# Patient Record
Sex: FEMALE | Race: WHITE | NOT HISPANIC OR LATINO | Employment: OTHER | URBAN - METROPOLITAN AREA
[De-identification: names, ages, dates, MRNs, and addresses within clinical notes are randomized per-mention and may not be internally consistent; named-entity substitution may affect disease eponyms.]

---

## 2017-01-18 ENCOUNTER — OFFICE VISIT (OUTPATIENT)
Dept: FAMILY MEDICINE | Facility: CLINIC | Age: 57
End: 2017-01-18
Payer: COMMERCIAL

## 2017-01-18 VITALS
TEMPERATURE: 97.8 F | SYSTOLIC BLOOD PRESSURE: 136 MMHG | WEIGHT: 188 LBS | HEIGHT: 66 IN | BODY MASS INDEX: 30.22 KG/M2 | DIASTOLIC BLOOD PRESSURE: 80 MMHG | HEART RATE: 62 BPM

## 2017-01-18 DIAGNOSIS — Z23 NEED FOR PROPHYLACTIC VACCINATION AND INOCULATION AGAINST INFLUENZA: ICD-10-CM

## 2017-01-18 DIAGNOSIS — E03.9 ACQUIRED HYPOTHYROIDISM: ICD-10-CM

## 2017-01-18 DIAGNOSIS — Z00.00 ENCOUNTER FOR ROUTINE ADULT HEALTH EXAMINATION WITHOUT ABNORMAL FINDINGS: Primary | ICD-10-CM

## 2017-01-18 DIAGNOSIS — E66.9 NON MORBID OBESITY, UNSPECIFIED OBESITY TYPE: ICD-10-CM

## 2017-01-18 DIAGNOSIS — D36.9 TUBULAR ADENOMA: ICD-10-CM

## 2017-01-18 LAB
ALBUMIN SERPL-MCNC: 4.1 G/DL (ref 3.4–5)
ALP SERPL-CCNC: 108 U/L (ref 40–150)
ALT SERPL W P-5'-P-CCNC: 50 U/L (ref 0–50)
ANION GAP SERPL CALCULATED.3IONS-SCNC: 6 MMOL/L (ref 3–14)
AST SERPL W P-5'-P-CCNC: 25 U/L (ref 0–45)
BILIRUB SERPL-MCNC: 0.6 MG/DL (ref 0.2–1.3)
BUN SERPL-MCNC: 17 MG/DL (ref 7–30)
CALCIUM SERPL-MCNC: 9.6 MG/DL (ref 8.5–10.1)
CHLORIDE SERPL-SCNC: 104 MMOL/L (ref 94–109)
CHOLEST SERPL-MCNC: 216 MG/DL
CO2 SERPL-SCNC: 30 MMOL/L (ref 20–32)
CREAT SERPL-MCNC: 0.97 MG/DL (ref 0.52–1.04)
GFR SERPL CREATININE-BSD FRML MDRD: 59 ML/MIN/1.7M2
GLUCOSE SERPL-MCNC: 93 MG/DL (ref 70–99)
HDLC SERPL-MCNC: 55 MG/DL
LDLC SERPL CALC-MCNC: 145 MG/DL
NONHDLC SERPL-MCNC: 161 MG/DL
POTASSIUM SERPL-SCNC: 4.6 MMOL/L (ref 3.4–5.3)
PROT SERPL-MCNC: 8.1 G/DL (ref 6.8–8.8)
SODIUM SERPL-SCNC: 140 MMOL/L (ref 133–144)
TRIGL SERPL-MCNC: 80 MG/DL
TSH SERPL DL<=0.005 MIU/L-ACNC: 1.67 MU/L (ref 0.4–4)

## 2017-01-18 PROCEDURE — 84443 ASSAY THYROID STIM HORMONE: CPT | Performed by: FAMILY MEDICINE

## 2017-01-18 PROCEDURE — 99396 PREV VISIT EST AGE 40-64: CPT | Performed by: FAMILY MEDICINE

## 2017-01-18 PROCEDURE — 80061 LIPID PANEL: CPT | Performed by: FAMILY MEDICINE

## 2017-01-18 PROCEDURE — 36415 COLL VENOUS BLD VENIPUNCTURE: CPT | Performed by: FAMILY MEDICINE

## 2017-01-18 PROCEDURE — 80053 COMPREHEN METABOLIC PANEL: CPT | Performed by: FAMILY MEDICINE

## 2017-01-18 RX ORDER — LEVOTHYROXINE SODIUM 75 UG/1
75 CAPSULE ORAL DAILY
Qty: 90 CAPSULE | Refills: 3 | Status: CANCELLED | OUTPATIENT
Start: 2017-01-18

## 2017-01-18 NOTE — NURSING NOTE
"Chief Complaint   Patient presents with     Physical     work insurance form       Initial /80 mmHg  Pulse 62  Temp(Src) 97.8  F (36.6  C) (Oral)  Ht 5' 6\" (1.676 m)  Wt 188 lb (85.276 kg)  BMI 30.36 kg/m2  LMP 09/14/2013  Breastfeeding? No Estimated body mass index is 30.36 kg/(m^2) as calculated from the following:    Height as of this encounter: 5' 6\" (1.676 m).    Weight as of this encounter: 188 lb (85.276 kg).  BP completed using cuff size: large    Health maintenance- tsh, souleymane Barnett CMA            "

## 2017-01-18 NOTE — MR AVS SNAPSHOT
After Visit Summary   1/18/2017    Selena Saenz    MRN: [id number]           Patient Information     Date Of Birth          1960        Visit Information        Provider Department      1/18/2017 7:30 AM Rachel Díaz MD [facility]        Today's Diagnoses     Encounter for routine adult health examination without abnormal findings    -  1     Need for prophylactic vaccination and inoculation against influenza         Acquired hypothyroidism         Tubular adenoma         Non morbid obesity, unspecified obesity type            Follow-ups after your visit        Future tests that were ordered for you today     Open Future Orders        Priority Expected Expires Ordered    *MA Screening Digital Bilateral Routine  1/18/2018 1/18/2017            Who to contact     If you have questions or need follow up information about today's clinic visit or your schedule please contact [facility] directly at [phone].  Normal or non-critical lab and imaging results will be communicated to you by MyChart, letter or phone within 4 business days after the clinic has received the results. If you do not hear from us within 7 days, please contact the clinic through [email or web address] or phone. If you have a critical or abnormal lab result, we will notify you by phone as soon as possible.  Submit refill requests through [employer] or call your pharmacy and they will forward the refill request to us. Please allow 3 business days for your refill to be completed.          Additional Information About Your Visit        [email or web address] Information     [employer] gives you secure access to your electronic health record. If you see a primary care provider, you can also send messages to your care team and make appointments. If you have questions, please call your primary care clinic.  If you do not have a primary care provider, please call [phone] and they will assist you.        Care EveryWhere ID   "   This is your Care EveryWhere ID. This could be used by other organizations to access your [facility] medical records  [id number]        Your Vitals Were     Pulse Temperature Height BMI (Body Mass Index) Last Period Breastfeeding?    62 97.8  F (36.6  C) (Oral) 5' 6\" (1.676 m) 30.36 kg/m2 09/14/2013 No       Blood Pressure from Last 3 Encounters:   01/18/17 136/80   02/08/16 118/78   03/27/15 122/70    Weight from Last 3 Encounters:   01/18/17 188 lb (85.276 kg)   02/08/16 178 lb 9.6 oz (81.012 kg)   03/27/15 164 lb 1.6 oz (74.435 kg)              We Performed the Following     COMPREHENSIVE METABOLIC PANEL     Lipid Profile with reflex to direct LDL     TSH with free T4 reflex        Primary Care Provider Office Phone # Fax #    Ramona Ann Aaseby-Aguilera, PA-C [phone]       [facility] [address] [address] [facility] [address]        Thank you!     Thank you for choosing [facility]  for your care. Our goal is always to provide you with excellent care. Hearing back from our patients is one way we can continue to improve our services. Please take a few minutes to complete the written survey that you may receive in the mail after your visit with us. Thank you!             Your Updated Medication List - Protect others around you: Learn how to safely use, store and throw away your medicines at www.disposemymeds.org.          This list is accurate as of: 1/18/17  8:04 AM.  Always use your most recent med list.                   Brand Name Dispense Instructions for use    calcium 500 + D 500-400 MG-UNIT Tabs   Generic drug:  Calcium Carb-Cholecalciferol      Take  by mouth daily.       DAILY MULTIVITAMIN PO      Take  by mouth daily.       Levothyroxine Sodium 75 MCG Caps     90 capsule    Take 75 mcg by mouth daily         "

## 2017-01-19 ENCOUNTER — TELEPHONE (OUTPATIENT)
Dept: FAMILY MEDICINE | Facility: CLINIC | Age: 57
End: 2017-01-19

## 2017-01-19 NOTE — TELEPHONE ENCOUNTER
Form.... [facility] physician screening  faxed, copied and sent original to patient.  Viky Johnson CMA

## 2017-01-20 PROBLEM — N18.30 CKD (CHRONIC KIDNEY DISEASE) STAGE 3, GFR 30-59 ML/MIN (H): Status: ACTIVE | Noted: 2017-01-20

## 2017-04-03 DIAGNOSIS — E03.9 HYPOTHYROIDISM: ICD-10-CM

## 2017-04-03 RX ORDER — LEVOTHYROXINE SODIUM 75 UG/1
TABLET ORAL
Qty: 90 TABLET | Refills: 2 | Status: SHIPPED | OUTPATIENT
Start: 2017-04-03 | End: 2017-12-13

## 2017-04-03 NOTE — TELEPHONE ENCOUNTER
Pending Prescriptions:                       Disp   Refills    levothyroxine (SYNTHROID/LEVOTHROID) 75 M*90 tab*3            Sig: TAKE 75 MCG BY MOUTH DAILY         Last Written Prescription Date: 04/05/2016  Last Quantity: 90, # refills: 3  Last Office Visit with [facility], [facility] or [facility] prescribing provider: 01/18/2017        TSH   Date Value Ref Range Status   01/18/2017 1.67 0.40 - 4.00 mU/L Final     Jefferson BOYERT

## 2017-04-03 NOTE — TELEPHONE ENCOUNTER
Prescription approved per [facility] [place] [place] Refill Protocol.  Sixto Shields RN, BSN

## 2017-07-22 ENCOUNTER — HEALTH MAINTENANCE LETTER (OUTPATIENT)
Age: 57
End: 2017-07-22

## 2017-12-13 DIAGNOSIS — E03.9 HYPOTHYROIDISM: ICD-10-CM

## 2017-12-13 RX ORDER — LEVOTHYROXINE SODIUM 75 UG/1
TABLET ORAL
Qty: 90 TABLET | Refills: 0 | Status: SHIPPED | OUTPATIENT
Start: 2017-12-13 | End: 2018-03-20

## 2017-12-13 NOTE — TELEPHONE ENCOUNTER
Prescription approved per [facility] Refill Protocol.  LM pt to schedule yearly for Eddie Levy RN

## 2018-02-07 ENCOUNTER — TRANSFERRED RECORDS (OUTPATIENT)
Dept: HEALTH INFORMATION MANAGEMENT | Facility: CLINIC | Age: 58
End: 2018-02-07

## 2018-03-20 ENCOUNTER — OFFICE VISIT (OUTPATIENT)
Dept: FAMILY MEDICINE | Facility: CLINIC | Age: 58
End: 2018-03-20
Payer: COMMERCIAL

## 2018-03-20 VITALS
BODY MASS INDEX: 29.94 KG/M2 | WEIGHT: 186.3 LBS | HEIGHT: 66 IN | SYSTOLIC BLOOD PRESSURE: 138 MMHG | TEMPERATURE: 97.9 F | OXYGEN SATURATION: 97 % | DIASTOLIC BLOOD PRESSURE: 83 MMHG | HEART RATE: 68 BPM

## 2018-03-20 DIAGNOSIS — E03.9 ACQUIRED HYPOTHYROIDISM: ICD-10-CM

## 2018-03-20 DIAGNOSIS — N18.30 CKD (CHRONIC KIDNEY DISEASE) STAGE 3, GFR 30-59 ML/MIN (H): ICD-10-CM

## 2018-03-20 DIAGNOSIS — E03.9 HYPOTHYROIDISM, UNSPECIFIED TYPE: ICD-10-CM

## 2018-03-20 DIAGNOSIS — Z00.00 ROUTINE GENERAL MEDICAL EXAMINATION AT A HEALTH CARE FACILITY: Primary | ICD-10-CM

## 2018-03-20 DIAGNOSIS — E78.5 HYPERLIPIDEMIA LDL GOAL <130: ICD-10-CM

## 2018-03-20 DIAGNOSIS — N95.1 SYMPTOMATIC MENOPAUSAL OR FEMALE CLIMACTERIC STATES: ICD-10-CM

## 2018-03-20 LAB
ALBUMIN SERPL-MCNC: 3.7 G/DL (ref 3.4–5)
ALP SERPL-CCNC: 90 U/L (ref 40–150)
ALT SERPL W P-5'-P-CCNC: 26 U/L (ref 0–50)
ANION GAP SERPL CALCULATED.3IONS-SCNC: 6 MMOL/L (ref 3–14)
AST SERPL W P-5'-P-CCNC: 21 U/L (ref 0–45)
BILIRUB SERPL-MCNC: 0.5 MG/DL (ref 0.2–1.3)
BUN SERPL-MCNC: 14 MG/DL (ref 7–30)
CALCIUM SERPL-MCNC: 8.7 MG/DL (ref 8.5–10.1)
CHLORIDE SERPL-SCNC: 108 MMOL/L (ref 94–109)
CHOLEST SERPL-MCNC: 230 MG/DL
CO2 SERPL-SCNC: 26 MMOL/L (ref 20–32)
CREAT SERPL-MCNC: 0.86 MG/DL (ref 0.52–1.04)
ERYTHROCYTE [DISTWIDTH] IN BLOOD BY AUTOMATED COUNT: 13.7 % (ref 10–15)
GFR SERPL CREATININE-BSD FRML MDRD: 67 ML/MIN/1.7M2
GLUCOSE SERPL-MCNC: 92 MG/DL (ref 70–99)
HCT VFR BLD AUTO: 41.5 % (ref 35–47)
HDLC SERPL-MCNC: 53 MG/DL
HGB BLD-MCNC: 13.5 G/DL (ref 11.7–15.7)
LDLC SERPL CALC-MCNC: 157 MG/DL
MCH RBC QN AUTO: 28 PG (ref 26.5–33)
MCHC RBC AUTO-ENTMCNC: 32.5 G/DL (ref 31.5–36.5)
MCV RBC AUTO: 86 FL (ref 78–100)
NONHDLC SERPL-MCNC: 177 MG/DL
PLATELET # BLD AUTO: 247 10E9/L (ref 150–450)
POTASSIUM SERPL-SCNC: 4.1 MMOL/L (ref 3.4–5.3)
PROT SERPL-MCNC: 7.5 G/DL (ref 6.8–8.8)
RBC # BLD AUTO: 4.82 10E12/L (ref 3.8–5.2)
SODIUM SERPL-SCNC: 140 MMOL/L (ref 133–144)
TRIGL SERPL-MCNC: 99 MG/DL
TSH SERPL DL<=0.005 MIU/L-ACNC: 1.65 MU/L (ref 0.4–4)
WBC # BLD AUTO: 6 10E9/L (ref 4–11)

## 2018-03-20 PROCEDURE — 80061 LIPID PANEL: CPT | Performed by: PHYSICIAN ASSISTANT

## 2018-03-20 PROCEDURE — 84443 ASSAY THYROID STIM HORMONE: CPT | Performed by: PHYSICIAN ASSISTANT

## 2018-03-20 PROCEDURE — 36415 COLL VENOUS BLD VENIPUNCTURE: CPT | Performed by: PHYSICIAN ASSISTANT

## 2018-03-20 PROCEDURE — 99396 PREV VISIT EST AGE 40-64: CPT | Performed by: PHYSICIAN ASSISTANT

## 2018-03-20 PROCEDURE — 80053 COMPREHEN METABOLIC PANEL: CPT | Performed by: PHYSICIAN ASSISTANT

## 2018-03-20 PROCEDURE — 85027 COMPLETE CBC AUTOMATED: CPT | Performed by: PHYSICIAN ASSISTANT

## 2018-03-20 RX ORDER — ATORVASTATIN CALCIUM 10 MG/1
5 TABLET, FILM COATED ORAL DAILY
Qty: 45 TABLET | Refills: 1 | Status: SHIPPED | OUTPATIENT
Start: 2018-03-20 | End: 2018-09-12

## 2018-03-20 RX ORDER — LEVOTHYROXINE SODIUM 75 UG/1
TABLET ORAL
Qty: 90 TABLET | Refills: 3 | Status: SHIPPED | OUTPATIENT
Start: 2018-03-20 | End: 2019-03-14

## 2018-03-20 NOTE — NURSING NOTE
"Chief Complaint   Patient presents with     Physical     fasting       Initial /83 (BP Location: Right arm, Patient Position: Chair, Cuff Size: Adult Large)  Pulse 68  Temp 97.9  F (36.6  C) (Oral)  Ht 5' 6\" (1.676 m)  Wt 186 lb 4.8 oz (84.5 kg)  LMP 09/14/2013  SpO2 97%  BMI 30.07 kg/m2 Estimated body mass index is 30.07 kg/(m^2) as calculated from the following:    Height as of this encounter: 5' 6\" (1.676 m).    Weight as of this encounter: 186 lb 4.8 oz (84.5 kg).  Medication Reconciliation: complete      Health Maintenance addressed:  Mammogram    n/a      "

## 2018-03-20 NOTE — PATIENT INSTRUCTIONS
(Z00.00) Routine general medical examination at a health care facility  (primary encounter diagnosis)  Comment:   Plan:     (E03.9) Acquired hypothyroidism  Comment:   Plan: TSH with free T4 reflex            (N18.3) CKD (chronic kidney disease) stage 3, GFR 30-59 ml/min  Comment: has only had 1 lab come back <60.  If this comes back indicating CKD#3 well recommend adding lisinopril   Plan: CBC with platelets, TSH with free T4 reflex,         Comprehensive metabolic panel, Lipid panel         reflex to direct LDL Fasting            (E78.5) Hyperlipidemia LDL goal <130  Comment: well start on lipitor 5 mg.  If CKD#3 and LDL higher than 100, well increase lipitor to 10 mg.    Plan: Comprehensive metabolic panel, Lipid panel         reflex to direct LDL Fasting, atorvastatin         (LIPITOR) 10 MG tablet            (E03.9) Hypothyroidism, unspecified type  Comment:   Plan: levothyroxine (SYNTHROID/LEVOTHROID) 75 MCG         tablet                      Preventive Health Recommendations  Female Ages 50 - 64    Yearly exam: See your health care provider every year in order to  o Review health changes.   o Discuss preventive care.    o Review your medicines if your doctor has prescribed any.      Get a Pap test every three years (unless you have an abnormal result and your provider advises testing more often).    If you get Pap tests with HPV test, you only need to test every 5 years, unless you have an abnormal result.     You do not need a Pap test if your uterus was removed (hysterectomy) and you have not had cancer.    You should be tested each year for STDs (sexually transmitted diseases) if you're at risk.     Have a mammogram every 1 to 2 years.    Have a colonoscopy at age 50, or have a yearly FIT test (stool test). These exams screen for colon cancer.      Have a cholesterol test every 5 years, or more often if advised.    Have a diabetes test (fasting glucose) every three years. If you are at risk for  diabetes, you should have this test more often.     If you are at risk for osteoporosis (brittle bone disease), think about having a bone density scan (DEXA).    Shots: Get a flu shot each year. Get a tetanus shot every 10 years.    Nutrition:     Eat at least 5 servings of fruits and vegetables each day.    Eat whole-grain bread, whole-wheat pasta and brown rice instead of white grains and rice.    Talk to your provider about Calcium and Vitamin D.     Lifestyle    Exercise at least 150 minutes a week (30 minutes a day, 5 days a week). This will help you control your weight and prevent disease.    Limit alcohol to one drink per day.    No smoking.     Wear sunscreen to prevent skin cancer.     See your dentist every six months for an exam and cleaning.    See your eye doctor every 1 to 2 years.    Chronic Kidney Disease (CKD)     The role of the kidneys is to remove waste products and extra water from the blood.  When the kidneys do not work as they should, waste products begin to build up in the blood. This is called chronic kidney disease (CKD). CKD means that you have kidney damage or a decrease in kidney function lasting at least 3 months. CKD allows extra water, waste, and toxins to build up in the body. This can eventually become life-threatening. You might need dialysis or a kidney transplant to stay alive. This most severe form is called end stage renal disease.  Diabetes is the leading causes of chronic renal failure. Other causes include high blood pressure, hardening of the arteries (atherosclerosis), lupus, inflammation of the blood vessels (vasculitis), and past viral or bacterial infections. Certain over-the-counter pain medicines can cause renal failure when taken often over a long period of time. These include aspirin, ibuprofen, and related anti-inflammatory medicines called NSAIDs (nonsteroidal anti-inflammatory drugs).  Home care  The following guidelines will help you care for yourself at  home:    If you have diabetes, talk with your healthcare provider about keeping your blood sugar under control. Ask if you need to make and changes to your diet, lifestyle, or medicines.    If you have high blood pressure:    Take prescribed medicine to lower your blood pressure to the recommended goal of less than 130/80.    Start a regular exercise program that you enjoy. Check with your healthcare provider to be sure your planned exercise program is right for you.    Eat less salt (sodium). Your healthcare provider can tell you how much salt per day is safe for you.    If you are overweight, talk with your healthcare provider about a weight loss plan.    If you smoke, you must quit. Smoking makes kidney disease worse. Talk with your healthcare provider about ways to help you quit.  For more information, visit the following links:    www.smokefree.gov/sites/default/files/pdf/clearing-the-air-accessible.pdf    www.smokefree.gov    www.cancer.org/healthy/stayawayfromtobacco/guidetoquittingsmoking/    Most people with CKD need to follow a special diet.  Be sure you understand yours. In general, you will need to limit protein, salt, potassium, and phosphorus. You also need to limit how much fluid you drink.     CKD is a risk factor for heart disease. Talk with your healthcare provider about any other risk factors you might have and what you can do to lessen them.    Talk with your healthcare provider about any medicines you are taking to find out if they need to be reduced or stopped.    Don't use the following over-the-counter medicines, or consult your healthcare provider before using:    Aspirin and NSAIDs such as ibuprofen or naproxen. Using acetaminophen for fever or pain is OK.    Laxatives and antacids containing magnesium or aluminum    Fleet or phospho soda enemas containing phosphorus    Certain stomach acid-blocking medicine such as cimetidine or ranitidine     Decongestants containing  pseudoephedrine     Herbal supplements  Follow-up care  Follow up with your healthcare provider, or as advised. Contact one of the following for more information:    American Association of Kidney Patients 949-534-4505 www.aakp.org    National Kidney Foundation 471-995-1934 www.kidney.org    American Kidney Fund 168-106-4304 www.kidneyfund.org    National Kidney Disease Education Program 866-4KIDNEY www.nkdep.nih.gov  If an X-ray, ECG (cardiogram), or other diagnostic test was taken, you will be told of any new findings that may affect your care.  Call 911  Call 911 if you have any of the following:    Severe weakness, dizziness, fainting, drowsiness, or confusion    Chest pain or shortness of breath    Heart beating fast, slow, or irregularly  When to seek medical advice  Call your healthcare provider right away if any of these occur:    Nausea or vomiting    Fever of 100.4 F (38 C) or higher, or as directed by your healthcare provider    Unexpected weight gain or swelling in the legs, ankles, or around the eyes    Decrease or absent urine output  Date Last Reviewed: 9/1/2016 2000-2017 [employer]. [address] [place] [address]. All rights reserved. This information is not intended as a substitute for professional medical care. Always follow your healthcare professional's instructions.        Medicine for Cholesterol Control  Cholesterol is a waxy substance in your bloodstream. If there is too much of it in your blood, it can build up in the walls of your arteries. Over time, this buildup can lead to coronary disease. Coronary disease can put you at risk for a heart attack or stroke. It can also put you at risk for disease of the arteries in your legs and other places in your body. Medicine can give you the extra help you need to control your cholesterol.    How medicine helps  Different kinds of medicines help with cholesterol levels. Some help lower your LDL (bad cholesterol). Some  help raise your HDL (good cholesterol). Other medicines lower your triglyceride levels. And some do all three. It may take some time to find the right medicine for you. Taking medicine will be only one part of your cholesterol control plan. You will still need to eat right and get regular exercise.  Talk with your healthcare provider to find out your risks for having a heart attack. Your provider can tell you what goals to use to see if your treatment is working. These goals may vary based on your health issues or family history. Also ask your provider how often your cholesterol should be checked as part of your treatment plan. You may need to fast before getting your cholesterol checked.  Taking your medicine  It is important to:    Tell your healthcare provider about any other medicines you take. This includes over-the-counter medicines. It also includes vitamins and herbs.    Take your medicine exactly as directed. This helps make sure that it works as it should.    Don't skip a dose.    Don't stop taking it if you feel better.    Don't stop taking it when your cholesterol numbers improve.    Order your refill before your medicine runs out.  Side effects  Medicines can cause side effects. These often occur at the start of taking a new medicine. Side effects can include headache and upset stomach. Rarely you can have muscle aches. Tell your healthcare provider about any side effects you have.  When to call your healthcare provider  When taking your medicine, let your healthcare provider know if you have:    Yellowing of the whites of eyes    Blurred vision    Muscle aches    Trouble breathing   High-risk groups  Some people may need to take medicines called statins to control their cholesterol. This is in addition to eating a healthy diet and getting regular exercise.  Statins can help you stay healthy. They can also help prevent a heart attack or stroke. You may need to take a statin if you are in one of these  groups:    Adults who have had a heart attack or stroke. Or adults who have had peripheral vascular disease, a ministroke (transient ischemic attack), or stable or unstable angina. This group also includes people who have had a procedure to restore blood flow through a blocked artery. These procedures include percutaneous coronary intervention, angioplasty, stent, and open-heart bypass surgery.    Adults who have diabetes. Or adults who are at higher risk of having a heart attack or stroke and have an LDL cholesterol level of 70 to 189 mg/dL    Adults who are 21 years old or older and have an LDL cholesterol level of 190 mg/dL or higher.  If you are in a high-risk group, talk with your healthcare provider about your treatment goals. Make sure you understand why these goals are important, based on your own health history and your family history of heart disease or high cholesterol.  Make a plan to have regular cholesterol checks. You may need to fast before getting this test. Also ask your provider about any side effects your medicines may cause. Let your provider know about any side effects you have. You may need to take more than one medicine to reach the cholesterol goals that you and your provider decide on.  Date Last Reviewed: 10/1/2016    8166-9789 [employer]. [address] [place] [address], [place] [address]. All rights reserved. This information is not intended as a substitute for professional medical care. Always follow your healthcare professional's instructions.

## 2018-03-20 NOTE — MR AVS SNAPSHOT
After Visit Summary   3/20/2018    Selena Saenz    MRN: [id number]           Patient Information     Date Of Birth          1960        Visit Information        Provider Department      3/20/2018 7:00 AM Aaseby-Aguilera, Ramona Ann, PA-C [facility]        Today's Diagnoses     Routine general medical examination at a health care facility    -  1    Acquired hypothyroidism        CKD (chronic kidney disease) stage 3, GFR 30-59 ml/min        Hyperlipidemia LDL goal <130        Hypothyroidism, unspecified type        Symptomatic menopausal or female climacteric states          Care Instructions      (Z00.00) Routine general medical examination at a health care facility  (primary encounter diagnosis)  Comment:   Plan:     (E03.9) Acquired hypothyroidism  Comment:   Plan: TSH with free T4 reflex            (N18.3) CKD (chronic kidney disease) stage 3, GFR 30-59 ml/min  Comment: has only had 1 lab come back <60.  If this comes back indicating CKD#3 well recommend adding lisinopril   Plan: CBC with platelets, TSH with free T4 reflex,         Comprehensive metabolic panel, Lipid panel         reflex to direct LDL Fasting            (E78.5) Hyperlipidemia LDL goal <130  Comment: well start on lipitor 5 mg.  If CKD#3 and LDL higher than 100, well increase lipitor to 10 mg.    Plan: Comprehensive metabolic panel, Lipid panel         reflex to direct LDL Fasting, atorvastatin         (LIPITOR) 10 MG tablet            (E03.9) Hypothyroidism, unspecified type  Comment:   Plan: levothyroxine (SYNTHROID/LEVOTHROID) 75 MCG         tablet                      Preventive Health Recommendations  Female Ages 50 - 64    Yearly exam: See your health care provider every year in order to  o Review health changes.   o Discuss preventive care.    o Review your medicines if your doctor has prescribed any.      Get a Pap test every three years (unless you have an abnormal result and your provider advises  testing more often).    If you get Pap tests with HPV test, you only need to test every 5 years, unless you have an abnormal result.     You do not need a Pap test if your uterus was removed (hysterectomy) and you have not had cancer.    You should be tested each year for STDs (sexually transmitted diseases) if you're at risk.     Have a mammogram every 1 to 2 years.    Have a colonoscopy at age 50, or have a yearly FIT test (stool test). These exams screen for colon cancer.      Have a cholesterol test every 5 years, or more often if advised.    Have a diabetes test (fasting glucose) every three years. If you are at risk for diabetes, you should have this test more often.     If you are at risk for osteoporosis (brittle bone disease), think about having a bone density scan (DEXA).    Shots: Get a flu shot each year. Get a tetanus shot every 10 years.    Nutrition:     Eat at least 5 servings of fruits and vegetables each day.    Eat whole-grain bread, whole-wheat pasta and brown rice instead of white grains and rice.    Talk to your provider about Calcium and Vitamin D.     Lifestyle    Exercise at least 150 minutes a week (30 minutes a day, 5 days a week). This will help you control your weight and prevent disease.    Limit alcohol to one drink per day.    No smoking.     Wear sunscreen to prevent skin cancer.     See your dentist every six months for an exam and cleaning.    See your eye doctor every 1 to 2 years.    Chronic Kidney Disease (CKD)     The role of the kidneys is to remove waste products and extra water from the blood.  When the kidneys do not work as they should, waste products begin to build up in the blood. This is called chronic kidney disease (CKD). CKD means that you have kidney damage or a decrease in kidney function lasting at least 3 months. CKD allows extra water, waste, and toxins to build up in the body. This can eventually become life-threatening. You might need dialysis or a kidney  transplant to stay alive. This most severe form is called end stage renal disease.  Diabetes is the leading causes of chronic renal failure. Other causes include high blood pressure, hardening of the arteries (atherosclerosis), lupus, inflammation of the blood vessels (vasculitis), and past viral or bacterial infections. Certain over-the-counter pain medicines can cause renal failure when taken often over a long period of time. These include aspirin, ibuprofen, and related anti-inflammatory medicines called NSAIDs (nonsteroidal anti-inflammatory drugs).  Home care  The following guidelines will help you care for yourself at home:    If you have diabetes, talk with your healthcare provider about keeping your blood sugar under control. Ask if you need to make and changes to your diet, lifestyle, or medicines.    If you have high blood pressure:    Take prescribed medicine to lower your blood pressure to the recommended goal of less than 130/80.    Start a regular exercise program that you enjoy. Check with your healthcare provider to be sure your planned exercise program is right for you.    Eat less salt (sodium). Your healthcare provider can tell you how much salt per day is safe for you.    If you are overweight, talk with your healthcare provider about a weight loss plan.    If you smoke, you must quit. Smoking makes kidney disease worse. Talk with your healthcare provider about ways to help you quit.  For more information, visit the following links:    www.smokefree.gov/sites/default/files/pdf/clearing-the-air-accessible.pdf    www.smokefree.gov    www.cancer.org/healthy/stayawayfromtobacco/guidetoquittingsmoking/    Most people with CKD need to follow a special diet.  Be sure you understand yours. In general, you will need to limit protein, salt, potassium, and phosphorus. You also need to limit how much fluid you drink.     CKD is a risk factor for heart disease. Talk with your healthcare provider about any  other risk factors you might have and what you can do to lessen them.    Talk with your healthcare provider about any medicines you are taking to find out if they need to be reduced or stopped.    Don't use the following over-the-counter medicines, or consult your healthcare provider before using:    Aspirin and NSAIDs such as ibuprofen or naproxen. Using acetaminophen for fever or pain is OK.    Laxatives and antacids containing magnesium or aluminum    Fleet or phospho soda enemas containing phosphorus    Certain stomach acid-blocking medicine such as cimetidine or ranitidine     Decongestants containing pseudoephedrine     Herbal supplements  Follow-up care  Follow up with your healthcare provider, or as advised. Contact one of the following for more information:    American Association of Kidney Patients 584-465-0709 www.aakp.org    National Kidney Foundation 397-033-6142 www.kidney.org    American Kidney Fund 660-506-5403 www.kidneyfund.org    National Kidney Disease Education Program 866-4KIDNEY www.nkdep.nih.gov  If an X-ray, ECG (cardiogram), or other diagnostic test was taken, you will be told of any new findings that may affect your care.  Call 911  Call 911 if you have any of the following:    Severe weakness, dizziness, fainting, drowsiness, or confusion    Chest pain or shortness of breath    Heart beating fast, slow, or irregularly  When to seek medical advice  Call your healthcare provider right away if any of these occur:    Nausea or vomiting    Fever of 100.4 F (38 C) or higher, or as directed by your healthcare provider    Unexpected weight gain or swelling in the legs, ankles, or around the eyes    Decrease or absent urine output  Date Last Reviewed: 9/1/2016 2000-2017 [employer]. [address] [place] [address], [place] [address]. All rights reserved. This information is not intended as a substitute for professional medical care. Always follow your healthcare professional's  instructions.        Medicine for Cholesterol Control  Cholesterol is a waxy substance in your bloodstream. If there is too much of it in your blood, it can build up in the walls of your arteries. Over time, this buildup can lead to coronary disease. Coronary disease can put you at risk for a heart attack or stroke. It can also put you at risk for disease of the arteries in your legs and other places in your body. Medicine can give you the extra help you need to control your cholesterol.    How medicine helps  Different kinds of medicines help with cholesterol levels. Some help lower your LDL (bad cholesterol). Some help raise your HDL (good cholesterol). Other medicines lower your triglyceride levels. And some do all three. It may take some time to find the right medicine for you. Taking medicine will be only one part of your cholesterol control plan. You will still need to eat right and get regular exercise.  Talk with your healthcare provider to find out your risks for having a heart attack. Your provider can tell you what goals to use to see if your treatment is working. These goals may vary based on your health issues or family history. Also ask your provider how often your cholesterol should be checked as part of your treatment plan. You may need to fast before getting your cholesterol checked.  Taking your medicine  It is important to:    Tell your healthcare provider about any other medicines you take. This includes over-the-counter medicines. It also includes vitamins and herbs.    Take your medicine exactly as directed. This helps make sure that it works as it should.    Don't skip a dose.    Don't stop taking it if you feel better.    Don't stop taking it when your cholesterol numbers improve.    Order your refill before your medicine runs out.  Side effects  Medicines can cause side effects. These often occur at the start of taking a new medicine. Side effects can include headache and upset stomach.  Rarely you can have muscle aches. Tell your healthcare provider about any side effects you have.  When to call your healthcare provider  When taking your medicine, let your healthcare provider know if you have:    Yellowing of the whites of eyes    Blurred vision    Muscle aches    Trouble breathing   High-risk groups  Some people may need to take medicines called statins to control their cholesterol. This is in addition to eating a healthy diet and getting regular exercise.  Statins can help you stay healthy. They can also help prevent a heart attack or stroke. You may need to take a statin if you are in one of these groups:    Adults who have had a heart attack or stroke. Or adults who have had peripheral vascular disease, a ministroke (transient ischemic attack), or stable or unstable angina. This group also includes people who have had a procedure to restore blood flow through a blocked artery. These procedures include percutaneous coronary intervention, angioplasty, stent, and open-heart bypass surgery.    Adults who have diabetes. Or adults who are at higher risk of having a heart attack or stroke and have an LDL cholesterol level of 70 to 189 mg/dL    Adults who are 21 years old or older and have an LDL cholesterol level of 190 mg/dL or higher.  If you are in a high-risk group, talk with your healthcare provider about your treatment goals. Make sure you understand why these goals are important, based on your own health history and your family history of heart disease or high cholesterol.  Make a plan to have regular cholesterol checks. You may need to fast before getting this test. Also ask your provider about any side effects your medicines may cause. Let your provider know about any side effects you have. You may need to take more than one medicine to reach the cholesterol goals that you and your provider decide on.  Date Last Reviewed: 10/1/2016    [id number] [employer]. [address] [place] [address]  "Road, [place] [address]. All rights reserved. This information is not intended as a substitute for professional medical care. Always follow your healthcare professional's instructions.                Follow-ups after your visit        Your next 10 appointments already scheduled     Apr 09, 2018  7:30 AM CDT   (Arrive by 7:15 AM)   MA SCREENING DIGITAL BILATERAL with LVMA1   [facility] ([facility])    [address] [facility] [address] [phone]           Do not use any powder, lotion or deodorant under your arms or on your breast. If you do, we will ask you to remove it before your exam.  Wear comfortable, two-piece clothing.  If you have any allergies, tell your care team.  Bring any previous mammograms from other facilities or have them mailed to the breast center. Three-dimensional (3D) mammograms are available at [place] locations in [facility], [facility][1], [facility], and [place]. [facility] locations include [place] and [facility] in [place]. Benefits of 3D mammograms include: - Improved rate of cancer detection - Decreases your chance of having to go back for more tests, which means fewer: - \"False-positive\" results (This means that there is an abnormal area but it isn't cancer.) - Invasive testing procedures, such as a biopsy or surgery - Can provide clearer images of the breast if you have dense breast tissue. 3D mammography is an optional exam that anyone can have with a 2D mammogram. It doesn't replace or take the place of a 2D mammogram. 2D mammograms remain an effective screening test for all women.  Not all insurance companies cover the cost of a 3D mammogram. Check with your insurance.              Future tests that were ordered for you today     Open Future Orders        Priority Expected Expires Ordered    DX Hip/Pelvis/Spine Routine  3/20/2019 3/20/2018            Who to contact     If you have " "questions or need follow up information about today's clinic visit or your schedule please contact [facility] directly at [phone].  Normal or non-critical lab and imaging results will be communicated to you by MyChart, letter or phone within 4 business days after the clinic has received the results. If you do not hear from us within 7 days, please contact the clinic through [facility] or phone. If you have a critical or abnormal lab result, we will notify you by phone as soon as possible.  Submit refill requests through [facility] or call your pharmacy and they will forward the refill request to us. Please allow 3 business days for your refill to be completed.          Additional Information About Your Visit        [facility] Information     [facility] gives you secure access to your electronic health record. If you see a primary care provider, you can also send messages to your care team and make appointments. If you have questions, please call your primary care clinic.  If you do not have a primary care provider, please call [phone] and they will assist you.        Care EveryWhere ID     This is your Care EveryWhere ID. This could be used by other organizations to access your [place] medical records  [id number]        Your Vitals Were     Pulse Temperature Height Last Period Pulse Oximetry BMI (Body Mass Index)    68 97.9  F (36.6  C) (Oral) 5' 6\" (1.676 m) 09/14/2013 97% 30.07 kg/m2       Blood Pressure from Last 3 Encounters:   03/20/18 138/83   01/18/17 136/80   02/08/16 118/78    Weight from Last 3 Encounters:   03/20/18 186 lb 4.8 oz (84.5 kg)   01/18/17 188 lb (85.3 kg)   02/08/16 178 lb 9.6 oz (81 kg)              We Performed the Following     CBC with platelets     Comprehensive metabolic panel     Lipid panel reflex to direct LDL Fasting     TSH with free T4 reflex          Today's Medication Changes          These changes are accurate as of 3/20/18  7:39 AM.  If you have any questions, " ask your nurse or doctor.               Start taking these medicines.        Dose/Directions    atorvastatin 10 MG tablet   Commonly known as:  LIPITOR   Used for:  Hyperlipidemia LDL goal <130   Started by:  Aaseby-Aguilera, Ramona Ann, PA-C        Dose:  5 mg   Take 0.5 tablets (5 mg) by mouth daily   Quantity:  45 tablet   Refills:  1         These medicines have changed or have updated prescriptions.        Dose/Directions    levothyroxine 75 MCG tablet   Commonly known as:  SYNTHROID/LEVOTHROID   This may have changed:  See the new instructions.   Used for:  Hypothyroidism, unspecified type   Changed by:  Aaseby-Aguilera, Ramona Ann, PA-C        TAKE 75 MCG BY MOUTH DAILY   Quantity:  90 tablet   Refills:  3            Where to get your medicines      These medications were sent to [facility]/pharmacy #[id number] - [place], [place] - [address] [facility].  [address] [facility] [facility] [address].[facility] [address]     Phone:  [phone]     atorvastatin 10 MG tablet    levothyroxine 75 MCG tablet                Primary Care Provider Office Phone # Fax #    Ramona Ann Aaseby-Aguilera, PA-C [phone] [id number]  [facility] [address]        Equal Access to Services     SEAN FARRIS : Hadii aad ku hadasho Soomaali, waaxda luqadaha, qaybta kaalmada adeegyada, waxay idiin hayamadon adekiki kharajarrell smith. So [facility] [phone].    ATENCIÓN: Si habla español, tiene a rojo disposición servicios gratuitos de asistencia lingüística. [place] al [phone].    We comply with applicable federal civil rights laws and [place] laws. We do not discriminate on the basis of race, color, national origin, age, disability, sex, sexual orientation, or gender identity.            Thank you!     Thank you for choosing [facility]  for your care. Our goal is always to provide you with excellent care. Hearing back from our patients is one way we can continue to improve our services. Please take a few minutes to complete the written survey  that you may receive in the mail after your visit with us. Thank you!             Your Updated Medication List - Protect others around you: Learn how to safely use, store and throw away your medicines at www.disposemymeds.org.          This list is accurate as of 3/20/18  7:39 AM.  Always use your most recent med list.                   Brand Name Dispense Instructions for use Diagnosis    atorvastatin 10 MG tablet    LIPITOR    45 tablet    Take 0.5 tablets (5 mg) by mouth daily    Hyperlipidemia LDL goal <130       calcium 500 + D 500-400 MG-UNIT Tabs   Generic drug:  Calcium Carb-Cholecalciferol      Take  by mouth daily.        DAILY MULTIVITAMIN PO      Take  by mouth daily.        levothyroxine 75 MCG tablet    SYNTHROID/LEVOTHROID    90 tablet    TAKE 75 MCG BY MOUTH DAILY    Hypothyroidism, unspecified type

## 2018-03-20 NOTE — PROGRESS NOTES
SUBJECTIVE:   CC: Selena Saenz is an 57 year old woman who presents for preventive health visit.     Physical   Annual:     Getting at least 3 servings of Calcium per day::  Yes    Bi-annual eye exam::  Yes    Dental care twice a year::  NO    Sleep apnea or symptoms of sleep apnea::  None    Diet::  Regular (no restrictions)    Frequency of exercise::  1 day/week    Duration of exercise::  Less than 15 minutes    Taking medications regularly::  Yes    Medication side effects::  None    Additional concerns today::  No                    Today's PHQ-2 Score:   PHQ-2 ( 1999 Pfizer) 3/19/2018   Q1: Little interest or pleasure in doing things 0   Q2: Feeling down, depressed or hopeless 0   PHQ-2 Score 0   Q1: Little interest or pleasure in doing things Not at all   Q2: Feeling down, depressed or hopeless Not at all   PHQ-2 Score 0       Abuse: Current or Past(Physical, Sexual or Emotional)- No  Do you feel safe in your environment - Yes    Social History   Substance Use Topics     Smoking status: Never Smoker     Smokeless tobacco: Never Used     Alcohol use Yes      Comment: 2-3 glasses of wine weekly     Alcohol Use 3/19/2018   If you drink alcohol do you typically have greater than 3 drinks per day OR greater than 7 drinks per week? No       Reviewed orders with patient.  Reviewed health maintenance and updated orders accordingly - Yes  BP Readings from Last 3 Encounters:   03/20/18 138/83   01/18/17 136/80   02/08/16 118/78    Wt Readings from Last 3 Encounters:   03/20/18 186 lb 4.8 oz (84.5 kg)   01/18/17 188 lb (85.3 kg)   02/08/16 178 lb 9.6 oz (81 kg)                    Patient over age 50, mutual decision to screen reflected in health maintenance.    Pertinent mammograms are reviewed under the imaging tab.  History of abnormal Pap smear: NO - age 30-65 PAP every 5 years with negative HPV co-testing recommended    Reviewed and updated as needed this visit by clinical staff         Reviewed and updated as  needed this visit by Provider            Review of Systems  C: NEGATIVE for fever, chills, change in weight  I: NEGATIVE for worrisome rashes, moles or lesions  E: NEGATIVE for vision changes or irritation  ENT: NEGATIVE for ear, mouth and throat problems  R: NEGATIVE for significant cough or SOB  B: NEGATIVE for masses, tenderness or discharge  CV: NEGATIVE for chest pain, palpitations or peripheral edema  GI: NEGATIVE for nausea, abdominal pain, heartburn, or change in bowel habits  : NEGATIVE for unusual urinary or vaginal symptoms. No vaginal bleeding.  M: NEGATIVE for significant arthralgias or myalgia  N: NEGATIVE for weakness, dizziness or paresthesias  P: NEGATIVE for changes in mood or affect      OBJECTIVE:   LMP 09/14/2013  Physical Exam  GENERAL APPEARANCE: healthy, alert and no distress  EYES: Eyes grossly normal to inspection, PERRL and conjunctivae and sclerae normal  HENT: ear canals and TM's normal, nose and mouth without ulcers or lesions, oropharynx clear and oral mucous membranes moist  NECK: no adenopathy, no asymmetry, masses, or scars and thyroid normal to palpation  RESP: lungs clear to auscultation - no rales, rhonchi or wheezes  BREAST: normal without masses, tenderness or nipple discharge and no palpable axillary masses or adenopathy  CV: regular rate and rhythm, normal S1 S2, no S3 or S4, no murmur, click or rub, no peripheral edema and peripheral pulses strong  ABDOMEN: soft, nontender, no hepatosplenomegaly, no masses and bowel sounds normal  MS: no musculoskeletal defects are noted and gait is age appropriate without ataxia  SKIN: no suspicious lesions or rashes  NEURO: Normal strength and tone, sensory exam grossly normal, mentation intact and speech normal  PSYCH: mentation appears normal and affect normal/bright    ASSESSMENT/PLAN:   1. Routine general medical examination at a health care facility      2. Acquired hypothyroidism    - TSH with free T4 reflex    3. CKD (chronic  "kidney disease) stage 3, GFR 30-59 ml/min    - CBC with platelets  - TSH with free T4 reflex  - Comprehensive metabolic panel  - Lipid panel reflex to direct LDL Fasting    4. Hyperlipidemia LDL goal <130    - Comprehensive metabolic panel  - Lipid panel reflex to direct LDL Fasting  - atorvastatin (LIPITOR) 10 MG tablet; Take 0.5 tablets (5 mg) by mouth daily  Dispense: 45 tablet; Refill: 1    5. Hypothyroidism, unspecified type    - levothyroxine (SYNTHROID/LEVOTHROID) 75 MCG tablet; TAKE 75 MCG BY MOUTH DAILY  Dispense: 90 tablet; Refill: 3    6. Symptomatic menopausal or female climacteric states    - DX Hip/Pelvis/Spine; Future    COUNSELING:  Reviewed preventive health counseling, as reflected in patient instructions       Regular exercise       Healthy diet/nutrition       Vision screening       Hearing screening       Aspirin Prophylaxsis         reports that she has never smoked. She has never used smokeless tobacco.    Estimated body mass index is 30.34 kg/(m^2) as calculated from the following:    Height as of 1/18/17: 5' 6\" (1.676 m).    Weight as of 1/18/17: 188 lb (85.3 kg).       Counseling Resources:  ATP IV Guidelines  Pooled Cohorts Equation Calculator  Breast Cancer Risk Calculator  FRAX Risk Assessment  ICSI Preventive Guidelines  Dietary Guidelines for Americans, 2010  USDA's MyPlate  ASA Prophylaxis  Lung CA Screening    Ramona Ann Aaseby-Aguilera, PA-C  [facility]  Answers for HPI/ROS submitted by the patient on 3/19/2018   PHQ-2 Score: 0    "

## 2018-04-09 ENCOUNTER — RADIANT APPOINTMENT (OUTPATIENT)
Dept: MAMMOGRAPHY | Facility: CLINIC | Age: 58
End: 2018-04-09
Payer: COMMERCIAL

## 2018-04-09 DIAGNOSIS — Z12.31 VISIT FOR SCREENING MAMMOGRAM: ICD-10-CM

## 2018-04-09 PROCEDURE — 77067 SCR MAMMO BI INCL CAD: CPT | Mod: TC

## 2018-04-11 DIAGNOSIS — R92.8 ABNORMAL MAMMOGRAM: Primary | ICD-10-CM

## 2018-04-26 ENCOUNTER — HOSPITAL ENCOUNTER (OUTPATIENT)
Dept: MAMMOGRAPHY | Facility: CLINIC | Age: 58
Discharge: HOME | End: 2018-04-26
Attending: FAMILY MEDICINE | Admitting: FAMILY MEDICINE
Payer: COMMERCIAL

## 2018-04-26 ENCOUNTER — HOSPITAL ENCOUNTER (OUTPATIENT)
Dept: ULTRASOUND IMAGING | Facility: CLINIC | Age: 58
End: 2018-04-26
Attending: FAMILY MEDICINE
Payer: COMMERCIAL

## 2018-04-26 DIAGNOSIS — R92.8 ABNORMAL MAMMOGRAM: ICD-10-CM

## 2018-04-26 PROCEDURE — 77065 DX MAMMO INCL CAD UNI: CPT

## 2018-04-26 PROCEDURE — 76642 ULTRASOUND BREAST LIMITED: CPT | Mod: RT

## 2018-08-08 ENCOUNTER — TRANSFERRED RECORDS (OUTPATIENT)
Dept: HEALTH INFORMATION MANAGEMENT | Facility: CLINIC | Age: 58
End: 2018-08-08

## 2018-09-12 DIAGNOSIS — E78.5 HYPERLIPIDEMIA LDL GOAL <130: ICD-10-CM

## 2018-09-13 RX ORDER — ATORVASTATIN CALCIUM 10 MG/1
TABLET, FILM COATED ORAL
Qty: 45 TABLET | Refills: 1 | Status: SHIPPED | OUTPATIENT
Start: 2018-09-13 | End: 2019-03-14

## 2018-09-13 NOTE — TELEPHONE ENCOUNTER
"Requested Prescriptions   Pending Prescriptions Disp Refills     atorvastatin (LIPITOR) 10 MG tablet [Pharmacy Med Name: ATORVASTATIN 10 MG TABLET]  Last Written Prescription Date:  03/20/2018  Last Fill Quantity: 45,  # refills: 1   Last office visit: 3/20/2018 with prescribing provider:  03/20/2018   Future Office Visit:     45 tablet 1     Sig: TAKE 0.5 TABLETS (5 MG) BY MOUTH DAILY    Statins Protocol Passed    9/12/2018  6:20 PM       Passed - LDL on file in past 12 months    Recent Labs   Lab Test  03/20/18   0744   LDL  157*            Passed - No abnormal creatine kinase in past 12 months    No lab results found.            Passed - Recent (12 mo) or future (30 days) visit within the authorizing provider's specialty    Patient had office visit in the last 12 months or has a visit in the next 30 days with authorizing provider or within the authorizing provider's specialty.  See \"Patient Info\" tab in inbasket, or \"Choose Columns\" in Meds & Orders section of the refill encounter.           Passed - Patient is age 18 or older       Passed - No active pregnancy on record       Passed - No positive pregnancy test in past 12 months          "

## 2018-09-13 NOTE — TELEPHONE ENCOUNTER
Prescription approved per [facility] Refill Protocol.  Sixto Shields RN, BSN

## 2019-03-12 ENCOUNTER — MYC MEDICAL ADVICE (OUTPATIENT)
Dept: NURSING | Facility: CLINIC | Age: 59
End: 2019-03-12

## 2019-03-12 DIAGNOSIS — E03.9 HYPOTHYROIDISM, UNSPECIFIED TYPE: ICD-10-CM

## 2019-03-12 NOTE — TELEPHONE ENCOUNTER
"Requested Prescriptions   Pending Prescriptions Disp Refills     levothyroxine (SYNTHROID/LEVOTHROID) 75 MCG tablet [Pharmacy Med Name: LEVOTHYROXINE 75 MCG TABLET] 90 tablet 3    Last Written Prescription Date:  03/20/2018  Last Fill Quantity: 90 tablet,  # refills: 3   Last office visit: 3/20/2018 with prescribing provider:  03/20/2018   Future Office Visit:   Next 5 appointments (look out 90 days)    Mar 14, 2019  8:15 AM CDT  PHYSICAL with Ramona Ann Aaseby-Aguilera, PA-C  [facility] [address] [address] [address] [phone]          Sig: TAKE 75 MCG BY MOUTH DAILY    Thyroid Protocol Passed - 3/12/2019  1:49 AM       Passed - Patient is 12 years or older       Passed - Recent (12 mo) or future (30 days) visit within the authorizing provider's specialty    Patient had office visit in the last 12 months or has a visit in the next 30 days with authorizing provider or within the authorizing provider's specialty.  See \"Patient Info\" tab in inbasket, or \"Choose Columns\" in Meds & Orders section of the refill encounter.             Passed - Medication is active on med list       Passed - Normal TSH on file in past 12 months    Recent Labs   Lab Test 03/20/18  0744   TSH 1.65             Passed - No active pregnancy on record    If patient is pregnant or has had a positive pregnancy test, please check TSH.         Passed - No positive pregnancy test in past 12 months    If patient is pregnant or has had a positive pregnancy test, please check TSH.          Jefferson Mata XRT  "

## 2019-03-13 DIAGNOSIS — E78.5 HYPERLIPIDEMIA LDL GOAL <130: ICD-10-CM

## 2019-03-13 RX ORDER — LEVOTHYROXINE SODIUM 75 UG/1
TABLET ORAL
Qty: 90 TABLET | Refills: 3 | OUTPATIENT
Start: 2019-03-13

## 2019-03-13 RX ORDER — ATORVASTATIN CALCIUM 10 MG/1
TABLET, FILM COATED ORAL
Qty: 45 TABLET | Refills: 1 | OUTPATIENT
Start: 2019-03-13

## 2019-03-13 ASSESSMENT — ENCOUNTER SYMPTOMS
EYE PAIN: 0
WEAKNESS: 0
NERVOUS/ANXIOUS: 0
ABDOMINAL PAIN: 0
JOINT SWELLING: 0
SORE THROAT: 0
DYSURIA: 0
DIARRHEA: 0
CONSTIPATION: 0
SHORTNESS OF BREATH: 0
BREAST MASS: 0
CHILLS: 0
NAUSEA: 0
HEADACHES: 0
HEARTBURN: 0
ARTHRALGIAS: 0
PARESTHESIAS: 0
DIZZINESS: 0
COUGH: 0
HEMATOCHEZIA: 0
FEVER: 0
HEMATURIA: 0
MYALGIAS: 0
PALPITATIONS: 0
FREQUENCY: 0

## 2019-03-13 NOTE — TELEPHONE ENCOUNTER
"Requested Prescriptions   Pending Prescriptions Disp Refills     atorvastatin (LIPITOR) 10 MG tablet [Pharmacy Med Name: ATORVASTATIN 10 MG TABLET] 45 tablet 1    Last Written Prescription Date:  09/13/2018  Last Fill Quantity: 45 TABLET,  # refills: 1   Last office visit: 3/20/2018 with prescribing provider:  03/20/2018   Future Office Visit:   Next 5 appointments (look out 90 days)    Mar 14, 2019  8:15 AM CDT  PHYSICAL with Ramona Ann Aaseby-Aguilera, PA-C  [facility] [address] [address] [address] [phone]          Sig: TAKE 0.5 TABLETS (5 MG) BY MOUTH DAILY    Statins Protocol Passed - 3/13/2019  1:43 AM       Passed - LDL on file in past 12 months    Recent Labs   Lab Test 03/20/18  0744   *            Passed - No abnormal creatine kinase in past 12 months    No lab results found.            Passed - Recent (12 mo) or future (30 days) visit within the authorizing provider's specialty    Patient had office visit in the last 12 months or has a visit in the next 30 days with authorizing provider or within the authorizing provider's specialty.  See \"Patient Info\" tab in inbasket, or \"Choose Columns\" in Meds & Orders section of the refill encounter.             Passed - Medication is active on med list       Passed - Patient is age 18 or older       Passed - No active pregnancy on record       Passed - No positive pregnancy test in past 12 months        Jefferson ANDERSON  "

## 2019-03-14 ENCOUNTER — OFFICE VISIT (OUTPATIENT)
Dept: FAMILY MEDICINE | Facility: CLINIC | Age: 59
End: 2019-03-14
Payer: COMMERCIAL

## 2019-03-14 VITALS
TEMPERATURE: 98.8 F | HEIGHT: 66 IN | DIASTOLIC BLOOD PRESSURE: 78 MMHG | OXYGEN SATURATION: 95 % | HEART RATE: 62 BPM | SYSTOLIC BLOOD PRESSURE: 139 MMHG | RESPIRATION RATE: 18 BRPM | BODY MASS INDEX: 30.74 KG/M2 | WEIGHT: 191.3 LBS

## 2019-03-14 DIAGNOSIS — Z78.0 ASYMPTOMATIC POSTMENOPAUSAL STATUS: ICD-10-CM

## 2019-03-14 DIAGNOSIS — E03.9 ACQUIRED HYPOTHYROIDISM: Primary | ICD-10-CM

## 2019-03-14 DIAGNOSIS — Z00.00 ROUTINE GENERAL MEDICAL EXAMINATION AT A HEALTH CARE FACILITY: ICD-10-CM

## 2019-03-14 DIAGNOSIS — N18.30 CKD (CHRONIC KIDNEY DISEASE) STAGE 3, GFR 30-59 ML/MIN (H): ICD-10-CM

## 2019-03-14 DIAGNOSIS — E03.9 HYPOTHYROIDISM, UNSPECIFIED TYPE: ICD-10-CM

## 2019-03-14 DIAGNOSIS — E78.5 HYPERLIPIDEMIA LDL GOAL <130: ICD-10-CM

## 2019-03-14 DIAGNOSIS — Z12.11 SCREEN FOR COLON CANCER: ICD-10-CM

## 2019-03-14 LAB
ALBUMIN SERPL-MCNC: 3.8 G/DL (ref 3.4–5)
ALP SERPL-CCNC: 86 U/L (ref 40–150)
ALT SERPL W P-5'-P-CCNC: 42 U/L (ref 0–50)
ANION GAP SERPL CALCULATED.3IONS-SCNC: 6 MMOL/L (ref 3–14)
AST SERPL W P-5'-P-CCNC: 27 U/L (ref 0–45)
BILIRUB SERPL-MCNC: 0.8 MG/DL (ref 0.2–1.3)
BUN SERPL-MCNC: 13 MG/DL (ref 7–30)
CALCIUM SERPL-MCNC: 9.3 MG/DL (ref 8.5–10.1)
CHLORIDE SERPL-SCNC: 106 MMOL/L (ref 94–109)
CHOLEST SERPL-MCNC: 182 MG/DL
CO2 SERPL-SCNC: 27 MMOL/L (ref 20–32)
CREAT SERPL-MCNC: 0.88 MG/DL (ref 0.52–1.04)
ERYTHROCYTE [DISTWIDTH] IN BLOOD BY AUTOMATED COUNT: 13.3 % (ref 10–15)
GFR SERPL CREATININE-BSD FRML MDRD: 71 ML/MIN/{1.73_M2}
GLUCOSE SERPL-MCNC: 98 MG/DL (ref 70–99)
HCT VFR BLD AUTO: 41 % (ref 35–47)
HDLC SERPL-MCNC: 53 MG/DL
HGB BLD-MCNC: 13.5 G/DL (ref 11.7–15.7)
LDLC SERPL CALC-MCNC: 110 MG/DL
MCH RBC QN AUTO: 28.2 PG (ref 26.5–33)
MCHC RBC AUTO-ENTMCNC: 32.9 G/DL (ref 31.5–36.5)
MCV RBC AUTO: 86 FL (ref 78–100)
NONHDLC SERPL-MCNC: 129 MG/DL
PLATELET # BLD AUTO: 262 10E9/L (ref 150–450)
POTASSIUM SERPL-SCNC: 3.9 MMOL/L (ref 3.4–5.3)
PROT SERPL-MCNC: 7.8 G/DL (ref 6.8–8.8)
RBC # BLD AUTO: 4.78 10E12/L (ref 3.8–5.2)
SODIUM SERPL-SCNC: 139 MMOL/L (ref 133–144)
TRIGL SERPL-MCNC: 93 MG/DL
TSH SERPL DL<=0.005 MIU/L-ACNC: 2.08 MU/L (ref 0.4–4)
WBC # BLD AUTO: 6.7 10E9/L (ref 4–11)

## 2019-03-14 PROCEDURE — 99396 PREV VISIT EST AGE 40-64: CPT | Performed by: PHYSICIAN ASSISTANT

## 2019-03-14 PROCEDURE — 80053 COMPREHEN METABOLIC PANEL: CPT | Performed by: PHYSICIAN ASSISTANT

## 2019-03-14 PROCEDURE — 36415 COLL VENOUS BLD VENIPUNCTURE: CPT | Performed by: PHYSICIAN ASSISTANT

## 2019-03-14 PROCEDURE — 84443 ASSAY THYROID STIM HORMONE: CPT | Performed by: PHYSICIAN ASSISTANT

## 2019-03-14 PROCEDURE — 85027 COMPLETE CBC AUTOMATED: CPT | Performed by: PHYSICIAN ASSISTANT

## 2019-03-14 PROCEDURE — 80061 LIPID PANEL: CPT | Performed by: PHYSICIAN ASSISTANT

## 2019-03-14 RX ORDER — ASPIRIN 81 MG/1
81 TABLET ORAL DAILY
COMMUNITY

## 2019-03-14 RX ORDER — ATORVASTATIN CALCIUM 10 MG/1
TABLET, FILM COATED ORAL
Qty: 45 TABLET | Refills: 3 | Status: SHIPPED | OUTPATIENT
Start: 2019-03-14 | End: 2020-02-27

## 2019-03-14 RX ORDER — LEVOTHYROXINE SODIUM 75 UG/1
TABLET ORAL
Qty: 90 TABLET | Refills: 3 | Status: SHIPPED | OUTPATIENT
Start: 2019-03-14 | End: 2020-03-09

## 2019-03-14 ASSESSMENT — MIFFLIN-ST. JEOR: SCORE: 1454.48

## 2019-03-14 NOTE — PROGRESS NOTES
SUBJECTIVE:   CC: Selena Saenz is an 58 year old woman who presents for preventive health visit.     Physical   Annual:     Getting at least 3 servings of Calcium per day:  NO    Bi-annual eye exam:  Yes    Dental care twice a year:  NO    Sleep apnea or symptoms of sleep apnea:  None    Diet:  Regular (no restrictions)    Frequency of exercise:  1 day/week    Duration of exercise:  Less than 15 minutes    Taking medications regularly:  Yes    Medication side effects:  None    Additional concerns today:  No    PHQ-2 Total Score: 0        Today's PHQ-2 Score:   PHQ-2 ( 1999 Pfizer) 3/13/2019   Q1: Little interest or pleasure in doing things 0   Q2: Feeling down, depressed or hopeless 0   PHQ-2 Score 0   Q1: Little interest or pleasure in doing things Not at all   Q2: Feeling down, depressed or hopeless Not at all   PHQ-2 Score 0       Abuse: Current or Past(Physical, Sexual or Emotional)- No  Do you feel safe in your environment? Yes    Social History     Tobacco Use     Smoking status: Never Smoker     Smokeless tobacco: Never Used   Substance Use Topics     Alcohol use: Yes     Comment: 2-3 glasses of wine weekly     Alcohol Use 3/13/2019   If you drink alcohol do you typically have greater than 3 drinks per day OR greater than 7 drinks per week? No   No flowsheet data found.    Reviewed orders with patient.  Reviewed health maintenance and updated orders accordingly - Yes  BP Readings from Last 3 Encounters:   03/14/19 139/78   03/20/18 138/83   01/18/17 136/80    Wt Readings from Last 3 Encounters:   03/14/19 86.8 kg (191 lb 4.8 oz)   03/20/18 84.5 kg (186 lb 4.8 oz)   01/18/17 85.3 kg (188 lb)                  Allergies   Allergen Reactions     Cats      Pollen Extract/Tree Extract      Recent Labs   Lab Test 03/20/18  0744 01/18/17  0806 02/08/16  0838   * 145* 142*   HDL 53 55 53   TRIG 99 80 101   ALT 26 50 22   CR 0.86 0.97 0.83   GFRESTIMATED 67 59* 71   GFRESTBLACK 82 72 86   POTASSIUM 4.1  4.6 3.8   TSH 1.65 1.67 2.26        Mammo discussed, not appropriate for or declined by this patient.    Pertinent mammograms are reviewed under the imaging tab.  History of abnormal Pap smear: NO - age 30- 65 PAP every 3 years recommended  PAP / HPV Latest Ref Rng & Units 2/8/2016 2/13/2013   PAP - NIL NIL   HPV 16 DNA NEG Negative -   HPV 18 DNA NEG Negative -   OTHER HR HPV NEG Negative -     Reviewed and updated as needed this visit by clinical staff         Reviewed and updated as needed this visit by Provider            Review of Systems  CONSTITUTIONAL: NEGATIVE for fever, chills, change in weight  INTEGUMENTARY/SKIN: NEGATIVE for worrisome rashes, moles or lesions  EYES: NEGATIVE for vision changes or irritation  ENT: NEGATIVE for ear, mouth and throat problems  RESP: NEGATIVE for significant cough or SOB  BREAST: NEGATIVE for masses, tenderness or discharge  CV: NEGATIVE for chest pain, palpitations or peripheral edema  GI: NEGATIVE for nausea, abdominal pain, heartburn, or change in bowel habits  : NEGATIVE for unusual urinary or vaginal symptoms. No vaginal bleeding.  MUSCULOSKELETAL: NEGATIVE for significant arthralgias or myalgia  NEURO: NEGATIVE for weakness, dizziness or paresthesias  PSYCHIATRIC: NEGATIVE for changes in mood or affect      OBJECTIVE:   LMP 09/14/2013   Physical Exam  GENERAL APPEARANCE: healthy, alert and no distress  EYES: Eyes grossly normal to inspection, PERRL and conjunctivae and sclerae normal  HENT: ear canals and TM's normal, nose and mouth without ulcers or lesions, oropharynx clear and oral mucous membranes moist  NECK: no adenopathy, no asymmetry, masses, or scars and thyroid normal to palpation  RESP: lungs clear to auscultation - no rales, rhonchi or wheezes  BREAST: normal without masses, tenderness or nipple discharge and no palpable axillary masses or adenopathy  CV: regular rate and rhythm, normal S1 S2, no S3 or S4, no murmur, click or rub, no peripheral edema and  "peripheral pulses strong  ABDOMEN: soft, nontender, no hepatosplenomegaly, no masses and bowel sounds normal  MS: no musculoskeletal defects are noted and gait is age appropriate without ataxia  SKIN: no suspicious lesions or rashes  NEURO: Normal strength and tone, sensory exam grossly normal, mentation intact and speech normal  PSYCH: mentation appears normal and affect normal/bright    Diagnostic Test Results:  none     ASSESSMENT/PLAN:   1. Routine general medical examination at a health care facility      2. Screen for colon cancer    - GASTROENTEROLOGY ADULT REF PROCEDURE ONLY Claudia Gaytan [phone]; No Provider Preference    3. Asymptomatic postmenopausal status    - DEXA HIP/PELVIS/SPINE - Future; Future    4. Acquired hypothyroidism    - TSH WITH FREE T4 REFLEX    5. CKD (chronic kidney disease) stage 3, GFR 30-59 ml/min (H)    - COMPREHENSIVE METABOLIC PANEL  - CBC with platelets  - Lipid panel reflex to direct LDL Fasting    6. Hyperlipidemia LDL goal <130    - atorvastatin (LIPITOR) 10 MG tablet; TAKE 0.5 TABLETS (5 MG) BY MOUTH DAILY  Dispense: 45 tablet; Refill: 3    7. Hypothyroidism, unspecified type    - levothyroxine (SYNTHROID/LEVOTHROID) 75 MCG tablet; TAKE 75 MCG BY MOUTH DAILY  Dispense: 90 tablet; Refill: 3    COUNSELING:  Reviewed preventive health counseling, as reflected in patient instructions       Regular exercise       Healthy diet/nutrition       Vision screening       Hearing screening       Aspirin Prophylaxsis    BP Readings from Last 1 Encounters:   03/20/18 138/83     Estimated body mass index is 30.07 kg/m  as calculated from the following:    Height as of 3/20/18: 1.676 m (5' 6\").    Weight as of 3/20/18: 84.5 kg (186 lb 4.8 oz).      Weight management plan: Discussed healthy diet and exercise guidelines     reports that  has never smoked. she has never used smokeless tobacco.      Counseling Resources:  ATP IV Guidelines  Pooled Cohorts Equation Calculator  Breast " Cancer Risk Calculator  FRAX Risk Assessment  ICSI Preventive Guidelines  Dietary Guidelines for Americans, 2010  USDA's MyPlate  ASA Prophylaxis  Lung CA Screening    Ramona Ann Aaseby-Aguilera, PA-C  [facility]

## 2019-11-04 ENCOUNTER — HEALTH MAINTENANCE LETTER (OUTPATIENT)
Age: 59
End: 2019-11-04

## 2020-02-26 DIAGNOSIS — E78.5 HYPERLIPIDEMIA LDL GOAL <130: ICD-10-CM

## 2020-02-27 RX ORDER — ATORVASTATIN CALCIUM 10 MG/1
TABLET, FILM COATED ORAL
Qty: 45 TABLET | Refills: 0 | Status: SHIPPED | OUTPATIENT
Start: 2020-02-27 | End: 2020-05-26

## 2020-03-07 DIAGNOSIS — E03.9 HYPOTHYROIDISM, UNSPECIFIED TYPE: ICD-10-CM

## 2020-03-09 RX ORDER — LEVOTHYROXINE SODIUM 75 UG/1
TABLET ORAL
Qty: 90 TABLET | Refills: 0 | Status: SHIPPED | OUTPATIENT
Start: 2020-03-09 | End: 2020-06-08

## 2020-03-09 NOTE — TELEPHONE ENCOUNTER
Medication is being filled for 1 time refill only due to:  Patient needs to be seen because it has been more than one year since last visit.  Sixto Shields RN, BSN

## 2020-04-21 ENCOUNTER — TELEPHONE (OUTPATIENT)
Dept: FAMILY MEDICINE | Facility: CLINIC | Age: 60
End: 2020-04-21

## 2020-04-21 NOTE — TELEPHONE ENCOUNTER
Rose Marie do you want visit before July or send RF to cover until PX     She has not been seen in clinic [facility] 3/14/19    Radha Levy RN

## 2020-04-21 NOTE — TELEPHONE ENCOUNTER
Patient called today.    Patient has an appointment for a physical on July 7.    Patient will be out of all medications on file prescribed by Ramona Aaseby Agulera PA at  Clinic by [facility].    Patient would like a refill on all medications before this appointment.    Patient would like to pickup at [facility] Livan Casiano and Pierce Barth.    Please contact patient.    Thank you.    Central Scheduling  Chey KRUGER

## 2020-05-24 DIAGNOSIS — E78.5 HYPERLIPIDEMIA LDL GOAL <130: ICD-10-CM

## 2020-05-26 RX ORDER — ATORVASTATIN CALCIUM 10 MG/1
TABLET, FILM COATED ORAL
Qty: 45 TABLET | Refills: 0 | Status: SHIPPED | OUTPATIENT
Start: 2020-05-26 | End: 2020-07-08

## 2020-05-26 NOTE — TELEPHONE ENCOUNTER
Routing refill request to provider for review/approval because:  Labs not current:  LDL  Patient needs to be seen because it has been more than 1 year since last office visit.    Care team please assist patient in scheduling appt.  PCP please advise on refill.    Yrn KERN RN, BSN

## 2020-06-07 DIAGNOSIS — E03.9 HYPOTHYROIDISM, UNSPECIFIED TYPE: ICD-10-CM

## 2020-06-08 RX ORDER — LEVOTHYROXINE SODIUM 75 UG/1
TABLET ORAL
Qty: 30 TABLET | Refills: 0 | Status: SHIPPED | OUTPATIENT
Start: 2020-06-08 | End: 2020-07-08

## 2020-06-08 NOTE — TELEPHONE ENCOUNTER
Routing refill request to provider for review/approval because:  Concha given x1 and patient did not follow up, please advise  Patient needs to be seen because it has been more than 1 year since last office visit.    Sixto Shields RN, BSN

## 2020-06-13 ENCOUNTER — OFFICE VISIT (OUTPATIENT)
Dept: URGENT CARE | Facility: URGENT CARE | Age: 60
End: 2020-06-13
Payer: COMMERCIAL

## 2020-06-13 VITALS
HEART RATE: 73 BPM | BODY MASS INDEX: 31.25 KG/M2 | TEMPERATURE: 97.6 F | DIASTOLIC BLOOD PRESSURE: 58 MMHG | WEIGHT: 193.6 LBS | OXYGEN SATURATION: 99 % | SYSTOLIC BLOOD PRESSURE: 150 MMHG

## 2020-06-13 DIAGNOSIS — S61.011A LACERATION OF RIGHT THUMB WITHOUT FOREIGN BODY WITHOUT DAMAGE TO NAIL, INITIAL ENCOUNTER: Primary | ICD-10-CM

## 2020-06-13 PROCEDURE — 90471 IMMUNIZATION ADMIN: CPT | Performed by: PHYSICIAN ASSISTANT

## 2020-06-13 PROCEDURE — 90715 TDAP VACCINE 7 YRS/> IM: CPT | Performed by: PHYSICIAN ASSISTANT

## 2020-06-13 PROCEDURE — 12001 RPR S/N/AX/GEN/TRNK 2.5CM/<: CPT | Performed by: PHYSICIAN ASSISTANT

## 2020-06-13 NOTE — PROGRESS NOTES
SUBJECTIVE:     Chief Complaint   Patient presents with     CUT THUMB     TODAY WHILE COOKING     Selena Saenz is a 59 year old female who presents to the clinic with a laceration on the right thumb sustained 2 hour(s) ago.  This is a non-work related, self-inflicted and accidental injury.    Mechanism of injury: cutting radishes with a kitchen tool of the [employer] and was not using the guard.  Cut the tip of her finger off and is not able to get to stop bleeding .    Associated symptoms: Denies numbness, weakness, or loss of function  Last tetanus booster within 10 years: no 2001 and will update today     Past Medical History:   Diagnosis Date     Hyperlipidaemia      Hypothyroidism      Current Outpatient Medications   Medication     atorvastatin (LIPITOR) 10 MG tablet     Calcium Carbonate-Vitamin D (CALCIUM 500 + D) 500-400 MG-UNIT TABS     Coenzyme Q10 (COQ-10) 100 MG CAPS     levothyroxine (SYNTHROID/LEVOTHROID) 75 MCG tablet     Multiple Vitamin (DAILY MULTIVITAMIN PO)     aspirin 81 MG EC tablet     No current facility-administered medications for this visit.      Social History     Socioeconomic History     Marital status:      Spouse name: Not on file     Number of children: Not on file     Years of education: Not on file     Highest education level: Not on file   Occupational History     Not on file   Social Needs     Financial resource strain: Not on file     Food insecurity     Worry: Not on file     Inability: Not on file     Transportation needs     Medical: Not on file     Non-medical: Not on file   Tobacco Use     Smoking status: Never Smoker     Smokeless tobacco: Never Used   Substance and Sexual Activity     Alcohol use: Yes     Comment: 2-3 glasses of wine weekly     Drug use: No     Sexual activity: Yes     Partners: Male     Birth control/protection: Post-menopausal     Comment: hsband had vasectomy   Lifestyle     Physical activity     Days per week: Not on file     Minutes per  session: Not on file     Stress: Not on file   Relationships     Social connections     Talks on phone: Not on file     Gets together: Not on file     Attends Shinto service: Not on file     Active member of club or organization: Not on file     Attends meetings of clubs or organizations: Not on file     Relationship status: Not on file     Intimate partner violence     Fear of current or ex partner: Not on file     Emotionally abused: Not on file     Physically abused: Not on file     Forced sexual activity: Not on file   Other Topics Concern     Parent/sibling w/ CABG, MI or angioplasty before 65F 55M? Yes   Social History Narrative     since 1982 , going well, working FT-in [employer],2 kids-31 yo and 28 yo olds, no grandkids, no pets         EXAM:   The patient appears today in alert,no apparent distress distress  VITALS: BP (!) 150/58   Pulse 73   Temp 97.6  F (36.4  C)   Wt 87.8 kg (193 lb 9.6 oz)   LMP 09/14/2013   SpO2 99%   BMI 31.25 kg/m      Size of laceration: 0.5 centimeters  Characteristics of the laceration: active bleeding  Avulsion of the tip of right thumb.   No skin flap present   Tendon function intact: yes  Sensation to light touch intact: yes  Pulses intact: yes  Picture included in patient's chart: no    Assessment:  Laceration of right thumb without foreign body without damage to nail, initial encounter    PLAN:  PROCEDURE NOTE::  Wound cleaned with HIBICLENS  Wound soaked  Gel foam placed over thumb and  Hemostasis obtained.  Pressure bandage applied and Adacel updated.    After care instructions:  Keep wound clean and dry for the next 24-48 hours  Signs of infection discussed today  May return to work as long as wound is kept clean and dry

## 2020-07-05 ASSESSMENT — ENCOUNTER SYMPTOMS
NAUSEA: 0
COUGH: 0
SORE THROAT: 0
DYSURIA: 0
WEAKNESS: 0
HEMATOCHEZIA: 0
HEARTBURN: 0
EYE PAIN: 0
DIZZINESS: 0
DIARRHEA: 0
CONSTIPATION: 0
MYALGIAS: 1
SHORTNESS OF BREATH: 0
ABDOMINAL PAIN: 0
PALPITATIONS: 0
BREAST MASS: 0
FEVER: 0
PARESTHESIAS: 0
HEADACHES: 0
FREQUENCY: 0
ARTHRALGIAS: 0
NERVOUS/ANXIOUS: 0
HEMATURIA: 0
CHILLS: 0
JOINT SWELLING: 0

## 2020-07-08 ENCOUNTER — OFFICE VISIT (OUTPATIENT)
Dept: FAMILY MEDICINE | Facility: CLINIC | Age: 60
End: 2020-07-08
Payer: COMMERCIAL

## 2020-07-08 VITALS
HEART RATE: 72 BPM | BODY MASS INDEX: 32.65 KG/M2 | TEMPERATURE: 98.6 F | RESPIRATION RATE: 16 BRPM | HEIGHT: 65 IN | OXYGEN SATURATION: 98 % | DIASTOLIC BLOOD PRESSURE: 82 MMHG | SYSTOLIC BLOOD PRESSURE: 126 MMHG | WEIGHT: 196 LBS

## 2020-07-08 DIAGNOSIS — E78.5 HYPERLIPIDEMIA LDL GOAL <130: ICD-10-CM

## 2020-07-08 DIAGNOSIS — Z12.11 SCREEN FOR COLON CANCER: ICD-10-CM

## 2020-07-08 DIAGNOSIS — Z11.51 SCREENING FOR HUMAN PAPILLOMAVIRUS: ICD-10-CM

## 2020-07-08 DIAGNOSIS — Z12.31 VISIT FOR SCREENING MAMMOGRAM: ICD-10-CM

## 2020-07-08 DIAGNOSIS — Z12.11 SPECIAL SCREENING FOR MALIGNANT NEOPLASMS, COLON: ICD-10-CM

## 2020-07-08 DIAGNOSIS — E03.9 HYPOTHYROIDISM, UNSPECIFIED TYPE: ICD-10-CM

## 2020-07-08 DIAGNOSIS — Z13.0 SCREENING FOR BLOOD DISEASE: ICD-10-CM

## 2020-07-08 DIAGNOSIS — Z00.00 ROUTINE GENERAL MEDICAL EXAMINATION AT A HEALTH CARE FACILITY: Primary | ICD-10-CM

## 2020-07-08 DIAGNOSIS — N95.1 POST MENOPAUSAL SYNDROME: ICD-10-CM

## 2020-07-08 DIAGNOSIS — N18.30 CKD (CHRONIC KIDNEY DISEASE) STAGE 3, GFR 30-59 ML/MIN (H): ICD-10-CM

## 2020-07-08 PROCEDURE — 99396 PREV VISIT EST AGE 40-64: CPT | Performed by: PHYSICIAN ASSISTANT

## 2020-07-08 PROCEDURE — G0145 SCR C/V CYTO,THINLAYER,RESCR: HCPCS | Performed by: PHYSICIAN ASSISTANT

## 2020-07-08 PROCEDURE — 87624 HPV HI-RISK TYP POOLED RSLT: CPT | Performed by: PHYSICIAN ASSISTANT

## 2020-07-08 RX ORDER — ATORVASTATIN CALCIUM 10 MG/1
5 TABLET, FILM COATED ORAL DAILY
Qty: 45 TABLET | Refills: 3 | Status: SHIPPED | OUTPATIENT
Start: 2020-07-08 | End: 2021-07-15

## 2020-07-08 RX ORDER — LEVOTHYROXINE SODIUM 75 UG/1
TABLET ORAL
Qty: 90 TABLET | Refills: 3 | Status: SHIPPED | OUTPATIENT
Start: 2020-07-08 | End: 2021-07-15

## 2020-07-08 ASSESSMENT — MIFFLIN-ST. JEOR: SCORE: 1454.93

## 2020-07-08 NOTE — PROGRESS NOTES
SUBJECTIVE:   CC: Selena Saenz is an 59 year old woman who presents for preventive health visit.     Healthy Habits:     Getting at least 3 servings of Calcium per day:  NO    Bi-annual eye exam:  Yes    Dental care twice a year:  NO    Sleep apnea or symptoms of sleep apnea:  None    Diet:  Regular (no restrictions)    Frequency of exercise:  1 day/week    Duration of exercise:  15-30 minutes    Taking medications regularly:  Yes    Medication side effects:  None    PHQ-2 Total Score: 0    Additional concerns today:  No              Today's PHQ-2 Score:   PHQ-2 ( 1999 Pfizer) 7/5/2020   Q1: Little interest or pleasure in doing things 0   Q2: Feeling down, depressed or hopeless 0   PHQ-2 Score 0   Q1: Little interest or pleasure in doing things Not at all   Q2: Feeling down, depressed or hopeless Not at all   PHQ-2 Score 0       Abuse: Current or Past(Physical, Sexual or Emotional)- No  Do you feel safe in your environment? Yes    Have you ever done Advance Care Planning? (For example, a Health Directive, POLST, or a discussion with a medical provider or your loved ones about your wishes): Yes, advance care planning is on file.    Social History     Tobacco Use     Smoking status: Never Smoker     Smokeless tobacco: Never Used   Substance Use Topics     Alcohol use: Yes     Comment: 2-3 glasses of wine weekly         Alcohol Use 7/5/2020   Prescreen: >3 drinks/day or >7 drinks/week? No   Prescreen: >3 drinks/day or >7 drinks/week? -       Reviewed orders with patient.  Reviewed health maintenance and updated orders accordingly - Yes  BP Readings from Last 3 Encounters:   07/08/20 126/82   06/13/20 (!) 150/58   03/14/19 139/78    Wt Readings from Last 3 Encounters:   07/08/20 88.9 kg (196 lb)   06/13/20 87.8 kg (193 lb 9.6 oz)   03/14/19 86.8 kg (191 lb 4.8 oz)                  Current Outpatient Medications   Medication Sig Dispense Refill     atorvastatin (LIPITOR) 10 MG tablet Take 0.5 tablets (5 mg) by  mouth daily TAKE 1/2 TABLET BY MOUTH DAILY 45 tablet 3     Calcium Carbonate-Vitamin D (CALCIUM 500 + D) 500-400 MG-UNIT TABS Take  by mouth daily.       Coenzyme Q10 (COQ-10) 100 MG CAPS        levothyroxine (SYNTHROID/LEVOTHROID) 75 MCG tablet TAKE 1 TABLET BY MOUTH EVERY DAY 90 tablet 3     Multiple Vitamin (DAILY MULTIVITAMIN PO) Take  by mouth daily.       aspirin 81 MG EC tablet Take 81 mg by mouth daily       Recent Labs   Lab Test 03/14/19  0902 03/20/18  0744 01/18/17  0806   * 157* 145*   HDL 53 53 55   TRIG 93 99 80   ALT 42 26 50   CR 0.88 0.86 0.97   GFRESTIMATED 71 67 59*   GFRESTBLACK 83 82 72   POTASSIUM 3.9 4.1 4.6   TSH 2.08 1.65 1.67        Mammogram Screening: Patient over age 50, mutual decision to screen reflected in health maintenance.    Pertinent mammograms are reviewed under the imaging tab.  History of abnormal Pap smear: NO - age 30-65 PAP every 5 years with negative HPV co-testing recommended  PAP / HPV Latest Ref Rng & Units 2/8/2016 2/13/2013   PAP - NIL NIL   HPV 16 DNA NEG Negative -   HPV 18 DNA NEG Negative -   OTHER HR HPV NEG Negative -     Reviewed and updated as needed this visit by clinical staff         Reviewed and updated as needed this visit by Provider            Review of Systems  CONSTITUTIONAL: NEGATIVE for fever, chills, change in weight  INTEGUMENTARY/SKIN: NEGATIVE for worrisome rashes, moles or lesions  EYES: NEGATIVE for vision changes or irritation  ENT: NEGATIVE for ear, mouth and throat problems  RESP: NEGATIVE for significant cough or SOB  BREAST: NEGATIVE for masses, tenderness or discharge  CV: NEGATIVE for chest pain, palpitations or peripheral edema  GI: NEGATIVE for nausea, abdominal pain, heartburn, or change in bowel habits  : NEGATIVE for unusual urinary or vaginal symptoms. No vaginal bleeding.  MUSCULOSKELETAL: NEGATIVE for significant arthralgias or myalgia  NEURO: NEGATIVE for weakness, dizziness or paresthesias  PSYCHIATRIC: NEGATIVE for  changes in mood or affect      OBJECTIVE:   LMP 09/14/2013   Physical Exam  GENERAL APPEARANCE: healthy, alert and no distress  EYES: Eyes grossly normal to inspection, PERRL and conjunctivae and sclerae normal  HENT: ear canals and TM's normal, nose and mouth without ulcers or lesions, oropharynx clear and oral mucous membranes moist  NECK: no adenopathy, no asymmetry, masses, or scars and thyroid normal to palpation  RESP: lungs clear to auscultation - no rales, rhonchi or wheezes  BREAST: normal without masses, tenderness or nipple discharge and no palpable axillary masses or adenopathy  CV: regular rate and rhythm, normal S1 S2, no S3 or S4, no murmur, click or rub, no peripheral edema and peripheral pulses strong  ABDOMEN: soft, nontender, no hepatosplenomegaly, no masses and bowel sounds normal  MS: no musculoskeletal defects are noted and gait is age appropriate without ataxia  SKIN: no suspicious lesions or rashes  NEURO: Normal strength and tone, sensory exam grossly normal, mentation intact and speech normal  PSYCH: mentation appears normal and affect normal/bright    Diagnostic Test Results:  Labs reviewed in Epic    ASSESSMENT/PLAN:   1. Routine general medical examination at a health care facility      2. Hypothyroidism, unspecified type    - levothyroxine (SYNTHROID/LEVOTHROID) 75 MCG tablet; TAKE 1 TABLET BY MOUTH EVERY DAY  Dispense: 90 tablet; Refill: 3  - TSH WITH FREE T4 REFLEX; Future    3. Hyperlipidemia LDL goal <130    - atorvastatin (LIPITOR) 10 MG tablet; Take 0.5 tablets (5 mg) by mouth daily TAKE 1/2 TABLET BY MOUTH DAILY  Dispense: 45 tablet; Refill: 3  - Lipid Profile (Chol, Trig, HDL, LDL calc); Future        5. Special screening for malignant neoplasms, colon    - GASTROENTEROLOGY ADULT REF PROCEDURE ONLY; Future    6. Visit for screening mammogram    - MA Screen Bilateral w/Jagdish; Future    7. CKD (chronic kidney disease) stage 3, GFR 30-59 ml/min (H)    - COMPREHENSIVE METABOLIC  "PANEL; Future    8. Post menopausal syndrome    - DEXA HIP/PELVIS/SPINE - Future; Future    9. Screening for blood disease    - CBC with platelets; Future    COUNSELING:  Reviewed preventive health counseling, as reflected in patient instructions       Vision screening       Hearing screening       Osteoporosis Prevention/Bone Health       Colon cancer screening    Estimated body mass index is 31.25 kg/m  as calculated from the following:    Height as of 3/14/19: 1.676 m (5' 6\").    Weight as of 6/13/20: 87.8 kg (193 lb 9.6 oz).    Weight management plan: Discussed healthy diet and exercise guidelines     reports that she has never smoked. She has never used smokeless tobacco.      Counseling Resources:  ATP IV Guidelines  Pooled Cohorts Equation Calculator  Breast Cancer Risk Calculator  FRAX Risk Assessment  ICSI Preventive Guidelines  Dietary Guidelines for Americans, 2010  USDA's MyPlate  ASA Prophylaxis  Lung CA Screening    Ramona Ann Aaseby-Aguilera, PA-C  [facility]  "

## 2020-07-12 DIAGNOSIS — N18.30 CKD (CHRONIC KIDNEY DISEASE) STAGE 3, GFR 30-59 ML/MIN (H): ICD-10-CM

## 2020-07-12 DIAGNOSIS — E03.9 HYPOTHYROIDISM, UNSPECIFIED TYPE: ICD-10-CM

## 2020-07-12 DIAGNOSIS — E78.5 HYPERLIPIDEMIA LDL GOAL <130: ICD-10-CM

## 2020-07-12 DIAGNOSIS — Z13.0 SCREENING FOR BLOOD DISEASE: ICD-10-CM

## 2020-07-12 LAB
ALBUMIN SERPL-MCNC: 3.6 G/DL (ref 3.4–5)
ALP SERPL-CCNC: 83 U/L (ref 40–150)
ALT SERPL W P-5'-P-CCNC: 50 U/L (ref 0–50)
ANION GAP SERPL CALCULATED.3IONS-SCNC: 4 MMOL/L (ref 3–14)
AST SERPL W P-5'-P-CCNC: 27 U/L (ref 0–45)
BILIRUB SERPL-MCNC: 0.7 MG/DL (ref 0.2–1.3)
BUN SERPL-MCNC: 14 MG/DL (ref 7–30)
CALCIUM SERPL-MCNC: 8.9 MG/DL (ref 8.5–10.1)
CHLORIDE SERPL-SCNC: 107 MMOL/L (ref 94–109)
CHOLEST SERPL-MCNC: 168 MG/DL
CO2 SERPL-SCNC: 27 MMOL/L (ref 20–32)
CREAT SERPL-MCNC: 0.83 MG/DL (ref 0.52–1.04)
ERYTHROCYTE [DISTWIDTH] IN BLOOD BY AUTOMATED COUNT: 13.5 % (ref 10–15)
GFR SERPL CREATININE-BSD FRML MDRD: 75 ML/MIN/{1.73_M2}
GLUCOSE SERPL-MCNC: 96 MG/DL (ref 70–99)
HCT VFR BLD AUTO: 40.8 % (ref 35–47)
HDLC SERPL-MCNC: 51 MG/DL
HGB BLD-MCNC: 13.2 G/DL (ref 11.7–15.7)
LDLC SERPL CALC-MCNC: 96 MG/DL
MCH RBC QN AUTO: 28 PG (ref 26.5–33)
MCHC RBC AUTO-ENTMCNC: 32.4 G/DL (ref 31.5–36.5)
MCV RBC AUTO: 87 FL (ref 78–100)
NONHDLC SERPL-MCNC: 117 MG/DL
PLATELET # BLD AUTO: 232 10E9/L (ref 150–450)
POTASSIUM SERPL-SCNC: 4.3 MMOL/L (ref 3.4–5.3)
PROT SERPL-MCNC: 7.5 G/DL (ref 6.8–8.8)
RBC # BLD AUTO: 4.71 10E12/L (ref 3.8–5.2)
SODIUM SERPL-SCNC: 138 MMOL/L (ref 133–144)
TRIGL SERPL-MCNC: 103 MG/DL
TSH SERPL DL<=0.005 MIU/L-ACNC: 1.11 MU/L (ref 0.4–4)
WBC # BLD AUTO: 6.4 10E9/L (ref 4–11)

## 2020-07-12 PROCEDURE — 80061 LIPID PANEL: CPT | Performed by: PHYSICIAN ASSISTANT

## 2020-07-12 PROCEDURE — 36415 COLL VENOUS BLD VENIPUNCTURE: CPT | Performed by: PHYSICIAN ASSISTANT

## 2020-07-12 PROCEDURE — 85027 COMPLETE CBC AUTOMATED: CPT | Performed by: PHYSICIAN ASSISTANT

## 2020-07-12 PROCEDURE — 84443 ASSAY THYROID STIM HORMONE: CPT | Performed by: PHYSICIAN ASSISTANT

## 2020-07-12 PROCEDURE — 80053 COMPREHEN METABOLIC PANEL: CPT | Performed by: PHYSICIAN ASSISTANT

## 2020-07-13 LAB
COPATH REPORT: NORMAL
PAP: NORMAL

## 2020-07-15 LAB
FINAL DIAGNOSIS: NORMAL
HPV HR 12 DNA CVX QL NAA+PROBE: NEGATIVE
HPV16 DNA SPEC QL NAA+PROBE: NEGATIVE
HPV18 DNA SPEC QL NAA+PROBE: NEGATIVE
SPECIMEN DESCRIPTION: NORMAL
SPECIMEN SOURCE CVX/VAG CYTO: NORMAL

## 2020-07-20 DIAGNOSIS — Z11.59 ENCOUNTER FOR SCREENING FOR OTHER VIRAL DISEASES: Primary | ICD-10-CM

## 2020-07-28 ENCOUNTER — E-VISIT (OUTPATIENT)
Dept: FAMILY MEDICINE | Facility: CLINIC | Age: 60
End: 2020-07-28
Payer: COMMERCIAL

## 2020-07-28 DIAGNOSIS — M25.539 PAIN IN WRIST, UNSPECIFIED LATERALITY: ICD-10-CM

## 2020-07-28 DIAGNOSIS — M25.519 ACUTE SHOULDER PAIN, UNSPECIFIED LATERALITY: Primary | ICD-10-CM

## 2020-07-28 PROCEDURE — 99421 OL DIG E/M SVC 5-10 MIN: CPT | Performed by: PHYSICIAN ASSISTANT

## 2020-08-07 ENCOUNTER — HOSPITAL ENCOUNTER (OUTPATIENT)
Dept: MAMMOGRAPHY | Facility: CLINIC | Age: 60
Discharge: HOME | End: 2020-08-07
Attending: PHYSICIAN ASSISTANT | Admitting: PHYSICIAN ASSISTANT
Payer: COMMERCIAL

## 2020-08-07 ENCOUNTER — ANCILLARY PROCEDURE (OUTPATIENT)
Dept: BONE DENSITY | Facility: CLINIC | Age: 60
End: 2020-08-07
Attending: PHYSICIAN ASSISTANT
Payer: COMMERCIAL

## 2020-08-07 DIAGNOSIS — N95.1 POST MENOPAUSAL SYNDROME: ICD-10-CM

## 2020-08-07 DIAGNOSIS — Z12.31 VISIT FOR SCREENING MAMMOGRAM: ICD-10-CM

## 2020-08-07 PROCEDURE — 77063 BREAST TOMOSYNTHESIS BI: CPT

## 2020-08-12 ENCOUNTER — ANCILLARY PROCEDURE (OUTPATIENT)
Dept: GENERAL RADIOLOGY | Facility: CLINIC | Age: 60
End: 2020-08-12
Attending: FAMILY MEDICINE
Payer: COMMERCIAL

## 2020-08-12 ENCOUNTER — OFFICE VISIT (OUTPATIENT)
Dept: ORTHOPEDICS | Facility: CLINIC | Age: 60
End: 2020-08-12
Payer: COMMERCIAL

## 2020-08-12 VITALS
SYSTOLIC BLOOD PRESSURE: 138 MMHG | WEIGHT: 196 LBS | HEIGHT: 65 IN | DIASTOLIC BLOOD PRESSURE: 72 MMHG | BODY MASS INDEX: 32.65 KG/M2

## 2020-08-12 DIAGNOSIS — M25.531 WRIST PAIN, ACUTE, RIGHT: ICD-10-CM

## 2020-08-12 DIAGNOSIS — G56.01 CARPAL TUNNEL SYNDROME OF RIGHT WRIST: ICD-10-CM

## 2020-08-12 DIAGNOSIS — M25.519 PAIN IN JOINT, SHOULDER REGION: ICD-10-CM

## 2020-08-12 DIAGNOSIS — M75.101 ROTATOR CUFF SYNDROME OF RIGHT SHOULDER: ICD-10-CM

## 2020-08-12 DIAGNOSIS — M25.511 PAIN IN JOINT OF RIGHT SHOULDER: Primary | ICD-10-CM

## 2020-08-12 PROCEDURE — 73110 X-RAY EXAM OF WRIST: CPT | Mod: RT | Performed by: FAMILY MEDICINE

## 2020-08-12 PROCEDURE — 99204 OFFICE O/P NEW MOD 45 MIN: CPT | Performed by: FAMILY MEDICINE

## 2020-08-12 PROCEDURE — 73030 X-RAY EXAM OF SHOULDER: CPT | Mod: RT

## 2020-08-12 ASSESSMENT — MIFFLIN-ST. JEOR: SCORE: 1454.93

## 2020-08-12 NOTE — PROGRESS NOTES
ASSESSMENT & PLAN  Patient Instructions     1. Pain in joint of right shoulder    2. Wrist pain, acute, right    3. Rotator cuff syndrome of right shoulder    4. Carpal tunnel syndrome of right wrist      -Patient is right shoulder pain due to tendinitis of rotator cuff muscles and numbness and tingling in the right hand likely due to carpal tunnel syndrome.  However, she may have a component of a pinched nerve in her neck.  -Patient will start formal physical therapy and formal hand therapy.  -Patient will start 2 tablets of Aleve twice a day for the next 2 weeks and then continue on an as-needed basis as pain improves.  Patient will start tizanidine 4 mg at bedtime.  -Patient will follow-up in 4 weeks for reevaluation.  -Call direct clinic number [[phone]] at any time with questions or concerns.    Albert Yeo MD [facility]  [facility]          -----    SUBJECTIVE  Selena Saenz is a/an 59 year old Right handed female who is seen in consultation at the request of  Ramona Ann Aaseby-Aguil* PA-C for evaluation of right shoulder and wrist pain. The patient is seen by themselves.      Shoulder  Onset: 6 month(s) ago. Reports insidious onset without acute precipitating event.  Location of Pain: right deltoid, generalized upper arm pain  Rating of Pain at worst: 4/10  Rating of Pain Currently: 2/10  Worsened by: pain at night, adduction of arm.   Better with: advil, aleve,   Treatments tried: ibuprofen and Aleve  Associated symptoms: locking or catching, can feel a snapping sensation with movement at times  Orthopedic history: NO  Relevant surgical history: NO  Social history: social history: works at manager at [employer]      Wrist  Onset: 2 to 3  week(s) ago. Reports insidious onset without acute precipitating event.  Location of Pain: right wrist pain and index and middle finger are numb and tingling  Rating of Pain at worst: 6/10  Rating of Pain  Currently: 2/10  Worsened by: bumping, brushing,   Better with: not banging,   Treatments tried: rest/activity avoidance, ibuprofen and Aleve  Associated symptoms: numbness and tingling  Orthopedic history: NO  Relevant surgical history: NO    Past Medical History:   Diagnosis Date     Hyperlipidaemia      Hypothyroidism      Social History     Socioeconomic History     Marital status:      Spouse name: Not on file     Number of children: Not on file     Years of education: Not on file     Highest education level: Not on file   Occupational History     Not on file   Social Needs     Financial resource strain: Not on file     Food insecurity     Worry: Not on file     Inability: Not on file     Transportation needs     Medical: Not on file     Non-medical: Not on file   Tobacco Use     Smoking status: Never Smoker     Smokeless tobacco: Never Used   Substance and Sexual Activity     Alcohol use: Yes     Comment: 2-3 glasses of wine weekly     Drug use: No     Sexual activity: Yes     Partners: Male     Birth control/protection: Post-menopausal     Comment: hsband had vasectomy   Lifestyle     Physical activity     Days per week: Not on file     Minutes per session: Not on file     Stress: Not on file   Relationships     Social connections     Talks on phone: Not on file     Gets together: Not on file     Attends Jehovah's witness service: Not on file     Active member of club or organization: Not on file     Attends meetings of clubs or organizations: Not on file     Relationship status: Not on file     Intimate partner violence     Fear of current or ex partner: Not on file     Emotionally abused: Not on file     Physically abused: Not on file     Forced sexual activity: Not on file   Other Topics Concern     Parent/sibling w/ CABG, MI or angioplasty before 65F 55M? Yes   Social History Narrative     since 1982 , going well, working FT-in [employer],2 kids-29 yo and 28 yo olds, no grandkids, no pets  "        Patient's past medical, surgical, social, and family histories were reviewed today and no changes are noted.    REVIEW OF SYSTEMS:  10 point ROS is negative other than symptoms noted above in HPI, Past Medical History or as stated below  Constitutional: NEGATIVE for fever, chills, change in weight  Skin: NEGATIVE for worrisome rashes, moles or lesions  GI/: NEGATIVE for bowel or bladder changes  Neuro: NEGATIVE for weakness, dizziness or paresthesias    OBJECTIVE:  /72   Ht 1.651 m (5' 5\")   Wt 88.9 kg (196 lb)   LMP 09/14/2013   BMI 32.62 kg/m     General: healthy, alert and in no distress  HEENT: no scleral icterus or conjunctival erythema  Skin: no suspicious lesions or rash. No jaundice.  CV: regular rhythm by palpation  Resp: normal respiratory effort without conversational dyspnea   Psych: normal mood and affect  Gait: normal steady gait with appropriate coordination and balance  Neuro: normal light touch sensory exam of the bilateral upper extremities.    MSK:  RIGHT SHOULDER  Inspection:    no swelling, bruising, discoloration, or obvious deformity or asymmetry  Palpation:    Tender about the anterior capsule and supraspinatus insertion. Remainder of bony and tendinous landmarks are nontender.  Active Range of Motion:     Abduction 1650, FF normal0, ER normal0, IR normal.      Scapular dyskinesis absent  Strength:    Scapular plane abduction grossly intact and mildly painful,  ER grossly intact, IR grossly intact, biceps grossly intact, triceps grossly intact  Special Tests:    Positive: Neer's, supraspinatus (empty can), crossed arm adduction and [place]'s    Negative: Garcia', Speed's and Yergason's    CERVICAL SPINE  Inspection:    normal cervical lordosis present, rounded shoulders, forward head posture  Palpation:     landmarks are nontender.  Range of Motion:     Flexion within normal limits    Extension within normal limits    Right side bend within normal limits    Left side bend " within normal limits    Right rotation within normal limits    Left rotation within normal limits  Strength:    Full strength throughout all neck muscles  Special Tests:    Positive: None    Negative: Spurling's (bilateral), Jackson's (bilateral)    RIGHT WRIST  Inspection:    No swelling, bruising, discoloration, or obvious deformity or asymmetry  Palpation:    bony and ligamentous line marks are nontender.    Crepitus is Absent    Metacarpals: normal    Thumb: normal    Fingers: normal  Range of Motion:    Full (active and passive) flexion, extension, pronation/supination, and ulnar/radial deviation.  Strength:    No deficits in flexion, extension, ulnar/radial deviation, or  strength.  Special Tests:    Positive: Phalen's    Negative: Tinel's (carpal tunnel), Finkelstein's, thenar eminence wasting, hypothenar eminence wasting    Independent visualization of the below image:  Recent Results (from the past 24 hour(s))   XR Shoulder Right G/E 3 Views    Narrative    XR SHOULDER RT G/E 3 VW 8/12/2020 8:27 AM     HISTORY: Pain in joint, shoulder region      Impression    IMPRESSION: Negative exam.    URMILA RIBEIRO MD   XR Wrist Right G/E 3 Views    Narrative    No acute fracture, dislocation or osseous abnormalities.           Albert Yeo MD [facility]

## 2020-08-12 NOTE — PATIENT INSTRUCTIONS
1. Pain in joint of right shoulder    2. Wrist pain, acute, right    3. Rotator cuff syndrome of right shoulder    4. Carpal tunnel syndrome of right wrist      -Patient is right shoulder pain due to tendinitis of rotator cuff muscles and numbness and tingling in the right hand likely due to carpal tunnel syndrome.  However, she may have a component of a pinched nerve in her neck.  -Patient will start formal physical therapy and formal hand therapy.  -Patient will start 2 tablets of Aleve twice a day for the next 2 weeks and then continue on an as-needed basis as pain improves.  Patient will start tizanidine 4 mg at bedtime.  -Patient will follow-up in 4 weeks for reevaluation.  -Call direct clinic number [[phone]] at any time with questions or concerns.    Albert Yeo MD [facility] and [facility] Medicine  [facility] [place]

## 2020-08-12 NOTE — LETTER
8/12/2020         RE: Selena Saenz  [address] [address] [id number]        Dear Colleague,    Thank you for referring your patient, Selena Saenz, to the [facility]. Please see a copy of my visit note below.    ASSESSMENT & PLAN  Patient Instructions     1. Pain in joint of right shoulder    2. Wrist pain, acute, right    3. Rotator cuff syndrome of right shoulder    4. Carpal tunnel syndrome of right wrist      -Patient is right shoulder pain due to tendinitis of rotator cuff muscles and numbness and tingling in the right hand likely due to carpal tunnel syndrome.  However, she may have a component of a pinched nerve in her neck.  -Patient will start formal physical therapy and formal hand therapy.  -Patient will start 2 tablets of Aleve twice a day for the next 2 weeks and then continue on an as-needed basis as pain improves.  Patient will start tizanidine 4 mg at bedtime.  -Patient will follow-up in 4 weeks for reevaluation.  -Call direct clinic number [[phone]] at any time with questions or concerns.    Albert Yeo MD [facility] Medicine  [facility] [place]          -----    SUBJECTIVE  Selena Saenz is a/an 59 year old Right handed female who is seen in consultation at the request of  Ramona Ann Aaseby-Aguil* PA-C for evaluation of right shoulder and wrist pain. The patient is seen by themselves.      Shoulder  Onset: 6 month(s) ago. Reports insidious onset without acute precipitating event.  Location of Pain: right deltoid, generalized upper arm pain  Rating of Pain at worst: 4/10  Rating of Pain Currently: 2/10  Worsened by: pain at night, adduction of arm.   Better with: advil, aleve,   Treatments tried: ibuprofen and Aleve  Associated symptoms: locking or catching, can feel a snapping sensation with movement at times  Orthopedic history: NO  Relevant surgical history: NO  Social history: social history: works at manager  at [employer]      Wrist  Onset: 2 to 3  week(s) ago. Reports insidious onset without acute precipitating event.  Location of Pain: right wrist pain and index and middle finger are numb and tingling  Rating of Pain at worst: 6/10  Rating of Pain Currently: 2/10  Worsened by: bumping, brushing,   Better with: not banging,   Treatments tried: rest/activity avoidance, ibuprofen and Aleve  Associated symptoms: numbness and tingling  Orthopedic history: NO  Relevant surgical history: NO    Past Medical History:   Diagnosis Date     Hyperlipidaemia      Hypothyroidism      Social History     Socioeconomic History     Marital status:      Spouse name: Not on file     Number of children: Not on file     Years of education: Not on file     Highest education level: Not on file   Occupational History     Not on file   Social Needs     Financial resource strain: Not on file     Food insecurity     Worry: Not on file     Inability: Not on file     Transportation needs     Medical: Not on file     Non-medical: Not on file   Tobacco Use     Smoking status: Never Smoker     Smokeless tobacco: Never Used   Substance and Sexual Activity     Alcohol use: Yes     Comment: 2-3 glasses of wine weekly     Drug use: No     Sexual activity: Yes     Partners: Male     Birth control/protection: Post-menopausal     Comment: hsband had vasectomy   Lifestyle     Physical activity     Days per week: Not on file     Minutes per session: Not on file     Stress: Not on file   Relationships     Social connections     Talks on phone: Not on file     Gets together: Not on file     Attends [employer] service: Not on file     Active member of club or organization: Not on file     Attends meetings of clubs or organizations: Not on file     Relationship status: Not on file     Intimate partner violence     Fear of current or ex partner: Not on file     Emotionally abused: Not on file     Physically abused: Not on file     Forced sexual activity:  "Not on file   Other Topics Concern     Parent/sibling w/ CABG, MI or angioplasty before 65F 55M? Yes   Social History Narrative     since 1982 , going well, working FT-in [employer],2 kids-29 yo and 26 yo olds, no grandkids, no pets         Patient's past medical, surgical, social, and family histories were reviewed today and no changes are noted.    REVIEW OF SYSTEMS:  10 point ROS is negative other than symptoms noted above in HPI, Past Medical History or as stated below  Constitutional: NEGATIVE for fever, chills, change in weight  Skin: NEGATIVE for worrisome rashes, moles or lesions  GI/: NEGATIVE for bowel or bladder changes  Neuro: NEGATIVE for weakness, dizziness or paresthesias    OBJECTIVE:  /72   Ht 1.651 m (5' 5\")   Wt 88.9 kg (196 lb)   LMP 09/14/2013   BMI 32.62 kg/m     General: healthy, alert and in no distress  HEENT: no scleral icterus or conjunctival erythema  Skin: no suspicious lesions or rash. No jaundice.  CV: regular rhythm by palpation  Resp: normal respiratory effort without conversational dyspnea   Psych: normal mood and affect  Gait: normal steady gait with appropriate coordination and balance  Neuro: normal light touch sensory exam of the bilateral upper extremities.    MSK:  RIGHT SHOULDER  Inspection:    no swelling, bruising, discoloration, or obvious deformity or asymmetry  Palpation:    Tender about the anterior capsule and supraspinatus insertion. Remainder of bony and tendinous landmarks are nontender.  Active Range of Motion:     Abduction 1650, FF normal0, ER normal0, IR normal.      Scapular dyskinesis absent  Strength:    Scapular plane abduction grossly intact and mildly painful,  ER grossly intact, IR grossly intact, biceps grossly intact, triceps grossly intact  Special Tests:    Positive: Neer's, supraspinatus (empty can), crossed arm adduction and [place]'s    Negative: Garcia', Speed's and Yergason's    CERVICAL SPINE  Inspection:    normal " cervical lordosis present, rounded shoulders, forward head posture  Palpation:     landmarks are nontender.  Range of Motion:     Flexion within normal limits    Extension within normal limits    Right side bend within normal limits    Left side bend within normal limits    Right rotation within normal limits    Left rotation within normal limits  Strength:    Full strength throughout all neck muscles  Special Tests:    Positive: None    Negative: Spurling's (bilateral), Jackson's (bilateral)    RIGHT WRIST  Inspection:    No swelling, bruising, discoloration, or obvious deformity or asymmetry  Palpation:    bony and ligamentous line marks are nontender.    Crepitus is Absent    Metacarpals: normal    Thumb: normal    Fingers: normal  Range of Motion:    Full (active and passive) flexion, extension, pronation/supination, and ulnar/radial deviation.  Strength:    No deficits in flexion, extension, ulnar/radial deviation, or  strength.  Special Tests:    Positive: Phalen's    Negative: Tinel's (carpal tunnel), Finkelstein's, thenar eminence wasting, hypothenar eminence wasting    Independent visualization of the below image:  Recent Results (from the past 24 hour(s))   XR Shoulder Right G/E 3 Views    Narrative    XR SHOULDER RT G/E 3 VW 8/12/2020 8:27 AM     HISTORY: Pain in joint, shoulder region      Impression    IMPRESSION: Negative exam.    URMILA RIBEIRO MD   XR Wrist Right G/E 3 Views    Narrative    No acute fracture, dislocation or osseous abnormalities.           Albert Yeo MD [facility]      Again, thank you for allowing me to participate in the care of your patient.        Sincerely,        Albert Yeo, MD

## 2020-08-20 ENCOUNTER — THERAPY VISIT (OUTPATIENT)
Dept: OCCUPATIONAL THERAPY | Facility: CLINIC | Age: 60
End: 2020-08-20
Attending: FAMILY MEDICINE
Payer: COMMERCIAL

## 2020-08-20 DIAGNOSIS — G56.01 CARPAL TUNNEL SYNDROME OF RIGHT WRIST: ICD-10-CM

## 2020-08-20 DIAGNOSIS — M79.641 PAIN OF RIGHT HAND: ICD-10-CM

## 2020-08-20 PROCEDURE — 97140 MANUAL THERAPY 1/> REGIONS: CPT | Mod: GO | Performed by: OCCUPATIONAL THERAPIST

## 2020-08-20 PROCEDURE — 97110 THERAPEUTIC EXERCISES: CPT | Mod: GO | Performed by: OCCUPATIONAL THERAPIST

## 2020-08-20 PROCEDURE — 97165 OT EVAL LOW COMPLEX 30 MIN: CPT | Mod: GO | Performed by: OCCUPATIONAL THERAPIST

## 2020-08-20 PROCEDURE — 97112 NEUROMUSCULAR REEDUCATION: CPT | Mod: GO | Performed by: OCCUPATIONAL THERAPIST

## 2020-08-20 NOTE — PROGRESS NOTES
Hand Therapy Initial Evaluation  Current Date:  8/20/2020    Subjective:  Selena Saenz is a 59 year old R hand dominant female.    Diagnosis: R Carpal Tunnel Syndrome  DOI:  8/1/2020    Patient reports symptoms of numbness and tingling of the R dorsal forearm, hand, and index and long fingers which occurred due to gradual onset. Symptoms began in the shoulder and gradually moved down to the hand and fingers. Since onset symptoms are gradually getting worse. Special tests:  X-rays.  Previous treatment: None. General health as reported by patient is fair.  Pertinent medical history includes: Overweight, Thyroid Problems.  Medical allergies: adhesives.  Surgical history: orthopedic: leg.  Medication history: Anti-inflammatory, Thyroid, Statin for cholesterol.    Occupational Profile Information:  Current occupation is:   Currently working in normal job without restrictions  Job Tasks: Computer Work, Prolonged Sitting  Prior functional level:  independent-shared household chores  Barriers include: none  Mobility: No difficulty   Transportation: drives  Leisure activities/hobbies: reading, traveling, cooking, walking    Upper Extremity Functional Index Score:  SCORE:   Column Totals: /80: (P) 79   (A lower score indicates greater disability.)    Objective:  Pain Level (Scale 0-10):   8/20/2020   At Rest 0   With Use 5-6     Pain Description:  Date 8/20/2020   Location R Dorsal forearm, hand, index, long fingers. Pt reports initial pain beginning in the R shoulder.   Pain Quality Burning   Frequency intermittent     Pain is worst  daytime or nighttime   Exacerbated by bumping the forearm or hand on an object   Relieved by otc medications   Progression Gradually worsening     Posture  Forward Neck Posture, Rounded Forward Shoulders, Shoulder Height Difference and Patient's proximal musculature at shoulder appears imbalanced with tight pectoralis muscles, subscapularis, upper trapezius, latissimus and  weak scapular stabilizers.  This pattern contributes to overuse of distal musculature.    Edema  None    Sensation  Decreased Dorsal Radial Sensory Nerve distribution per pt report.    ROM  Pain Report: 0-10/10  Wrist 8/20/2020 8/20/2020   AROM (PROM) L R   Extension WNL WNL   Flexion WNL WNL   RD WNL WNL   UD WNL WNL     Palpation Radial Nerve Path   Pain level report on scale 0-10/10  Date 8/20/2020    R   Supraspinatus 5-6/10   Triangular Interval 5/10   Spiral Groove 7/10   Radial Head 0/10   PIN 4-5/10   DSRN 0/10     AROM Multi-joint  Date 8/20/2020 8/20/2020    L R   Elbow - FA - Wrist     90 - Neut - Flex 50 70   90 -  Pro  - Flex 62 60   Ext - Neut - Flex 55 60   Ext -  Pro  - Flex 60 60     Special Tests  Date 8/20/2020   Side R   Resisted Long Finger Test 0, tingling present   Tinels at DRSN +   PIN Site Compression + at 5 sec   Radial Nerve ULTT NT   Paresthesias NT     Radial Nerve Tension Test  3/5    Strength   (Measured in pounds)  Pain Report: 0-10/10   8/20/2020 8/20/2020   Trials L R   1  2  3 54 59   Average 54 59     Lat Pinch 8/20/2020 8/20/2020   Trials L R   1  2  3 17 17   Average 17 17     3 Pt Pinch 8/20/2020 8/20/2020   Trials L R   1  2  3 14 16   Average 14 16     Assessment:  Patient presents with symptoms inconsistent with diagnosis of right Carpal Tunnel Syndrome, with conservative intervention. Pt reports paresthesias are present on the dorsal aspect of the index and long fingers and pain occurs in the dorsal hand.  Provocative testing is consistent with this.     Patient's limitations or Problem List includes:  Pain and Sensory disturbance of the right forearm, hand, index finger and long finger which interferes with the patient's ability to perform Work Tasks and Household Chores as compared to previous level of function.    Rehab Potential: Excellent - Return to full activity, no limitations    Patient will benefit from skilled Occupational Therapy to increase sensation and  decrease pain to return to previous activity level and resume normal daily tasks and to reach their rehab potential.    Barriers to Learning:  No barrier    Communication Issues:  Patient appears to be able to clearly communicate and understand verbal and written communication and follow directions correctly.    Chart Review: Chart Review, Brief history including review of medical and/or therapy records relating to the presenting problem and Simple history review with patient    Identified Performance Deficits: home establishment and management and work    Assessment of Occupational Performance:  1-3 Performance Deficits    Clinical Decision Making (Complexity): Low complexity    Treatment Explanation:  The following has been discussed with the patient:  RX ordered/plan of care  Anticipated outcomes  Possible risks and side effects    Plan:  Frequency:  1 X week, once daily  Duration:  for 6 weeks    Treatment Plan:    Modalities:    None   Therapeutic Exercise:    AROM, AAROM, PROM, Tendon Gliding, Blocking, Isotonics and Isometrics  Neuromuscular re-ed:   Nerve Gliding, Posture and Kinesiotaping  Manual Techniques:   Myofascial release  Orthotic Fabrication:    Forearm based orthosis  Self Care:    Ergonomic Considerations and Work Tasks    Discharge Plan:  Achieve all LTG.  Independent in home treatment program.  Reach maximal therapeutic benefit.    Home Exercise Program:  Ball Massage   EMR Notes   HEP -  Sets 1   Reps   Sessions per day 1-2x/day   Notes 5-10 minutes   Nerve Gliding Proximal Radial   EMR Notes   HEP -  Sets 1   Reps 10   Sessions per day 1-2x/day   Notes Hold for 5 seconds     Next Visit:  Review HEP  Discuss ergonomic workspace: split ergonomic keyboard  MFR

## 2020-08-21 ENCOUNTER — THERAPY VISIT (OUTPATIENT)
Dept: PHYSICAL THERAPY | Facility: CLINIC | Age: 60
End: 2020-08-21
Attending: FAMILY MEDICINE
Payer: COMMERCIAL

## 2020-08-21 DIAGNOSIS — M75.101 ROTATOR CUFF SYNDROME OF RIGHT SHOULDER: ICD-10-CM

## 2020-08-21 PROCEDURE — 97110 THERAPEUTIC EXERCISES: CPT | Mod: GP | Performed by: PHYSICAL THERAPIST

## 2020-08-21 PROCEDURE — 97161 PT EVAL LOW COMPLEX 20 MIN: CPT | Mod: GP | Performed by: PHYSICAL THERAPIST

## 2020-08-21 NOTE — PROGRESS NOTES
[facility] Initial Evaluation  Subjective:  The history is provided by the patient. No  was used.   Patient Health History  Selena Saenz being seen for Numbness/tingling in hand.       Problem occurred: No injury   Pain is reported as 0/10 on pain scale.  General health as reported by patient is fair.  Pertinent medical history includes: numbness/tingling, overweight and thyroid problems.     Medical allergies: adhesives.   Surgeries include:  Orthopedic surgery.    Current medications:  Anti-inflammatory, thyroid medication and other. Other medications details: Statin for cholesterol.    Current occupation is .   Primary job tasks include:  Computer work and prolonged sitting.                  Therapist Generated HPI Evaluation         Type of problem:  Right shoulder.    This is a chronic condition.  Condition occurred with:  Unknown cause.    Patient reports pain:  Anterior and posterior.    Pain radiates to:  Upper arm.     Associated symptoms:  Tingling and numbness. Exacerbated by: taking off shirt, sleeping, reaching across body.  and relieved by rest and NSAID's.                              Objective:  Standing Alignment:    Cervical/thoracic deviations alignment: forward head and shoulder posture.                                         Shoulder Evaluation:  ROM:  AROM:    Flexion:  Right:  150  Extension: Right: 55  Abduction:  Right:  155      External Rotation:  Right:  60                PROM:    Flexion:  Right: 170    Extension:  Right:  60  Abduction:  Right:  170    Internal Rotation:  Right:  70  External Rotation:  Right:  70                    Strength:    Flexion: Right: 5/5     Pain:   Extension:  Right: 5/5    Pain:  Abduction:  Right: 4+/5     Pain:  Adduction:  Right: 5/5     Pain:  Internal Rotation:  Right: 5/5     Pain:  External Rotation:   Right:4/5     Pain:              Special Tests:  Special tests assessed shoulder: pain  with passive horizontal abduction.    Right shoulder positive for the following special tests:Impingement  Palpation:      Right shoulder tenderness present at: Supraspinatus                                     General     ROS    Assessment/Plan:    Patient is a 59 year old female with right side shoulder complaints.    Patient has the following significant findings with corresponding treatment plan.                Diagnosis 1:  Right rotator cuff impingement  Pain -  hot/cold therapy, manual therapy, self management, education and home program  Decreased ROM/flexibility - manual therapy, therapeutic exercise, therapeutic activity and home program  Decreased strength - therapeutic exercise, therapeutic activities and home program    Therapy Evaluation Codes:   1) History comprised of:   Personal factors that impact the plan of care:      None.    Comorbidity factors that impact the plan of care are:      None.     Medications impacting care: Muscle relaxant and asparin.  2) Examination of Body Systems comprised of:   Body structures and functions that impact the plan of care:      Shoulder.   Activity limitations that impact the plan of care are:      Sleeping and Laying down.  3) Clinical presentation characteristics are:   Stable/Uncomplicated.  4) Decision-Making    Low complexity using standardized patient assessment instrument and/or measureable assessment of functional outcome.  Cumulative Therapy Evaluation is: Low complexity.    Previous and current functional limitations:  (See Goal Flow Sheet for this information)    Short term and Long term goals: (See Goal Flow Sheet for this information)     Communication ability:  Patient appears to be able to clearly communicate and understand verbal and written communication and follow directions correctly.  Treatment Explanation - The following has been discussed with the patient:   RX ordered/plan of care  Anticipated outcomes  Possible risks and side effects  This  patient would benefit from PT intervention to resume normal activities.   Rehab potential is good.    Frequency:  1 X week, once daily  Duration:  for 6 weeks  Discharge Plan:  Achieve all LTG.  Independent in home treatment program.  Reach maximal therapeutic benefit.    Please refer to the daily flowsheet for treatment today, total treatment time and time spent performing 1:1 timed codes.

## 2020-08-25 DIAGNOSIS — Z11.59 ENCOUNTER FOR SCREENING FOR OTHER VIRAL DISEASES: ICD-10-CM

## 2020-08-25 PROCEDURE — U0003 INFECTIOUS AGENT DETECTION BY NUCLEIC ACID (DNA OR RNA); SEVERE ACUTE RESPIRATORY SYNDROME CORONAVIRUS 2 (SARS-COV-2) (CORONAVIRUS DISEASE [COVID-19]), AMPLIFIED PROBE TECHNIQUE, MAKING USE OF HIGH THROUGHPUT TECHNOLOGIES AS DESCRIBED BY CMS-2020-01-R: HCPCS | Performed by: INTERNAL MEDICINE

## 2020-08-26 ENCOUNTER — THERAPY VISIT (OUTPATIENT)
Dept: OCCUPATIONAL THERAPY | Facility: CLINIC | Age: 60
End: 2020-08-26
Payer: COMMERCIAL

## 2020-08-26 ENCOUNTER — THERAPY VISIT (OUTPATIENT)
Dept: PHYSICAL THERAPY | Facility: CLINIC | Age: 60
End: 2020-08-26
Payer: COMMERCIAL

## 2020-08-26 DIAGNOSIS — M79.641 PAIN OF RIGHT HAND: ICD-10-CM

## 2020-08-26 DIAGNOSIS — M75.101 ROTATOR CUFF SYNDROME OF RIGHT SHOULDER: ICD-10-CM

## 2020-08-26 DIAGNOSIS — G56.01 CARPAL TUNNEL SYNDROME OF RIGHT WRIST: ICD-10-CM

## 2020-08-26 LAB
SARS-COV-2 RNA SPEC QL NAA+PROBE: NOT DETECTED
SPECIMEN SOURCE: NORMAL

## 2020-08-26 PROCEDURE — 97140 MANUAL THERAPY 1/> REGIONS: CPT | Mod: GO | Performed by: OCCUPATIONAL THERAPIST

## 2020-08-26 PROCEDURE — 97110 THERAPEUTIC EXERCISES: CPT | Mod: GP | Performed by: PHYSICAL THERAPIST

## 2020-08-26 PROCEDURE — 97112 NEUROMUSCULAR REEDUCATION: CPT | Mod: GO | Performed by: OCCUPATIONAL THERAPIST

## 2020-08-28 ENCOUNTER — HOSPITAL ENCOUNTER (OUTPATIENT)
Facility: CLINIC | Age: 60
Discharge: HOME | End: 2020-08-28
Attending: INTERNAL MEDICINE | Admitting: INTERNAL MEDICINE
Payer: COMMERCIAL

## 2020-08-28 VITALS
BODY MASS INDEX: 31.34 KG/M2 | WEIGHT: 195 LBS | OXYGEN SATURATION: 95 % | HEART RATE: 58 BPM | RESPIRATION RATE: 16 BRPM | HEIGHT: 66 IN | DIASTOLIC BLOOD PRESSURE: 61 MMHG | TEMPERATURE: 97.9 F | SYSTOLIC BLOOD PRESSURE: 111 MMHG

## 2020-08-28 LAB — COLONOSCOPY: NORMAL

## 2020-08-28 PROCEDURE — G0105 COLORECTAL SCRN; HI RISK IND: HCPCS | Performed by: INTERNAL MEDICINE

## 2020-08-28 PROCEDURE — 25000128 H RX IP 250 OP 636: Performed by: INTERNAL MEDICINE

## 2020-08-28 PROCEDURE — 45378 DIAGNOSTIC COLONOSCOPY: CPT | Performed by: INTERNAL MEDICINE

## 2020-08-28 PROCEDURE — G0500 MOD SEDAT ENDO SERVICE >5YRS: HCPCS | Performed by: INTERNAL MEDICINE

## 2020-08-28 RX ORDER — NALOXONE HYDROCHLORIDE 0.4 MG/ML
.1-.4 INJECTION, SOLUTION INTRAMUSCULAR; INTRAVENOUS; SUBCUTANEOUS
Status: DISCONTINUED | OUTPATIENT
Start: 2020-08-28 | End: 2020-08-28 | Stop reason: HOSPADM

## 2020-08-28 RX ORDER — ONDANSETRON 2 MG/ML
4 INJECTION INTRAMUSCULAR; INTRAVENOUS
Status: DISCONTINUED | OUTPATIENT
Start: 2020-08-28 | End: 2020-08-28 | Stop reason: HOSPADM

## 2020-08-28 RX ORDER — FLUMAZENIL 0.1 MG/ML
0.2 INJECTION, SOLUTION INTRAVENOUS
Status: DISCONTINUED | OUTPATIENT
Start: 2020-08-28 | End: 2020-08-28 | Stop reason: HOSPADM

## 2020-08-28 RX ORDER — ONDANSETRON 2 MG/ML
4 INJECTION INTRAMUSCULAR; INTRAVENOUS EVERY 6 HOURS PRN
Status: DISCONTINUED | OUTPATIENT
Start: 2020-08-28 | End: 2020-08-28 | Stop reason: HOSPADM

## 2020-08-28 RX ORDER — FENTANYL CITRATE 50 UG/ML
INJECTION, SOLUTION INTRAMUSCULAR; INTRAVENOUS PRN
Status: DISCONTINUED | OUTPATIENT
Start: 2020-08-28 | End: 2020-08-28 | Stop reason: HOSPADM

## 2020-08-28 RX ORDER — LIDOCAINE 40 MG/G
CREAM TOPICAL
Status: DISCONTINUED | OUTPATIENT
Start: 2020-08-28 | End: 2020-08-28 | Stop reason: HOSPADM

## 2020-08-28 RX ORDER — ONDANSETRON 4 MG/1
4 TABLET, ORALLY DISINTEGRATING ORAL EVERY 6 HOURS PRN
Status: DISCONTINUED | OUTPATIENT
Start: 2020-08-28 | End: 2020-08-28 | Stop reason: HOSPADM

## 2020-08-28 ASSESSMENT — MIFFLIN-ST. JEOR: SCORE: 1466.26

## 2020-08-28 NOTE — DISCHARGE INSTRUCTIONS

## 2020-08-28 NOTE — H&P
Pre-Endoscopy History and Physical     Selena Saenz MRN# [id number]   YOB: 1960 Age: 59 year old     Date of Procedure: 8/28/2020  Primary care provider: Aaseby-Aguilera, Ramona Ann  Type of Endoscopy: Colonoscopy with possible biopsy, possible polypectomy  Reason for Procedure: polyp history  Type of Anesthesia Anticipated: Conscious Sedation    HPI:    Selena is a 59 year old female who will be undergoing the above procedure.      A history and physical has been performed. The patient's medications and allergies have been reviewed. The risks and benefits of the procedure and the sedation options and risks were discussed with the patient.  All questions were answered and informed consent was obtained.      She denies a personal or family history of anesthesia complications or bleeding disorders.     Patient Active Problem List   Diagnosis     Family history of early CAD     Hyperlipidemia LDL goal <130     History of colonic polyps     Tubular adenoma     Ovarian cyst     Acquired hypothyroidism     Non morbid obesity, unspecified obesity type     CKD (chronic kidney disease) stage 3, GFR 30-59 ml/min (H)     Pain of right hand     Carpal tunnel syndrome of right wrist     Rotator cuff syndrome of right shoulder        Past Medical History:   Diagnosis Date     Hyperlipidaemia      Hypothyroidism         Past Surgical History:   Procedure Laterality Date     BIOPSY  2009    Screen for skin cancer. Benign.     COLONOSCOPY  2014     EYE SURGERY  1999    LASIK     ORTHOPEDIC SURGERY      broken leg- 2/2001     ORTHOPEDIC SURGERY  2001    Tibia and fibula fracture. Mohsen, plate and screws in left leg     SURGICAL HISTORY OF -   2001    left lower leg surgery-has metal mohsen and scres there       Social History     Tobacco Use     Smoking status: Never Smoker     Smokeless tobacco: Never Used   Substance Use Topics     Alcohol use: Yes     Comment: 2-3 glasses of wine weekly       Family History   Problem  Relation Age of Onset     Heart Disease Mother         pacemaker     Coronary Artery Disease Mother      Hypertension Mother      Hyperlipidemia Mother      C.A.D. Father         MI-2 times, had cardiac arrest at age of 70, first heart attack at age of 51     Cerebrovascular Disease Father      Coronary Artery Disease Father      Hyperlipidemia Father      Other Cancer Father         Melanoma     Diabetes Father         type 2     Thyroid Disease Sister         one sis with thyroid issues, not cancer     Coronary Artery Disease Sister      C.A.D. Brother         MI-one, at age of 48-defibrillator in place now , smoker in past      Respiratory Brother      Coronary Artery Disease Brother      C.A.D. Sister         2-morbid obesity , smoker, MI at age of late 50's     Diabetes Sister      Thyroid Disease Sister      Osteoporosis Sister      Osteoporosis Sister      Thyroid Disease Sister      Hypertension Sister      Breast Cancer Other         maternal aunt at age of 70's     Asthma Daughter      Obesity Sister      Breast Cancer Other      Depression Daughter      Anxiety Disorder Daughter      Cancer - colorectal No family hx of      Colon Cancer No family hx of        Prior to Admission medications    Medication Sig Start Date End Date Taking? Authorizing Provider   aspirin 81 MG EC tablet Take 81 mg by mouth daily   Yes Reported, Patient   atorvastatin (LIPITOR) 10 MG tablet Take 0.5 tablets (5 mg) by mouth daily TAKE 1/2 TABLET BY MOUTH DAILY 7/8/20  Yes Aaseby-Aguilera, Ramona Ann, PA-C   Calcium Carbonate-Vitamin D (CALCIUM 500 + D) 500-400 MG-UNIT TABS Take  by mouth daily.   Yes Reported, Patient   Coenzyme Q10 (COQ-10) 100 MG CAPS    Yes Reported, Patient   levothyroxine (SYNTHROID/LEVOTHROID) 75 MCG tablet TAKE 1 TABLET BY MOUTH EVERY DAY 7/8/20  Yes Aaseby-Aguilera, Ramona Ann, PA-C   Multiple Vitamin (DAILY MULTIVITAMIN PO) Take  by mouth daily.   Yes Reported, Patient   tiZANidine (ZANAFLEX) 4 MG tablet  "Take 1 tablet (4 mg) by mouth nightly as needed for muscle spasms 8/12/20   Yeo, Albert, MD       Allergies   Allergen Reactions     Cats      Pollen Extract/Tree Extract         REVIEW OF SYSTEMS:   5 point ROS negative except as noted above in HPI, including Gen., Resp., CV, GI &  system review.    PHYSICAL EXAM:   /78   Pulse 72   Temp 97.9  F (36.6  C) (Temporal)   Resp 11   Ht 1.676 m (5' 6\")   Wt 88.5 kg (195 lb)   LMP 09/14/2013   SpO2 98%   BMI 31.47 kg/m   Estimated body mass index is 31.47 kg/m  as calculated from the following:    Height as of this encounter: 1.676 m (5' 6\").    Weight as of this encounter: 88.5 kg (195 lb).   GENERAL APPEARANCE: alert, and oriented  MENTAL STATUS: alert  AIRWAY EXAM: Mallampatti Class I (visualization of the soft palate, fauces, uvula, anterior and posterior pillars)  RESP: lungs clear to auscultation - no rales, rhonchi or wheezes  CV: regular rates and rhythm  DIAGNOSTICS:    Not indicated    IMPRESSION   ASA Class 2 - Mild systemic disease    PLAN:   Plan for Colonoscopy with possible biopsy, possible polypectomy. We discussed the risks, benefits and alternatives and the patient wished to proceed.    The above has been forwarded to the consulting provider.      Signed Electronically by: Wilmer Porter MD  August 28, 2020          "

## 2020-08-28 NOTE — LETTER
July 21, 2020      Selena Churchillshanice  [address] [facility] [address]        Dear Selena,     Please be aware that coverage of these services is subject to the terms and limitations of your health insurance plan.  Call member services at your health plan with any benefit or coverage questions.    Thank you for choosing [facility]. You are scheduled for the following service(s):    Date:  8/14/2020 Friday             Procedure:  COLONOSCOPY  Doctor:        Dr. Wilmer Porter   Arrival Time:  8:30 am *Enter and check in at the Main Hospital Entrance*  Procedure Time:  9:00 am      Location:   [facility]        Endoscopy Department, First Floor         [address] [place], [place] [address]      [phone] or [phone] ([facility]) to reschedule          MIRALAX -GATORADE  PREP  Colonoscopy is the most accurate test to detect colon polyps and colon cancer; and the only test where polyps can be removed. During this procedure, a doctor examines the lining of your large intestine and rectum through a flexible tube.   Transportation  You must arrange for a ride for the day of your procedure with a responsible adult. A taxi , Uber, etc, is not an option unless you are accompanied by a responsible adult. If you fail to arrange transportation with a responsible adult, your procedure will be cancelled and rescheduled.    Purchase the  following supplies at your local pharmacy:  - 2 (two) bisacodyl tablets: each tablet contains 5 mg.  (Dulcolax  laxative NOT Dulcolax  stool softener)   - 1 (one) 8.3 oz bottle of Polyethylene Glycol (PEG) 3350 Powder   (MiraLAX , Smooth LAX , ClearLAX  or equivalent)  - 64 oz Gatorade    Regular Gatorade, Gatorade G2 , Powerade , Powerade Zero  or Pedialyte  is acceptable. Red colored flavors are not allowed; all other colors (yellow, green, orange, purple and blue) are okay. It is also okay to buy two 2.12 oz packets of  powdered Gatorade that can be mixed with water to a total volume of 64 oz of liquid.  - 1 (one) 10 oz bottle of Magnesium Citrate (Red colored flavors are not allowed)  It is also okay for you to use a 0.5 oz package of powdered magnesium citrate (17 g) mixed with 10 oz of water.      PREPARATION FOR COLONOSCOPY    7 days before:    Discontinue fiber supplements and medications containing iron. This includes Metamucil  and Fibercon ; and multivitamins with iron.    3 days before:    Begin a low-fiber diet. A low-fiber diet helps making the cleanout more effective.     Examples of a low-fiber diet include (but are not limited to): white bread, white rice, pasta, crackers, fish, chicken, eggs, ground beef, creamy peanut butter, cooked/steamed/boiled vegetables, canned fruit, bananas, melons, milk, plain yogurt cheese, salad dressing and other condiments.     The following are not allowed on a low-fiber diet: seeds, nuts, popcorn, bran, whole wheat, corn, quinoa, raw fruits and vegetables, berries and dried fruit, beans and lentils.    For additional details on low-fiber diet, please refer to the table on the last page.    2 days before:    Continue the low-fiber diet.     Drink at least 8 glasses of water throughout the day.     Stop eating solid foods at 11:45 pm.  1 day before:    In the morning: begin a clear liquid diet (liquids you can see through).     Examples of a clear liquid diet include: water, clear broth or bouillon, Gatorade, Pedialyte or Powerade, carbonated and non-carbonated soft drinks (Sprite , 7-Up , ginger ale), strained fruit juices without pulp (apple, white grape, white cranberry), Jell-O  and popsicles.     The following are not allowed on a clear liquid diet: red liquids, alcoholic beverages, dairy products (milk, creamer, and yogurt), protein shakes, creamy broths, juice with pulp and chewing tobacco.    At noon: take 2 (two) bisacodyl tablets     At 4 (and no later than 6pm): start drinking  the Miralax-Gatorade preparation (8.3 oz of Miralax mixed with 64 oz of Gatorade in a large pitcher). Drink 1(one) 8 oz glass every 15 minutes thereafter, until the mixture is gone.    COLON CLEANSING TIPS: drink adequate amounts of fluids before and after your colon cleansing to prevent dehydration. Stay near a toilet because you will have diarrhea. Even if you are sitting on the toilet, continue to drink the cleansing solution every 15 minutes. If you feel nauseous or vomit, rinse your mouth with water, take a 15 to 30-minute-break and then continue drinking the solution. You will be uncomfortable until the stool has flushed from your colon (in about 2 to 4 hours). You may feel chilled.    Day of your procedure  You may take all of your morning medications including blood pressure medications, blood thinners (if you have not been instructed to stop these by our office), methadone, anti-seizure medications with sips of water 3 hours prior to your procedure or earlier. Do not take insulin or vitamins prior to your procedure. Continue the clear liquid diet.   4 hours prior: drink 10 oz of magnesium citrate. It may be easier to drink it with a straw.    STOP consuming all liquids after that.     Do not take anything by mouth during this time.     Allow extra time to travel to your procedure as you may need to stop and use a restroom along the way.    You are ready for the procedure, if you followed all instructions and your stool is no longer formed, but clear or yellow liquid. If you are unsure whether your colon is clean, please call our office at [phone] before you leave for your appointment.    Bring the following to your procedure:  - Insurance Card/Photo ID.   - List of current medications including over-the-counter medications and supplements.   - Your rescue inhaler if you currently use one to control asthma.    Canceling or rescheduling your appointment:   If you must cancel or reschedule your  appointment, please call [phone] as soon as possible.      COLONOSCOPY PRE-PROCEDURE CHECKLIST    If you have diabetes, ask your regular doctor for diet and medication restrictions.  If you take an anticoagulant or anti-platelet medication (such as Coumadin , Lovenox , Pradaxa , Xarelto , Eliquis , etc.), please call your primary doctor for advice on holding this medication.  If you take aspirin you may continue to do so.  If you are or may be pregnant, please discuss the risks and benefits of this procedure with your doctor.        What happens during a colonoscopy?    Plan to spend up to two hours, starting at registration time, at the endoscopy center the day of your procedure. The colonoscopy takes an average of 15 to 30 minutes. Recovery time is about 30 minutes.      Before the exam:    You will change into a gown.    Your medical history and medication list will be reviewed with you, unless that has been done over the phone prior to the procedure.     A nurse will insert an intravenous (IV) line into your hand or arm.    The doctor will meet with you and will give you a consent form to sign.  During the exam:     Medicine will be given through the IV line to help you relax.     Your heart rate and oxygen levels will be monitored. If your blood pressure is low, you may be given fluids through the IV line.     The doctor will insert a flexible hollow tube, called a colonoscope, into your rectum. The scope will be advanced slowly through the large intestine (colon).    You may have a feeling of fullness or pressure.     If an abnormal tissue or a polyp is found, the doctor may remove it through the endoscope for closer examination, or biopsy. Tissue removal is painless    After the exam:           Any tissue samples removed during the exam will be sent to a lab for evaluation. It may take 5-7 working days for you to be notified of the results.     A nurse will provide you with complete discharge  instructions before you leave the endoscopy center. Be sure to ask the nurse for specific instructions if you take blood thinners such as Aspirin, Coumadin or Plavix.     The doctor will prepare a full report for you and for the physician who referred you for the procedure.     Your doctor will talk with you about the initial results of your exam.      Medication given during the exam will prohibit you from driving for the rest of the day.     Following the exam, you may resume your normal diet. Your first meal should be light, no greasy foods. Avoid alcohol until the next day.     You may resume your regular activities the day after the procedure.         LOW-FIBER DIET    Foods RECOMMENDED Foods to AVOID   Breads, Cereal, Rice and Pasta:   White bread, rolls, biscuits, croissant and kristyn toast.   Waffles, Irish toast and pancakes.   White rice, noodles, pasta, macaroni and peeled cooked potatoes.   Plain crackers and saltines.   Cooked cereals: farina, cream of rice.   Cold cereals: Puffed Rice , Rice Krispies , Corn Flakes  and Special K    Breads, Cereal, Rice and Pasta:   Breads or rolls with nuts, seeds or fruit.   Whole wheat, pumpernickel, rye breads and cornbread.   Potatoes with skin, brown or wild rice, and kasha (buckwheat).     Vegetables:   Tender cooked and canned vegetables without seeds: carrots, asparagus tips, green or wax beans, pumpkin, spinach, lima beans. Vegetables:   Raw or steamed vegetables.   Vegetables with seeds.   Sauerkraut.   Winter squash, peas, broccoli, Brussel sprouts, cabbage, onions, cauliflower, baked beans, peas and corn.   Fruits:   Strained fruit juice.   Canned fruit, except pineapple.   Ripe bananas and melon. Fruits:   Prunes and prune juice.   Raw fruits.   Dried fruits: figs, dates and raisins.   Milk/Dairy:   Milk: plain or flavored.   Yogurt, custard and ice cream.   Cheese and cottage cheese Milk/Dairy:     Meat and other proteins:   ground, well-cooked tender  beef, lamb, ham, veal, pork, fish, poultry and organ meats.   Eggs.   Peanut butter without nuts. Meat and other proteins:   Tough, fibrous meats with gristle.   Dry beans, peas and lentils.   Peanut butter with nuts.   Tofu.   Fats, Snack, Sweets, Condiments and Beverages:   Margarine, butter, oils, mayonnaise, sour cream and salad dressing, plain gravy.   Sugar, hard candy, clear jelly, honey and syrup.   Spices, cooked herbs, bouillon, broth and soups made with allowed vegetable, ketchup and mustard.   Coffee, tea and carbonated drinks.   Plain cakes, cookies and pretzels.   Gelatin, plain puddings, custard, ice cream, sherbet and popsicles. Fats, Snack, Sweets, Condiments and Beverages:   Nuts, seeds and coconut.   Jam, marmalade and preserves.   Pickles, olives, relish and horseradish.   All desserts containing nuts, seeds, dried fruit and coconut; or made from whole grains or bran.   Candy made with nuts or seeds.   Popcorn.       DIRECTIONS TO THE ENDOSCOPY DEPARTMENT    From the north ([facility])  Take [address] South, exit on [address] [address]. Get into the left hand lisbeth, turn left (east), go one-half mile to [address] and turn left. Go north to the second stoplight, take a right on [address] and follow it to the Main Hospital entrance.    From the south ([facility])  Take 35N to the 35E split and exit on [address] [address]. On [address] [address], turn left (west) to [address]. Turn right (north) on [address]. Go north to the second stoplight, take a right on [address] and follow it to the Main Hospital entrance.    From the east via 35E ([facility])  Take 35E south to [address] [address] exit. Turn right on [address] [address]. Go west to [address]. Turn right (north) on [address]. Go to the second stoplight, take a right on [address] to the Main Hospital entrance.    From the east via [address] ([facility])  Take [facility] [address]  West to [address]. Turn left (south) on [address] to [address], turn left (east) on [address] and follow it to the Main Hospital entrance.    From the west via [address] ([place])  Take [address] east to [address]. Turn right (south) on [address] to [address], turn left (east) on [address] and follow it to the Main Hospital entrance.

## 2020-08-31 ENCOUNTER — THERAPY VISIT (OUTPATIENT)
Dept: PHYSICAL THERAPY | Facility: CLINIC | Age: 60
End: 2020-08-31
Payer: COMMERCIAL

## 2020-08-31 DIAGNOSIS — M75.101 ROTATOR CUFF SYNDROME OF RIGHT SHOULDER: ICD-10-CM

## 2020-08-31 PROCEDURE — 97110 THERAPEUTIC EXERCISES: CPT | Mod: GP | Performed by: PHYSICAL THERAPIST

## 2020-08-31 PROCEDURE — 97010 HOT OR COLD PACKS THERAPY: CPT | Mod: GP | Performed by: PHYSICAL THERAPIST

## 2020-09-01 DIAGNOSIS — M75.101 ROTATOR CUFF SYNDROME OF RIGHT SHOULDER: ICD-10-CM

## 2020-09-01 NOTE — TELEPHONE ENCOUNTER
Denied refill request as is too soon.   Last prescription dated 8/12/20 #30, sig: take 1 tab nightly prn for muscle spasms.     ALONA Harry RN

## 2020-09-02 ENCOUNTER — THERAPY VISIT (OUTPATIENT)
Dept: OCCUPATIONAL THERAPY | Facility: CLINIC | Age: 60
End: 2020-09-02
Payer: COMMERCIAL

## 2020-09-02 DIAGNOSIS — G56.01 CARPAL TUNNEL SYNDROME OF RIGHT WRIST: ICD-10-CM

## 2020-09-02 DIAGNOSIS — M79.641 PAIN OF RIGHT HAND: ICD-10-CM

## 2020-09-02 PROCEDURE — 97140 MANUAL THERAPY 1/> REGIONS: CPT | Mod: GO | Performed by: OCCUPATIONAL THERAPIST

## 2020-09-02 PROCEDURE — 97112 NEUROMUSCULAR REEDUCATION: CPT | Mod: GO | Performed by: OCCUPATIONAL THERAPIST

## 2020-09-09 ENCOUNTER — OFFICE VISIT (OUTPATIENT)
Dept: ORTHOPEDICS | Facility: CLINIC | Age: 60
End: 2020-09-09
Payer: COMMERCIAL

## 2020-09-09 VITALS
DIASTOLIC BLOOD PRESSURE: 60 MMHG | HEIGHT: 66 IN | BODY MASS INDEX: 31.34 KG/M2 | WEIGHT: 195 LBS | SYSTOLIC BLOOD PRESSURE: 118 MMHG

## 2020-09-09 DIAGNOSIS — M75.101 ROTATOR CUFF SYNDROME OF RIGHT SHOULDER: Primary | ICD-10-CM

## 2020-09-09 DIAGNOSIS — R20.0 NUMBNESS OF RIGHT HAND: ICD-10-CM

## 2020-09-09 PROCEDURE — 99213 OFFICE O/P EST LOW 20 MIN: CPT | Performed by: FAMILY MEDICINE

## 2020-09-09 ASSESSMENT — MIFFLIN-ST. JEOR: SCORE: 1466.26

## 2020-09-09 NOTE — LETTER
9/9/2020         RE: Selena Saenz  [address] [facility] [address]        Dear Colleague,    Thank you for referring your patient, Selena Saenz, to the [facility]. Please see a copy of my visit note below.    ASSESSMENT & PLAN  Patient Instructions     1. Rotator cuff syndrome of right shoulder    2. Numbness of right hand      -Patient is following up for right shoulder pain due to tendinitis and right hand numbness.  -Patient reports tingling from the head of the arm has resolved with physical therapy  -Patient will continue with hand therapy and home exercises.  If symptoms do not improve patient will follow-up with an orthopedic hand specialist for further work-up and treatment.  -We discussed the possible nerve test which she would like to avoid at this time.  -Patient will follow-up with Dr. Diana Ken at [facility].  She may call [phone].  -Call direct clinic number [[phone]] at any time with questions or concerns.    Albert Yeo MD [facility] and [facility] Medicine  [facility] [place]          -----    SUBJECTIVE:  Selena Saenz is a 59 year old female who is seen in follow-up for right shoulder pain due to tendinitis of rotator cuff muscles and numbness and tingling in the right hand likely due to carpal tunnel syndrome.They were last seen 8/12/2020.     Since their last visit reports 50% improvement. Feels like she is making improvements, doing the home exercises. States hand doesn't tingling any more, but does say that fingers are still numb. And only has pain when doing the home exercises. They indicate that their current pain level is 1/10. They have tried ice, Aleve, home exercises and physical therapy and hand therapy (5 visits).  Tried the tizanindine, but she didn't like it, so she stopped.     The patient is seen by themselves.    Patient's past medical, surgical, social, and family histories were reviewed  "today and no changes are noted.    REVIEW OF SYSTEMS:  Constitutional: NEGATIVE for fever, chills, change in weight  Skin: NEGATIVE for worrisome rashes, moles or lesions  GI/: NEGATIVE for bowel or bladder changes  Neuro: NEGATIVE for weakness, dizziness or paresthesias    OBJECTIVE:  /60   Ht 1.676 m (5' 6\")   Wt 88.5 kg (195 lb)   LMP 09/14/2013   BMI 31.47 kg/m     General: healthy, alert and in no distress  HEENT: no scleral icterus or conjunctival erythema  Skin: no suspicious lesions or rash. No jaundice.  CV: regular rhythm by palpation, no pedal edema  Resp: normal respiratory effort without conversational dyspnea   Psych: normal mood and affect  Gait: normal steady gait with appropriate coordination and balance  Neuro: decreased sensation between 2nd and 3rd digit   MSK:  RIGHT SHOULDER  Inspection:    no swelling, bruising, discoloration, or obvious deformity or asymmetry  Palpation:    Tender about the anterior capsule and supraspinatus insertion. Remainder of bony and tendinous landmarks are nontender.  Active Range of Motion:     Abduction 1650, FF normal0, ER normal0, IR normal.      Scapular dyskinesis absent  Strength:    Scapular plane abduction grossly intact and mildly painful,  ER grossly intact, IR grossly intact, biceps grossly intact, triceps grossly intact  Special Tests:    Positive: supraspinatus (empty can)    Negative: Neer's, Garcia', Speed's, crossed arm adduction and Holcomb's and Yergason's     CERVICAL SPINE  Inspection:    normal cervical lordosis present, rounded shoulders, forward head posture  Palpation:     landmarks are nontender.  Range of Motion:     Flexion within normal limits    Extension within normal limits    Right side bend within normal limits    Left side bend within normal limits    Right rotation within normal limits    Left rotation within normal limits  Strength:    Full strength throughout all neck muscles  Special Tests:    Positive: None    " Negative: Spurling's (bilateral), Jackson's (bilateral)     RIGHT WRIST  Inspection:    No swelling, bruising, discoloration, or obvious deformity or asymmetry  Palpation:    bony and ligamentous line marks are nontender.    Crepitus is Absent    Metacarpals: normal    Thumb: normal    Fingers: normal  Range of Motion:    Full (active and passive) flexion, extension, pronation/supination, and ulnar/radial deviation.  Strength:    No deficits in flexion, extension, ulnar/radial deviation, or  strength.  Special Tests:    Positive: Phalen's    Negative: Tinel's (carpal tunnel), Finkelstein's, thenar eminence wasting, hypothenar eminence wasting    Independent visualization of the below image:    Patient's conditions were thoroughly discussed during today's visit with greater than 50% of the visit spent counseling the patient with total time spent face-to-face with the patient being 19 minutes.    Albert Yeo MD, [facility]          Again, thank you for allowing me to participate in the care of your patient.        Sincerely,        Albert Yeo, MD

## 2020-09-09 NOTE — PATIENT INSTRUCTIONS
1. Rotator cuff syndrome of right shoulder    2. Numbness of right hand      -Patient is following up for right shoulder pain due to tendinitis and right hand numbness.  -Patient reports tingling from the head of the arm has resolved with physical therapy  -Patient will continue with hand therapy and home exercises.  If symptoms do not improve patient will follow-up with an orthopedic hand specialist for further work-up and treatment.  -We discussed the possible nerve test which she would like to avoid at this time.  -Patient will follow-up with Dr. Diana Ken at [facility].  She may call [phone].  -Call direct clinic number [[phone]] at any time with questions or concerns.    Albert Yeo MD [facility] and [facility] Medicine  [facility] [place]

## 2020-09-09 NOTE — PROGRESS NOTES
ASSESSMENT & PLAN  Patient Instructions     1. Rotator cuff syndrome of right shoulder    2. Numbness of right hand      -Patient is following up for right shoulder pain due to tendinitis and right hand numbness.  -Patient reports tingling from the head of the arm has resolved with physical therapy  -Patient will continue with hand therapy and home exercises.  If symptoms do not improve patient will follow-up with an orthopedic hand specialist for further work-up and treatment.  -We discussed the possible nerve test which she would like to avoid at this time.  -Patient will follow-up with Dr. Diana Ken at [facility].  She may call [phone].  -Call direct clinic number [[phone]] at any time with questions or concerns.    Albert Yeo MD [facility] and Sports Medicine  [facility]          -----    SUBJECTIVE:  Selena Saenz is a 59 year old female who is seen in follow-up for right shoulder pain due to tendinitis of rotator cuff muscles and numbness and tingling in the right hand likely due to carpal tunnel syndrome.They were last seen 8/12/2020.     Since their last visit reports 50% improvement. Feels like she is making improvements, doing the home exercises. States hand doesn't tingling any more, but does say that fingers are still numb. And only has pain when doing the home exercises. They indicate that their current pain level is 1/10. They have tried ice, Aleve, home exercises and physical therapy and hand therapy (5 visits).  Tried the tizanindine, but she didn't like it, so she stopped.     The patient is seen by themselves.    Patient's past medical, surgical, social, and family histories were reviewed today and no changes are noted.    REVIEW OF SYSTEMS:  Constitutional: NEGATIVE for fever, chills, change in weight  Skin: NEGATIVE for worrisome rashes, moles or lesions  GI/: NEGATIVE for bowel or bladder changes  Neuro: NEGATIVE for weakness,  "dizziness or paresthesias    OBJECTIVE:  /60   Ht 1.676 m (5' 6\")   Wt 88.5 kg (195 lb)   LMP 09/14/2013   BMI 31.47 kg/m     General: healthy, alert and in no distress  HEENT: no scleral icterus or conjunctival erythema  Skin: no suspicious lesions or rash. No jaundice.  CV: regular rhythm by palpation, no pedal edema  Resp: normal respiratory effort without conversational dyspnea   Psych: normal mood and affect  Gait: normal steady gait with appropriate coordination and balance  Neuro: decreased sensation between 2nd and 3rd digit   MSK:  RIGHT SHOULDER  Inspection:    no swelling, bruising, discoloration, or obvious deformity or asymmetry  Palpation:    Tender about the anterior capsule and supraspinatus insertion. Remainder of bony and tendinous landmarks are nontender.  Active Range of Motion:     Abduction 1650, FF normal0, ER normal0, IR normal.      Scapular dyskinesis absent  Strength:    Scapular plane abduction grossly intact and mildly painful,  ER grossly intact, IR grossly intact, biceps grossly intact, triceps grossly intact  Special Tests:    Positive: supraspinatus (empty can)    Negative: Neer's, Garcia', Speed's, crossed arm adduction and [place]'s and Yergason's     CERVICAL SPINE  Inspection:    normal cervical lordosis present, rounded shoulders, forward head posture  Palpation:     landmarks are nontender.  Range of Motion:     Flexion within normal limits    Extension within normal limits    Right side bend within normal limits    Left side bend within normal limits    Right rotation within normal limits    Left rotation within normal limits  Strength:    Full strength throughout all neck muscles  Special Tests:    Positive: None    Negative: Spurling's (bilateral), Jackson's (bilateral)     RIGHT WRIST  Inspection:    No swelling, bruising, discoloration, or obvious deformity or asymmetry  Palpation:    bony and ligamentous line marks are nontender.    Crepitus is Absent    " Metacarpals: normal    Thumb: normal    Fingers: normal  Range of Motion:    Full (active and passive) flexion, extension, pronation/supination, and ulnar/radial deviation.  Strength:    No deficits in flexion, extension, ulnar/radial deviation, or  strength.  Special Tests:    Positive: Phalen's    Negative: Tinel's (carpal tunnel), Finkelstein's, thenar eminence wasting, hypothenar eminence wasting    Independent visualization of the below image:    Patient's conditions were thoroughly discussed during today's visit with greater than 50% of the visit spent counseling the patient with total time spent face-to-face with the patient being 19 minutes.    Albert Yeo MD, [facility]

## 2020-09-11 ENCOUNTER — THERAPY VISIT (OUTPATIENT)
Dept: OCCUPATIONAL THERAPY | Facility: CLINIC | Age: 60
End: 2020-09-11
Payer: COMMERCIAL

## 2020-09-11 DIAGNOSIS — G56.01 CARPAL TUNNEL SYNDROME OF RIGHT WRIST: ICD-10-CM

## 2020-09-11 DIAGNOSIS — M79.641 PAIN OF RIGHT HAND: ICD-10-CM

## 2020-09-11 PROCEDURE — 97140 MANUAL THERAPY 1/> REGIONS: CPT | Mod: GO | Performed by: OCCUPATIONAL THERAPIST

## 2020-09-11 PROCEDURE — 97112 NEUROMUSCULAR REEDUCATION: CPT | Mod: GO | Performed by: OCCUPATIONAL THERAPIST

## 2020-09-18 ENCOUNTER — THERAPY VISIT (OUTPATIENT)
Dept: OCCUPATIONAL THERAPY | Facility: CLINIC | Age: 60
End: 2020-09-18
Payer: COMMERCIAL

## 2020-09-18 DIAGNOSIS — G56.01 CARPAL TUNNEL SYNDROME OF RIGHT WRIST: ICD-10-CM

## 2020-09-18 DIAGNOSIS — M79.641 PAIN OF RIGHT HAND: ICD-10-CM

## 2020-09-18 PROCEDURE — 97112 NEUROMUSCULAR REEDUCATION: CPT | Mod: GO | Performed by: OCCUPATIONAL THERAPIST

## 2020-09-18 PROCEDURE — 97110 THERAPEUTIC EXERCISES: CPT | Mod: GO | Performed by: OCCUPATIONAL THERAPIST

## 2020-09-18 NOTE — PROGRESS NOTES
Hand Therapy Progress Note  Current Date: 9/18/2020  Reporting Period: 8/20/2020 to 9/18/2020    Subjective:  Selena Saenz is a 59 year old R hand dominant female.    Diagnosis: R Carpal Tunnel Syndrome  DOI:  8/1/2020    S:  Subjective changes as noted by patient: Things have been going well. I do still have the one numbness spot sporadically on the ulnar side of the R index finger. It has improved though. I find that when I do the nerve glide exercise I am able to move farther and have less tension through the arm. I don't have much pain in the forearm anymore, it is mainly in the upper arm.  Functional changes noted by patient: No changes  Response to previous treatment:  Good. Less numbness felt in the hand.  Patient has noted adverse reaction to: None      Objective:  Pain Level (Scale 0-10):   8/20/2020 9/18/2020   At Rest 0 0   With Use 5-6 2     Pain Description:  Date 8/20/2020 9/18/2020   Location R Dorsal forearm, hand, index, long fingers. Pt reports initial pain beginning in the R shoulder. R upper arm (middle of the lateral humerus to the shoulder)   Pain Quality Burning Dull   Frequency intermittent   intermiitent   Pain is worst  daytime or nighttime Nighttime > daytime   Exacerbated by bumping the forearm or hand on an object bumping the forearm or hand on an object (pain is less with this than previously)   Relieved by otc medications HEP, ball massage on the back of the arm and shoulder, Aleve in evening   Progression Gradually worsening Gradually improving     Posture  Forward Neck Posture, Rounded Forward Shoulders, Shoulder Height Difference and Patient's proximal musculature at shoulder appears imbalanced with tight pectoralis muscles, subscapularis, upper trapezius, latissimus and weak scapular stabilizers.  This pattern contributes to overuse of distal musculature.    Edema  None    Sensation  Decreased Dorsal Radial Sensory Nerve distribution per pt report. 9/18/20: Numbness in the  forearm and hand has significantly decreased. Pt reports primary numbness on ulnar side of R index finger (DRSN).    ROM  Pain Report: 0-10/10  Wrist 8/20/2020 8/20/2020 9/18/2020   AROM (PROM) L R R   Extension WNL WNL WNL   Flexion WNL WNL WNL   RD WNL WNL WNL   UD WNL WNL WNL     Palpation Radial Nerve Path   Pain level report on scale 0-10/10  Date 8/20/2020 9/182020    R R   Supraspinatus 5-6/10 3/10   Triangular Interval 5/10 3/10   Spiral Groove 7/10 3/10   Radial Head 0/10 0/10   PIN 4-5/10 2/10   DSRN 0/10 0/10     AROM Multi-joint  Date 8/20/2020 8/20/2020 9/18/2020    L R R   Elbow - FA - Wrist      90 - Neut - Flex 50 70 WNL   90 -  Pro  - Flex 62 60 WNL   Ext - Neut - Flex 55 60 WNL   Ext -  Pro  - Flex 60 60 WNL     Special Tests  Date 8/20/2020 9/18/2020   Side R R   Resisted Long Finger Test 0, tingling present 0   Tinels at DRSN + -   PIN Site Compression + at 5 sec -   Radial Nerve ULTT NT NT   Paresthesias NT NT     Neural Tension Testing  RNT: Radial Neurodynamic Test (based on DS Taylor's ULNT)   8/20/2020 9/18/2020   0-5 Scale 3/5 3/5   Position:   0/5: Arm across abdomen in coronal plane  1/5: Depress shoulder, ER to neutral ABD shoulder to 45 degrees  2/5: IR shoulder to end range, keep elbow at 90 degrees  3/5: Extend elbow to 0 degrees  4/5: Fully pronate forearm  5/5: Flex wrist and fingers with UD  Notes:    (+) indicates beyond grade level but less than senior care to next level    (-) indicates over senior care to level    S1  onset/change of patient's symptoms    S2 definite stop point based on patient's discomfort level    Strength   (Measured in pounds)  Pain Report: 0-10/10   8/20/2020 8/20/2020 8/20/2020 8/20/2020   Trials L R L R   1  2  3 54 59 55 61   Average 54 59 55 61     Lat Pinch 8/20/202020/2020 8/20/2020 8/20/2020 8/20/2020   Trials L R L R   1  2  3 17 17 17 17   Average 17 17 17 17     3 Pt Pinch 8/20/2020 8/20/2020 8/20/2020 8/20/2020   Trials L R L R   1  2  3 14 16 15 16    Average 14 16 15 16     Assessment:  Response to therapy has been improvement to:  ROM of Wrist, Strength: , Pain:  frequency is less, intensity of pain is decreased and less tender over affected area  Paresthesias:  Radial nerve - less intense numbness and tingling and smaller area of involvement    Overall Assessment:  Patient's symptoms are resolving.  Patient is ready to progress to next level of protocol.  Patient is becoming more independent in home exercise program  Patient would benefit from continued therapy to achieve rehab potential  STG/LTG:  STGoals have been reviewed;  see goal sheet for details and updates.  LTGoals have been reviewed;  see goal sheet for details and updates.    I have re-evaluated this patient and find that the nature, scope, duration and intensity of the therapy is appropriate for the medical condition of the patient.    Plan:  Frequency:  1 X week, once daily  Duration:  for 3 weeks     Treatment Plan:    Modalities:    None   Therapeutic Exercise:    AROM, AAROM, PROM, Tendon Gliding, Blocking, Isotonics and Isometrics  Neuromuscular re-ed:              Nerve Gliding, Posture and Kinesiotaping  Manual Techniques:              Myofascial release  Orthotic Fabrication:    Forearm based orthosis  Self Care:    Ergonomic Considerations and Work Tasks     Discharge Plan:  Achieve all LTG.  Independent in home treatment program.  Reach maximal therapeutic benefit.     Home Exercise Program:  Ball Massage   EMR Notes   HEP -  Sets 1   Reps   Sessions per day 1-2x/day   Notes 5-10 minutes   Wrist Passive Range of Motion Flexion   EMR Notes   HEP -  Sets 1   Reps 3   Sessions per day 2x/day   Notes 20-30 seconds holds   Nerve Gliding Proximal Radial   EMR Notes   HEP -  Sets 1   Reps 10   Sessions per day 1-2x/day   Notes Hold for 5 seconds      Next Visit:  MFR  Distal Radial Nerve Glides  Review and Progress HEP PRN  Discuss ergonomic workspace: split ergonomic keyboard

## 2020-09-21 ENCOUNTER — THERAPY VISIT (OUTPATIENT)
Dept: PHYSICAL THERAPY | Facility: CLINIC | Age: 60
End: 2020-09-21
Payer: COMMERCIAL

## 2020-09-21 DIAGNOSIS — M75.101 ROTATOR CUFF SYNDROME OF RIGHT SHOULDER: ICD-10-CM

## 2020-09-21 PROCEDURE — 97110 THERAPEUTIC EXERCISES: CPT | Mod: GP | Performed by: PHYSICAL THERAPIST

## 2020-09-21 PROCEDURE — 97140 MANUAL THERAPY 1/> REGIONS: CPT | Mod: GP | Performed by: PHYSICAL THERAPIST

## 2020-09-25 ENCOUNTER — THERAPY VISIT (OUTPATIENT)
Dept: OCCUPATIONAL THERAPY | Facility: CLINIC | Age: 60
End: 2020-09-25
Payer: COMMERCIAL

## 2020-09-25 DIAGNOSIS — M79.641 PAIN OF RIGHT HAND: ICD-10-CM

## 2020-09-25 DIAGNOSIS — G56.01 CARPAL TUNNEL SYNDROME OF RIGHT WRIST: ICD-10-CM

## 2020-09-25 PROCEDURE — 97140 MANUAL THERAPY 1/> REGIONS: CPT | Mod: GO | Performed by: OCCUPATIONAL THERAPIST

## 2020-09-25 PROCEDURE — 97112 NEUROMUSCULAR REEDUCATION: CPT | Mod: GO | Performed by: OCCUPATIONAL THERAPIST

## 2020-10-02 ENCOUNTER — THERAPY VISIT (OUTPATIENT)
Dept: OCCUPATIONAL THERAPY | Facility: CLINIC | Age: 60
End: 2020-10-02
Payer: COMMERCIAL

## 2020-10-02 ENCOUNTER — THERAPY VISIT (OUTPATIENT)
Dept: PHYSICAL THERAPY | Facility: CLINIC | Age: 60
End: 2020-10-02
Payer: COMMERCIAL

## 2020-10-02 DIAGNOSIS — M79.641 PAIN OF RIGHT HAND: ICD-10-CM

## 2020-10-02 DIAGNOSIS — G56.01 CARPAL TUNNEL SYNDROME OF RIGHT WRIST: ICD-10-CM

## 2020-10-02 DIAGNOSIS — M75.101 ROTATOR CUFF SYNDROME OF RIGHT SHOULDER: ICD-10-CM

## 2020-10-02 PROCEDURE — 97110 THERAPEUTIC EXERCISES: CPT | Mod: GP | Performed by: PHYSICAL THERAPIST

## 2020-10-02 PROCEDURE — 97140 MANUAL THERAPY 1/> REGIONS: CPT | Mod: GO | Performed by: OCCUPATIONAL THERAPIST

## 2020-10-02 PROCEDURE — 97112 NEUROMUSCULAR REEDUCATION: CPT | Mod: GO | Performed by: OCCUPATIONAL THERAPIST

## 2020-10-02 PROCEDURE — 97140 MANUAL THERAPY 1/> REGIONS: CPT | Mod: GP | Performed by: PHYSICAL THERAPIST

## 2020-10-02 NOTE — PROGRESS NOTES
Discharge Note  Current Date: 10/2/2020    Pt will continue to complete her HEP and progress toward her goals independently. Discharge Pt from [facility].

## 2020-10-02 NOTE — LETTER
[facility]  [address] [address] [address] [phone]    2020    Re: Selena Saenz   :   1960  MRN:  [id number]   REFERRING PHYSICIAN:   Albert Yeo    [facility]    Date of Initial Evaluation:  10/2/2020  Visits:  Rxs Used: 5  Reason for Referral:  Rotator cuff syndrome of right shoulder    DISCHARGE REPORT  Progress reporting period is from 20 to 10/2/20.       SUBJECTIVE  Subjective changes noted by patient:  Selena reports that her right shoulder is doing much better.  It still hurts when lying on her right side too long, but nowhere near as much as it used to.  She can now reach overhead painfree with a click in the shoulder at times.       Current pain level is 0/10  .     Previous pain level was   Initial Pain level: 3/10.   Changes in function:  Yes (See Goal flowsheet attached for changes in current functional level)  Adverse reaction to treatment or activity: None  OBJECTIVE  Changes noted in objective findings:  Full painfree AROM with clicking in abduction.  Slight tightness in passive ER and IR.  Strength testing is strong and painfree throughout.  ASSESSMENT/PLAN  Updated problem list and treatment plan: Diagnosis 1:  Right shoulder impingement    STG/LTGs have been met or progress has been made towards goals:  Yes (See Goal flow sheet completed today.)  Assessment of Progress: The patient's condition is improving.  Self Management Plans:  Patient has been instructed in a home treatment program.  Selena continues to require the following intervention to meet STG and LTG's:  PT intervention is no longer required to meet STG/LTG.  Recommendations:  This patient is ready to be discharged from therapy and continue their home treatment program.    Thank you for your referral.    INQUIRIES  Therapist: Josué Dyson PT  [facility]  [address] [address] [address]  Phone:  [phone]  Fax: [phone]

## 2020-10-02 NOTE — PROGRESS NOTES
Subjective:      Physical Exam                    Objective:  System    Physical Exam    General     ROS    Assessment/Plan:    DISCHARGE REPORT    Progress reporting period is from 8/21/20 to 10/2/20.       SUBJECTIVE  Subjective changes noted by patient:  Selena reports that her right shoulder is doing much better.  It still hurts when lying on her right side too long, but nowhere near as much as it used to.  She can now reach overhead painfree with a click in the shoulder at times.       Current pain level is 0/10  .     Previous pain level was   Initial Pain level: 3/10.   Changes in function:  Yes (See Goal flowsheet attached for changes in current functional level)  Adverse reaction to treatment or activity: None    OBJECTIVE  Changes noted in objective findings:  Full painfree AROM with clicking in abduction.  Slight tightness in passive ER and IR.  Strength testing is strong and painfree throughout.        ASSESSMENT/PLAN  Updated problem list and treatment plan: Diagnosis 1:  Right shoulder impingement    STG/LTGs have been met or progress has been made towards goals:  Yes (See Goal flow sheet completed today.)  Assessment of Progress: The patient's condition is improving.  Self Management Plans:  Patient has been instructed in a home treatment program.    Selena continues to require the following intervention to meet STG and LTG's:  PT intervention is no longer required to meet STG/LTG.    Recommendations:  This patient is ready to be discharged from therapy and continue their home treatment program.    Please refer to the daily flowsheet for treatment today, total treatment time and time spent performing 1:1 timed codes.

## 2021-03-31 ENCOUNTER — IMMUNIZATION (OUTPATIENT)
Dept: NURSING | Facility: CLINIC | Age: 61
End: 2021-03-31
Payer: COMMERCIAL

## 2021-03-31 PROCEDURE — 0001A PR COVID VAC PFIZER DIL RECON 30 MCG/0.3 ML IM: CPT

## 2021-03-31 PROCEDURE — 91300 PR COVID VAC PFIZER DIL RECON 30 MCG/0.3 ML IM: CPT

## 2021-04-21 ENCOUNTER — IMMUNIZATION (OUTPATIENT)
Dept: NURSING | Facility: CLINIC | Age: 61
End: 2021-04-21
Attending: INTERNAL MEDICINE
Payer: COMMERCIAL

## 2021-04-21 PROCEDURE — 91300 PR COVID VAC PFIZER DIL RECON 30 MCG/0.3 ML IM: CPT

## 2021-04-21 PROCEDURE — 0002A PR COVID VAC PFIZER DIL RECON 30 MCG/0.3 ML IM: CPT

## 2021-04-28 DIAGNOSIS — E03.9 HYPOTHYROIDISM, UNSPECIFIED TYPE: ICD-10-CM

## 2021-04-28 RX ORDER — LEVOTHYROXINE SODIUM 75 UG/1
TABLET ORAL
Qty: 90 TABLET | Refills: 3 | OUTPATIENT
Start: 2021-04-28

## 2021-04-28 NOTE — TELEPHONE ENCOUNTER
Patient should have available refills. Will route note to pharmacy to dispense available refills.Traci Donald R.N.

## 2021-07-13 ASSESSMENT — ENCOUNTER SYMPTOMS
NERVOUS/ANXIOUS: 0
SORE THROAT: 0
EYE PAIN: 0
CONSTIPATION: 0
PARESTHESIAS: 0
JOINT SWELLING: 0
FREQUENCY: 0
CHILLS: 0
MYALGIAS: 0
ABDOMINAL PAIN: 0
BREAST MASS: 0
DIARRHEA: 0
HEARTBURN: 0
WEAKNESS: 0
FEVER: 0
ARTHRALGIAS: 0
DYSURIA: 0
DIZZINESS: 0
COUGH: 0
SHORTNESS OF BREATH: 0
PALPITATIONS: 0
HEMATOCHEZIA: 0
HEADACHES: 0
HEMATURIA: 0
NAUSEA: 0

## 2021-07-15 ENCOUNTER — OFFICE VISIT (OUTPATIENT)
Dept: FAMILY MEDICINE | Facility: CLINIC | Age: 61
End: 2021-07-15
Payer: COMMERCIAL

## 2021-07-15 VITALS
SYSTOLIC BLOOD PRESSURE: 120 MMHG | HEIGHT: 66 IN | RESPIRATION RATE: 16 BRPM | OXYGEN SATURATION: 100 % | TEMPERATURE: 98.6 F | DIASTOLIC BLOOD PRESSURE: 72 MMHG | BODY MASS INDEX: 32.48 KG/M2 | WEIGHT: 202.1 LBS | HEART RATE: 72 BPM

## 2021-07-15 DIAGNOSIS — Z00.00 ROUTINE GENERAL MEDICAL EXAMINATION AT A HEALTH CARE FACILITY: Primary | ICD-10-CM

## 2021-07-15 DIAGNOSIS — N18.31 STAGE 3A CHRONIC KIDNEY DISEASE (H): ICD-10-CM

## 2021-07-15 DIAGNOSIS — E78.5 HYPERLIPIDEMIA LDL GOAL <130: ICD-10-CM

## 2021-07-15 DIAGNOSIS — H04.123 DRY EYES: ICD-10-CM

## 2021-07-15 DIAGNOSIS — Z12.31 VISIT FOR SCREENING MAMMOGRAM: ICD-10-CM

## 2021-07-15 DIAGNOSIS — E03.9 HYPOTHYROIDISM, UNSPECIFIED TYPE: ICD-10-CM

## 2021-07-15 DIAGNOSIS — Z13.0 SCREENING FOR BLOOD DISEASE: ICD-10-CM

## 2021-07-15 DIAGNOSIS — M25.50 MULTIPLE JOINT PAIN: ICD-10-CM

## 2021-07-15 LAB
ALBUMIN SERPL-MCNC: 3.8 G/DL (ref 3.4–5)
ALP SERPL-CCNC: 98 U/L (ref 40–150)
ALT SERPL W P-5'-P-CCNC: 76 U/L (ref 0–50)
ANION GAP SERPL CALCULATED.3IONS-SCNC: 7 MMOL/L (ref 3–14)
AST SERPL W P-5'-P-CCNC: 43 U/L (ref 0–45)
BILIRUB SERPL-MCNC: 0.8 MG/DL (ref 0.2–1.3)
BUN SERPL-MCNC: 17 MG/DL (ref 7–30)
CALCIUM SERPL-MCNC: 10 MG/DL (ref 8.5–10.1)
CHLORIDE BLD-SCNC: 106 MMOL/L (ref 94–109)
CHOLEST SERPL-MCNC: 192 MG/DL
CO2 SERPL-SCNC: 25 MMOL/L (ref 20–32)
CREAT SERPL-MCNC: 0.95 MG/DL (ref 0.52–1.04)
CRP SERPL-MCNC: 5.5 MG/L (ref 0–8)
ERYTHROCYTE [DISTWIDTH] IN BLOOD BY AUTOMATED COUNT: 13.9 % (ref 10–15)
FASTING STATUS PATIENT QL REPORTED: YES
GFR SERPL CREATININE-BSD FRML MDRD: 64 ML/MIN/1.73M2
GLUCOSE BLD-MCNC: 99 MG/DL (ref 70–99)
HCT VFR BLD AUTO: 42.7 % (ref 35–47)
HDLC SERPL-MCNC: 65 MG/DL
HGB BLD-MCNC: 13.9 G/DL (ref 11.7–15.7)
LDLC SERPL CALC-MCNC: 108 MG/DL
MCH RBC QN AUTO: 27.6 PG (ref 26.5–33)
MCHC RBC AUTO-ENTMCNC: 32.6 G/DL (ref 31.5–36.5)
MCV RBC AUTO: 85 FL (ref 78–100)
NONHDLC SERPL-MCNC: 127 MG/DL
PLATELET # BLD AUTO: 253 10E3/UL (ref 150–450)
POTASSIUM BLD-SCNC: 4.2 MMOL/L (ref 3.4–5.3)
PROT SERPL-MCNC: 7.8 G/DL (ref 6.8–8.8)
RBC # BLD AUTO: 5.03 10E6/UL (ref 3.8–5.2)
SODIUM SERPL-SCNC: 138 MMOL/L (ref 133–144)
TRIGL SERPL-MCNC: 94 MG/DL
TSH SERPL DL<=0.005 MIU/L-ACNC: 1.66 MU/L (ref 0.4–4)
WBC # BLD AUTO: 6.4 10E3/UL (ref 4–11)

## 2021-07-15 PROCEDURE — 86140 C-REACTIVE PROTEIN: CPT | Performed by: PHYSICIAN ASSISTANT

## 2021-07-15 PROCEDURE — 80061 LIPID PANEL: CPT | Performed by: PHYSICIAN ASSISTANT

## 2021-07-15 PROCEDURE — 86431 RHEUMATOID FACTOR QUANT: CPT | Performed by: PHYSICIAN ASSISTANT

## 2021-07-15 PROCEDURE — 86038 ANTINUCLEAR ANTIBODIES: CPT | Performed by: PHYSICIAN ASSISTANT

## 2021-07-15 PROCEDURE — 85027 COMPLETE CBC AUTOMATED: CPT | Performed by: PHYSICIAN ASSISTANT

## 2021-07-15 PROCEDURE — 86235 NUCLEAR ANTIGEN ANTIBODY: CPT | Performed by: PHYSICIAN ASSISTANT

## 2021-07-15 PROCEDURE — 80053 COMPREHEN METABOLIC PANEL: CPT | Performed by: PHYSICIAN ASSISTANT

## 2021-07-15 PROCEDURE — 99213 OFFICE O/P EST LOW 20 MIN: CPT | Mod: 25 | Performed by: PHYSICIAN ASSISTANT

## 2021-07-15 PROCEDURE — 86235 NUCLEAR ANTIGEN ANTIBODY: CPT | Mod: 59 | Performed by: PHYSICIAN ASSISTANT

## 2021-07-15 PROCEDURE — 84443 ASSAY THYROID STIM HORMONE: CPT | Performed by: PHYSICIAN ASSISTANT

## 2021-07-15 PROCEDURE — 36415 COLL VENOUS BLD VENIPUNCTURE: CPT | Performed by: PHYSICIAN ASSISTANT

## 2021-07-15 PROCEDURE — 99396 PREV VISIT EST AGE 40-64: CPT | Performed by: PHYSICIAN ASSISTANT

## 2021-07-15 RX ORDER — ATORVASTATIN CALCIUM 10 MG/1
5 TABLET, FILM COATED ORAL DAILY
Qty: 45 TABLET | Refills: 3 | Status: SHIPPED | OUTPATIENT
Start: 2021-07-15 | End: 2022-07-06

## 2021-07-15 RX ORDER — LEVOTHYROXINE SODIUM 75 UG/1
TABLET ORAL
Qty: 90 TABLET | Refills: 3 | Status: SHIPPED | OUTPATIENT
Start: 2021-07-15 | End: 2022-07-06

## 2021-07-15 ASSESSMENT — ENCOUNTER SYMPTOMS
DIZZINESS: 0
HEARTBURN: 0
JOINT SWELLING: 0
FREQUENCY: 0
SORE THROAT: 0
NAUSEA: 0
COUGH: 0
MYALGIAS: 0
BREAST MASS: 0
ABDOMINAL PAIN: 0
PALPITATIONS: 0
ARTHRALGIAS: 0
NERVOUS/ANXIOUS: 0
HEADACHES: 0
CHILLS: 0
HEMATOCHEZIA: 0
CONSTIPATION: 0
HEMATURIA: 0
WEAKNESS: 0
PARESTHESIAS: 0
DIARRHEA: 0
FEVER: 0
SHORTNESS OF BREATH: 0
EYE PAIN: 0
DYSURIA: 0

## 2021-07-15 ASSESSMENT — MIFFLIN-ST. JEOR: SCORE: 1485.53

## 2021-07-15 NOTE — PROGRESS NOTES
SUBJECTIVE:   CC: Selena Saenz is an 60 year old woman who presents for preventive health visit.     Patient has been advised of split billing requirements and indicates understanding: Yes  Healthy Habits:     Getting at least 3 servings of Calcium per day:  Yes    Bi-annual eye exam:  Yes    Dental care twice a year:  NO    Sleep apnea or symptoms of sleep apnea:  None    Diet:  Regular (no restrictions)    Frequency of exercise:  None    Taking medications regularly:  Yes    Medication side effects:  None    PHQ-2 Total Score: 0    Additional concerns today:  No          Today's PHQ-2 Score:   PHQ-2 ( 1999 Pfizer) 7/13/2021   Q1: Little interest or pleasure in doing things 0   Q2: Feeling down, depressed or hopeless 0   PHQ-2 Score 0   Q1: Little interest or pleasure in doing things Not at all   Q2: Feeling down, depressed or hopeless Not at all   PHQ-2 Score 0       Abuse: Current or Past (Physical, Sexual or Emotional) - No  Do you feel safe in your environment? Yes        Social History     Tobacco Use     Smoking status: Never Smoker     Smokeless tobacco: Never Used   Substance Use Topics     Alcohol use: Yes     Comment: 2-3 glasses of wine weekly         Alcohol Use 7/13/2021   Prescreen: >3 drinks/day or >7 drinks/week? No   Prescreen: >3 drinks/day or >7 drinks/week? -       Reviewed orders with patient.  Reviewed health maintenance and updated orders accordingly - Yes      Breast Cancer Screening:  Any new diagnosis of family breast, ovarian, or bowel cancer? No    FHS-7: No flowsheet data found.  click delete button to remove this line now  Mammogram Screening: Recommended mammography every 1-2 years with patient discussion and risk factor consideration  Pertinent mammograms are reviewed under the imaging tab.    History of abnormal Pap smear: NO - age 30-65 PAP every 5 years with negative HPV co-testing recommended  PAP / HPV Latest Ref Rng & Units 7/8/2020 2/8/2016 2/13/2013   PAP - NIL NIL NIL    HPV 16 DNA NEG:Negative Negative Negative -   HPV 18 DNA NEG:Negative Negative Negative -   OTHER HR HPV NEG:Negative Negative Negative -     Reviewed and updated as needed this visit by clinical staff  Tobacco  Allergies  Meds   Med Hx  Surg Hx  Fam Hx  Soc Hx        Reviewed and updated as needed this visit by Provider                     Review of Systems   Constitutional: Negative for chills and fever.   HENT: Negative for congestion, ear pain, hearing loss and sore throat.    Eyes: Negative for pain and visual disturbance.   Respiratory: Negative for cough and shortness of breath.    Cardiovascular: Negative for chest pain, palpitations and peripheral edema.   Gastrointestinal: Negative for abdominal pain, constipation, diarrhea, heartburn, hematochezia and nausea.   Breasts:  Negative for tenderness, breast mass and discharge.   Genitourinary: Negative for dysuria, frequency, genital sores, hematuria, pelvic pain, urgency, vaginal bleeding and vaginal discharge.   Musculoskeletal: Negative for arthralgias, joint swelling and myalgias.   Skin: Negative for rash.   Neurological: Negative for dizziness, weakness, headaches and paresthesias.   Psychiatric/Behavioral: Negative for mood changes. The patient is not nervous/anxious.           OBJECTIVE:   LMP 09/14/2013   Physical Exam  GENERAL APPEARANCE: healthy, alert and no distress  EYES: Eyes grossly normal to inspection, PERRL and conjunctivae and sclerae normal  HENT: ear canals and TM's normal, nose and mouth without ulcers or lesions, oropharynx clear and oral mucous membranes moist  NECK: no adenopathy, no asymmetry, masses, or scars and thyroid normal to palpation  RESP: lungs clear to auscultation - no rales, rhonchi or wheezes  BREAST: normal without masses, tenderness or nipple discharge and no palpable axillary masses or adenopathy  CV: regular rate and rhythm, normal S1 S2, no S3 or S4, no murmur, click or rub, no peripheral edema and  peripheral pulses strong  ABDOMEN: soft, nontender, no hepatosplenomegaly, no masses and bowel sounds normal  MS: no musculoskeletal defects are noted and gait is age appropriate without ataxia  SKIN: no suspicious lesions or rashes  NEURO: Normal strength and tone, sensory exam grossly normal, mentation intact and speech normal  PSYCH: mentation appears normal and affect normal/bright    Diagnostic Test Results:  Labs reviewed in Epic    ASSESSMENT/PLAN:   1. Routine general medical examination at a health care facility      2. Hypothyroidism, unspecified type  Stable symptoms  - TSH WITH FREE T4 REFLEX  - levothyroxine (SYNTHROID/LEVOTHROID) 75 MCG tablet; TAKE 1 TABLET BY MOUTH EVERY DAY  Dispense: 90 tablet; Refill: 3    3. Hyperlipidemia LDL goal <130  Stable symptoms  - Lipid Profile (Chol, Trig, HDL, LDL calc)  - atorvastatin (LIPITOR) 10 MG tablet; Take 0.5 tablets (5 mg) by mouth daily TAKE 1/2 TABLET BY MOUTH DAILY  Dispense: 45 tablet; Refill: 3    4. Stage 3a chronic kidney disease  Stable   - COMPREHENSIVE METABOLIC PANEL    5. Screening for blood disease  Stable   - CBC with platelets    6. Visit for screening mammogram  Stable   - MA Screen Bilateral w/Jagdish; Future    7. Dry eyes  Has had dry eyes and mouth x 2 years along with right sided shoulder, elbow ,wrist pain .  Will rule out autoimmune/sjogrens   - Anti Nuclear Nelly IgG by IFA with Reflex  - Rheumatoid factor  - CRP, inflammation  - SSB La SABRINA Antibody IgG  - SSA Ro SABRINA Antibody IgG    8. Multiple joint pain    - Anti Nuclear Nelly IgG by IFA with Reflex  - Rheumatoid factor  - CRP, inflammation  - SSB La SABRINA Antibody IgG  - SSA Ro SABRINA Antibody IgG    Patient has been advised of split billing requirements and indicates understanding: Yes  COUNSELING:  Reviewed preventive health counseling, as reflected in patient instructions       Regular exercise       Healthy diet/nutrition       Vision screening       Hearing screening       Osteoporosis  "prevention/bone health       The 10-year ASCVD risk score (Luz BURNETT Jr., et al., 2013) is: 3.3%    Values used to calculate the score:      Age: 60 years      Sex: Female      Is Non- : No      Diabetic: No      Tobacco smoker: No      Systolic Blood Pressure: 120 mmHg      Is BP treated: No      HDL Cholesterol: 51 mg/dL      Total Cholesterol: 168 mg/dL    Estimated body mass index is 31.47 kg/m  as calculated from the following:    Height as of 9/9/20: 1.676 m (5' 6\").    Weight as of 9/9/20: 88.5 kg (195 lb).    Weight management plan: Discussed healthy diet and exercise guidelines    She reports that she has never smoked. She has never used smokeless tobacco.      Counseling Resources:  ATP IV Guidelines  Pooled Cohorts Equation Calculator  Breast Cancer Risk Calculator  BRCA-Related Cancer Risk Assessment: FHS-7 Tool  FRAX Risk Assessment  ICSI Preventive Guidelines  Dietary Guidelines for Americans, 2010  USDA's MyPlate  ASA Prophylaxis  Lung CA Screening    Ramona Ann Aaseby-Aguilera, PA-C  [facility]  "

## 2021-07-16 LAB
ANA SER QL IF: NEGATIVE
ENA SS-A AB SER IA-ACNC: 0.5 U/ML
ENA SS-A AB SER IA-ACNC: NEGATIVE
ENA SS-B IGG SER IA-ACNC: <0.6 U/ML
ENA SS-B IGG SER IA-ACNC: NEGATIVE

## 2021-07-21 LAB — RHEUMATOID FACT SER NEPH-ACNC: <7 IU/ML

## 2021-07-26 DIAGNOSIS — E03.9 HYPOTHYROIDISM, UNSPECIFIED TYPE: ICD-10-CM

## 2021-07-26 RX ORDER — LEVOTHYROXINE SODIUM 75 UG/1
TABLET ORAL
Qty: 90 TABLET | Refills: 3 | OUTPATIENT
Start: 2021-07-26

## 2021-08-27 ENCOUNTER — HOSPITAL ENCOUNTER (OUTPATIENT)
Dept: MAMMOGRAPHY | Facility: CLINIC | Age: 61
Discharge: HOME | End: 2021-08-27
Attending: PHYSICIAN ASSISTANT | Admitting: PHYSICIAN ASSISTANT
Payer: COMMERCIAL

## 2021-08-27 DIAGNOSIS — Z12.31 VISIT FOR SCREENING MAMMOGRAM: ICD-10-CM

## 2021-08-27 PROCEDURE — 77063 BREAST TOMOSYNTHESIS BI: CPT

## 2021-09-03 ENCOUNTER — HOSPITAL ENCOUNTER (OUTPATIENT)
Dept: ULTRASOUND IMAGING | Facility: CLINIC | Age: 61
End: 2021-09-03
Attending: PHYSICIAN ASSISTANT
Payer: COMMERCIAL

## 2021-09-03 DIAGNOSIS — R92.8 ABNORMAL MAMMOGRAM: ICD-10-CM

## 2021-09-03 PROCEDURE — 76642 ULTRASOUND BREAST LIMITED: CPT | Mod: RT

## 2021-09-18 ENCOUNTER — HEALTH MAINTENANCE LETTER (OUTPATIENT)
Age: 61
End: 2021-09-18

## 2021-10-29 ENCOUNTER — ALLIED HEALTH/NURSE VISIT (OUTPATIENT)
Dept: FAMILY MEDICINE | Facility: CLINIC | Age: 61
End: 2021-10-29
Payer: COMMERCIAL

## 2021-10-29 DIAGNOSIS — Z23 NEED FOR PROPHYLACTIC VACCINATION AND INOCULATION AGAINST INFLUENZA: Primary | ICD-10-CM

## 2021-10-29 PROCEDURE — 90471 IMMUNIZATION ADMIN: CPT

## 2021-10-29 PROCEDURE — 90750 HZV VACC RECOMBINANT IM: CPT

## 2021-10-29 PROCEDURE — 91300 COVID-19,PF,PFIZER (12+ YRS): CPT

## 2021-10-29 PROCEDURE — 99207 PR NO CHARGE NURSE ONLY: CPT

## 2021-10-29 PROCEDURE — 0003A COVID-19,PF,PFIZER (12+ YRS): CPT

## 2021-10-29 NOTE — PROGRESS NOTES
Prior to immunization administration, verified patients identity using patient s name and date of birth. Please see Immunization Activity for additional information.     Screening Questionnaire for Adult Immunization    Are you sick today?   No   Do you have allergies to medications, food, a vaccine component or latex?   No   Have you ever had a serious reaction after receiving a vaccination?   No   Do you have a long-term health problem with heart, lung, kidney, or metabolic disease (e.g., diabetes), asthma, a blood disorder, no spleen, complement component deficiency, a cochlear implant, or a spinal fluid leak?  Are you on long-term aspirin therapy?   No   Do you have cancer, leukemia, HIV/AIDS, or any other immune system problem?   No   Do you have a parent, brother, or sister with an immune system problem?   No   In the past 3 months, have you taken medications that affect  your immune system, such as prednisone, other steroids, or anticancer drugs; drugs for the treatment of rheumatoid arthritis, Crohn s disease, or psoriasis; or have you had radiation treatments?   No   Have you had a seizure, or a brain or other nervous system problem?   No   During the past year, have you received a transfusion of blood or blood    products, or been given immune (gamma) globulin or antiviral drug?   No   For women: Are you pregnant or is there a chance you could become       pregnant during the next month?   No   Have you received any vaccinations in the past 4 weeks?   No     Immunization questionnaire answers were all negative.        Per orders of Dr. Dejesus, injection of Shingrix, pfizer given by Vivien Marie. Patient instructed to remain in clinic for 15 minutes afterwards, and to report any adverse reaction to me immediately.       Screening performed by Vivien Marie on 10/29/2021 at 8:46 AM.

## 2021-11-16 ENCOUNTER — E-VISIT (OUTPATIENT)
Dept: URGENT CARE | Facility: URGENT CARE | Age: 61
End: 2021-11-16
Payer: COMMERCIAL

## 2021-11-16 DIAGNOSIS — Z20.822 SUSPECTED COVID-19 VIRUS INFECTION: Primary | ICD-10-CM

## 2021-11-16 PROCEDURE — 99421 OL DIG E/M SVC 5-10 MIN: CPT | Performed by: PHYSICIAN ASSISTANT

## 2021-11-16 NOTE — PATIENT INSTRUCTIONS
Dear Selena Saenz,    Your symptoms show that you may have coronavirus (COVID-19). This illness can cause fever, cough and trouble breathing. Many people get a mild case and get better on their own. Some people can get very sick.    Will I be tested for COVID-19?  We would like to test you for Covid-19 virus. I have placed orders for this test.     To schedule: go to your [facility] home page and scroll down to the section that says  You have an appointment that needs to be scheduled  and click the large green button that says  Schedule Now  and follow the steps to find the next available openings.    If you are unable to complete these [facility] scheduling steps, please call [phone] to schedule your testing.     Return to work/school/ guidance:  Please let your workplace manager and staffing office know when your quarantine ends     We can t give you an exact date as it depends on the above. You can calculate this on your own or work with your manager/staffing office to calculate this. (For example if you were exposed on 10/4, you would have to quarantine for 14 full days. That would be through 10/18. You could return on 10/19.)      If you receive a positive COVID-19 test result, follow the guidance of the those who are giving you the results. Usually the return to work is 10 (or in some cases 20 days from symptom onset.) If you work at [facility], you must also be cleared by Employee Occupational Health and Safety to return to work.        If you receive a negative COVID-19 test result and did not have a high risk exposure to someone with a known positive COVID-19 test, you can return to work once you're free of fever for 24 hours without fever-reducing medication and your symptoms are improving or resolved.      If you receive a negative COVID-19 test and If you had a high risk exposure to someone who has tested positive for COVID-19 then you can return to work 14 days after your last contact  with the positive individual    Note: If you have ongoing exposure to the covid positive person, this quarantine period may be more than 14 days. (For example, if you are continued to be exposed to your child who tested positive and cannot isolate from them, then the quarantine of 7-14 days can't start until your child is no longer contagious. This is typically 10 days from onset of the child's symptoms. So the total duration may be 17-24 days in this case.)    Sign up for [email or web address].   We know it's scary to hear that you might have COVID-19. We want to track your symptoms to make sure you're okay over the next 2 weeks. Please look for an email from [email or web address]--this is a free, online program that we'll use to keep in touch. To sign up, follow the link in the email you will receive. Learn more at [email or web address]    How can I take care of myself?    Get lots of rest. Drink extra fluids (unless a doctor has told you not to)    Take Tylenol (acetaminophen) or ibuprofen for fever or pain. If you have liver or kidney problems, ask your family doctor if it's okay to take Tylenol o ibuprofen    If you have other health problems (like cancer, heart failure, an organ transplant or severe kidney disease): Call your specialty clinic if you don't feel better in the next 2 days.    Know when to call 911. Emergency warning signs include:  o Trouble breathing or shortness of breath  o Pain or pressure in the chest that doesn't go away  o Feeling confused like you haven't felt before, or not being able to wake up  o Bluish-colored lips or face    Where can I get more information?  [facility] - About COVID-19:   [email or web address]    CDC - What to Do If You're Sick:   www.cdc.gov/coronavirus/2019-ncov/about/steps-when-sick.html

## 2021-11-17 ENCOUNTER — LAB (OUTPATIENT)
Dept: URGENT CARE | Facility: URGENT CARE | Age: 61
End: 2021-11-17
Attending: PHYSICIAN ASSISTANT
Payer: COMMERCIAL

## 2021-11-17 DIAGNOSIS — Z20.822 SUSPECTED COVID-19 VIRUS INFECTION: ICD-10-CM

## 2021-11-17 LAB — SARS-COV-2 RNA RESP QL NAA+PROBE: NEGATIVE

## 2021-11-17 PROCEDURE — U0003 INFECTIOUS AGENT DETECTION BY NUCLEIC ACID (DNA OR RNA); SEVERE ACUTE RESPIRATORY SYNDROME CORONAVIRUS 2 (SARS-COV-2) (CORONAVIRUS DISEASE [COVID-19]), AMPLIFIED PROBE TECHNIQUE, MAKING USE OF HIGH THROUGHPUT TECHNOLOGIES AS DESCRIBED BY CMS-2020-01-R: HCPCS

## 2021-11-17 PROCEDURE — U0005 INFEC AGEN DETEC AMPLI PROBE: HCPCS

## 2022-03-14 ENCOUNTER — HOSPITAL ENCOUNTER (OUTPATIENT)
Dept: MAMMOGRAPHY | Facility: CLINIC | Age: 62
Discharge: HOME | End: 2022-03-14
Attending: PHYSICIAN ASSISTANT
Payer: COMMERCIAL

## 2022-03-14 ENCOUNTER — HOSPITAL ENCOUNTER (OUTPATIENT)
Dept: ULTRASOUND IMAGING | Facility: CLINIC | Age: 62
Discharge: HOME | End: 2022-03-14
Attending: PHYSICIAN ASSISTANT
Payer: COMMERCIAL

## 2022-03-14 DIAGNOSIS — N60.01 CYST OF RIGHT BREAST: ICD-10-CM

## 2022-03-14 PROCEDURE — 77061 BREAST TOMOSYNTHESIS UNI: CPT | Mod: RT

## 2022-03-14 PROCEDURE — 76642 ULTRASOUND BREAST LIMITED: CPT | Mod: RT

## 2022-03-21 ENCOUNTER — HOSPITAL ENCOUNTER (OUTPATIENT)
Dept: MAMMOGRAPHY | Facility: CLINIC | Age: 62
Discharge: HOME | End: 2022-03-21
Attending: PHYSICIAN ASSISTANT
Payer: COMMERCIAL

## 2022-03-21 DIAGNOSIS — N60.01 CYST OF RIGHT BREAST: ICD-10-CM

## 2022-03-21 DIAGNOSIS — R92.8 ABNORMAL MAMMOGRAM: ICD-10-CM

## 2022-03-21 PROCEDURE — 88305 TISSUE EXAM BY PATHOLOGIST: CPT | Mod: TC | Performed by: PHYSICIAN ASSISTANT

## 2022-03-21 PROCEDURE — 250N000009 HC RX 250: Performed by: PHYSICIAN ASSISTANT

## 2022-03-21 PROCEDURE — 272N000715 MA STEREOTACTIC BREAST BIOPSY VACUUM RT

## 2022-03-21 PROCEDURE — 999N000065 MA POST PROCEDURE RIGHT

## 2022-03-21 RX ORDER — LIDOCAINE HYDROCHLORIDE AND EPINEPHRINE 10; 10 MG/ML; UG/ML
10 INJECTION, SOLUTION INFILTRATION; PERINEURAL ONCE
Status: COMPLETED | OUTPATIENT
Start: 2022-03-21 | End: 2022-03-21

## 2022-03-21 RX ADMIN — LIDOCAINE HYDROCHLORIDE 5 ML: 10 INJECTION, SOLUTION EPIDURAL; INFILTRATION; INTRACAUDAL; PERINEURAL at 13:12

## 2022-03-21 RX ADMIN — LIDOCAINE HYDROCHLORIDE AND EPINEPHRINE 10 ML: 10; 10 INJECTION, SOLUTION INFILTRATION; PERINEURAL at 13:13

## 2022-03-21 NOTE — DISCHARGE INSTRUCTIONS

## 2022-03-22 LAB
PATH REPORT.COMMENTS IMP SPEC: NORMAL
PATH REPORT.FINAL DX SPEC: NORMAL
PATH REPORT.GROSS SPEC: NORMAL
PATH REPORT.MICROSCOPIC SPEC OTHER STN: NORMAL
PATH REPORT.RELEVANT HX SPEC: NORMAL
PHOTO IMAGE: NORMAL

## 2022-03-22 PROCEDURE — 88305 TISSUE EXAM BY PATHOLOGIST: CPT | Mod: 26 | Performed by: PATHOLOGY

## 2022-03-23 ENCOUNTER — TELEPHONE (OUTPATIENT)
Dept: MAMMOGRAPHY | Facility: CLINIC | Age: 62
End: 2022-03-23
Payer: COMMERCIAL

## 2022-03-23 NOTE — TELEPHONE ENCOUNTER
After review by Breast Center Radiologist, Dr. Antonio Bell, Ms. Saenz was called,  verified, and given her 3/21/2022 Right Breast Biopsy results (Benign Cyst) and recommended Follow up (Annual Screening Mammograms).   Patient denies any concerns with the biopsy site. Steri-Strips Intact.  Ordering provider was informed of the results and follow up plan.  I encouraged her to contact her doctor with any further breast concerns.  Patient verbalized understanding and agrees with the plan of care.      [facility]  Selena Saenz [id number]  F, 1960  Surgical Pathology Report (Final result) [id number]  Authorizing Provider: Aaseby-Aguilera, Ramona Ann, PA-C  Ordering Provider: Aaseby-Aguilera, Ramona Ann, PA-C  Ordering Location: [facility]  Collected: 2022 01:15 PM  Pathologist: Jefferson Gilliam MD Received: 2022 02:25 PM  .  Specimens  A Breast, Right  .  .  Final Diagnosis  A. Right breast biopsy of 1.5 cm lesion, 7 cm from the nipple:  - Negative for malignancy.  - Complex benign apocrine cyst without atypia.  - Minute focal usual type ductal hyperplasia.  Electronically signed by Jefferson Gilliam MD on 3/22/2022 at 5:58 PM

## 2022-04-08 ENCOUNTER — ALLIED HEALTH/NURSE VISIT (OUTPATIENT)
Dept: FAMILY MEDICINE | Facility: CLINIC | Age: 62
End: 2022-04-08
Payer: COMMERCIAL

## 2022-04-08 DIAGNOSIS — Z23 NEED FOR SHINGLES VACCINE: Primary | ICD-10-CM

## 2022-04-08 PROCEDURE — 99207 PR NO CHARGE NURSE ONLY: CPT

## 2022-04-08 PROCEDURE — 90471 IMMUNIZATION ADMIN: CPT

## 2022-04-08 PROCEDURE — 90750 HZV VACC RECOMBINANT IM: CPT

## 2022-05-03 ENCOUNTER — VIRTUAL VISIT (OUTPATIENT)
Dept: URGENT CARE | Facility: CLINIC | Age: 62
End: 2022-05-03
Payer: COMMERCIAL

## 2022-05-03 DIAGNOSIS — E66.9 NON MORBID OBESITY, UNSPECIFIED OBESITY TYPE: Primary | ICD-10-CM

## 2022-05-03 DIAGNOSIS — U07.1 CLINICAL DIAGNOSIS OF COVID-19: ICD-10-CM

## 2022-05-03 PROCEDURE — 99214 OFFICE O/P EST MOD 30 MIN: CPT | Mod: 95 | Performed by: INTERNAL MEDICINE

## 2022-05-03 NOTE — PROGRESS NOTES
"Selena is a 61 year old who is being evaluated via a billable video visit.      How would you like to obtain your AVS? MyChart  If the video visit is dropped, the invitation should be resent by: Text to cell phone: -  Will anyone else be joining your video visit? No      Video Start Time: 2:30 PM    Assessment & Plan     Clinical diagnosis of COVID-19  Non morbid obesity    1.  Take Paxlovid dosepack  2.  While on medication, don't take atorvastatin.               BMI:   Estimated body mass index is 33.12 kg/m  as calculated from the following:    Height as of 7/15/21: 1.664 m (5' 5.5\").    Weight as of 7/15/21: 91.7 kg (202 lb 1.6 oz).           No follow-ups on file.    Kasi Carl MD  [facility]    Subjective   Selena is a 61 year old who presents for the following health issues     HPI       COVID-19 Symptom Review  How many days ago did these symptoms start? 2-3, positive test today at home    Are any of the following symptoms significant for you?    New or worsening difficulty breathing? No    Worsening cough? Yes, it's a dry cough.     Fever or chills? Yes, the highest temperature was 101    Headache: YES, mild    Sore throat: YES    Chest pain: no    Diarrhea: no    Body aches? YES    What treatments has patient tried? Acetaminophen   Does patient live in a nursing home, group home, or shelter? no  Does patient have a way to get food/medications during quarantined? Yes, I have a friend or family member who can help me.              Review of Systems         Objective           Vitals:  No vitals were obtained today due to virtual visit.    Physical Exam   GENERAL: alert, no distress and appears disheveled/ill  EYES: Eyes grossly normal to inspection.  No discharge or erythema, or obvious scleral/conjunctival abnormalities.  RESP: No audible wheeze, cough, or visible cyanosis.  No visible retractions or increased work of breathing.    SKIN: Visible skin clear. No significant rash, " abnormal pigmentation or lesions.  NEURO: Cranial nerves grossly intact.  Mentation and speech appropriate for age.  PSYCH: Mentation appears normal, affect normal/bright, judgement and insight intact, normal speech and appearance well-groomed.                Video-Visit Details    Type of service:  Video Visit    Video End Time:2:43 PM    Originating Location (pt. Location): Home    Distant Location (provider location):  [facility]     Platform used for Video Visit: Marcus

## 2022-05-03 NOTE — PATIENT INSTRUCTIONS
Assessment & Plan     Clinical diagnosis of COVID-19  Non morbid obesity    1.  Take Paxlovid dosepack  2.  While on medication, don't take atorvastatin.

## 2022-07-01 ENCOUNTER — IMMUNIZATION (OUTPATIENT)
Dept: FAMILY MEDICINE | Facility: CLINIC | Age: 62
End: 2022-07-01
Payer: COMMERCIAL

## 2022-07-01 DIAGNOSIS — Z23 HIGH PRIORITY FOR 2019-NCOV VACCINE: Primary | ICD-10-CM

## 2022-07-01 PROCEDURE — 91305 COVID-19,PF,PFIZER (12+ YRS): CPT

## 2022-07-01 PROCEDURE — 0054A COVID-19,PF,PFIZER (12+ YRS): CPT

## 2022-07-25 SDOH — ECONOMIC STABILITY: FOOD INSECURITY: WITHIN THE PAST 12 MONTHS, YOU WORRIED THAT YOUR FOOD WOULD RUN OUT BEFORE YOU GOT MONEY TO BUY MORE.: NEVER TRUE

## 2022-07-25 SDOH — HEALTH STABILITY: PHYSICAL HEALTH: ON AVERAGE, HOW MANY MINUTES DO YOU ENGAGE IN EXERCISE AT THIS LEVEL?: 0 MIN

## 2022-07-25 SDOH — ECONOMIC STABILITY: INCOME INSECURITY: IN THE LAST 12 MONTHS, WAS THERE A TIME WHEN YOU WERE NOT ABLE TO PAY THE MORTGAGE OR RENT ON TIME?: NO

## 2022-07-25 SDOH — ECONOMIC STABILITY: TRANSPORTATION INSECURITY
IN THE PAST 12 MONTHS, HAS LACK OF TRANSPORTATION KEPT YOU FROM MEETINGS, WORK, OR FROM GETTING THINGS NEEDED FOR DAILY LIVING?: NO

## 2022-07-25 SDOH — ECONOMIC STABILITY: INCOME INSECURITY: HOW HARD IS IT FOR YOU TO PAY FOR THE VERY BASICS LIKE FOOD, HOUSING, MEDICAL CARE, AND HEATING?: NOT HARD AT ALL

## 2022-07-25 SDOH — ECONOMIC STABILITY: TRANSPORTATION INSECURITY
IN THE PAST 12 MONTHS, HAS THE LACK OF TRANSPORTATION KEPT YOU FROM MEDICAL APPOINTMENTS OR FROM GETTING MEDICATIONS?: NO

## 2022-07-25 SDOH — HEALTH STABILITY: PHYSICAL HEALTH: ON AVERAGE, HOW MANY DAYS PER WEEK DO YOU ENGAGE IN MODERATE TO STRENUOUS EXERCISE (LIKE A BRISK WALK)?: 0 DAYS

## 2022-07-25 SDOH — ECONOMIC STABILITY: FOOD INSECURITY: WITHIN THE PAST 12 MONTHS, THE FOOD YOU BOUGHT JUST DIDN'T LAST AND YOU DIDN'T HAVE MONEY TO GET MORE.: NEVER TRUE

## 2022-07-25 ASSESSMENT — ENCOUNTER SYMPTOMS
DIARRHEA: 0
MYALGIAS: 0
FREQUENCY: 0
CHILLS: 0
DYSURIA: 0
EYE PAIN: 0
COUGH: 0
NERVOUS/ANXIOUS: 0
WEAKNESS: 0
HEADACHES: 0
HEMATOCHEZIA: 0
ARTHRALGIAS: 0
SORE THROAT: 0
PARESTHESIAS: 0
BREAST MASS: 0
FEVER: 0
ABDOMINAL PAIN: 0
DIZZINESS: 0
PALPITATIONS: 0
HEARTBURN: 0
HEMATURIA: 0
SHORTNESS OF BREATH: 0
NAUSEA: 0
JOINT SWELLING: 0
CONSTIPATION: 0

## 2022-07-25 ASSESSMENT — LIFESTYLE VARIABLES
SKIP TO QUESTIONS 9-10: 1
HOW OFTEN DO YOU HAVE A DRINK CONTAINING ALCOHOL: 2-3 TIMES A WEEK
AUDIT-C TOTAL SCORE: 3
HOW MANY STANDARD DRINKS CONTAINING ALCOHOL DO YOU HAVE ON A TYPICAL DAY: 1 OR 2
HOW OFTEN DO YOU HAVE SIX OR MORE DRINKS ON ONE OCCASION: NEVER

## 2022-07-25 ASSESSMENT — SOCIAL DETERMINANTS OF HEALTH (SDOH)
HOW OFTEN DO YOU GET TOGETHER WITH FRIENDS OR RELATIVES?: ONCE A WEEK
IN A TYPICAL WEEK, HOW MANY TIMES DO YOU TALK ON THE PHONE WITH FAMILY, FRIENDS, OR NEIGHBORS?: THREE TIMES A WEEK
DO YOU BELONG TO ANY CLUBS OR ORGANIZATIONS SUCH AS CHURCH GROUPS UNIONS, FRATERNAL OR ATHLETIC GROUPS, OR SCHOOL GROUPS?: NO
HOW OFTEN DO YOU ATTEND CHURCH OR RELIGIOUS SERVICES?: NEVER

## 2022-07-28 ENCOUNTER — OFFICE VISIT (OUTPATIENT)
Dept: FAMILY MEDICINE | Facility: CLINIC | Age: 62
End: 2022-07-28
Payer: COMMERCIAL

## 2022-07-28 VITALS
SYSTOLIC BLOOD PRESSURE: 152 MMHG | DIASTOLIC BLOOD PRESSURE: 82 MMHG | HEART RATE: 64 BPM | OXYGEN SATURATION: 98 % | BODY MASS INDEX: 32.78 KG/M2 | HEIGHT: 66 IN | WEIGHT: 204 LBS | RESPIRATION RATE: 16 BRPM

## 2022-07-28 DIAGNOSIS — E03.9 ACQUIRED HYPOTHYROIDISM: ICD-10-CM

## 2022-07-28 DIAGNOSIS — Z13.220 SCREENING FOR HYPERLIPIDEMIA: ICD-10-CM

## 2022-07-28 DIAGNOSIS — Z00.00 ROUTINE GENERAL MEDICAL EXAMINATION AT A HEALTH CARE FACILITY: Primary | ICD-10-CM

## 2022-07-28 DIAGNOSIS — N18.31 STAGE 3A CHRONIC KIDNEY DISEASE (H): ICD-10-CM

## 2022-07-28 DIAGNOSIS — E78.5 HYPERLIPIDEMIA LDL GOAL <130: ICD-10-CM

## 2022-07-28 DIAGNOSIS — E03.9 HYPOTHYROIDISM, UNSPECIFIED TYPE: ICD-10-CM

## 2022-07-28 DIAGNOSIS — R03.0 ELEVATED BP WITHOUT DIAGNOSIS OF HYPERTENSION: ICD-10-CM

## 2022-07-28 LAB
ALBUMIN SERPL-MCNC: 3.5 G/DL (ref 3.4–5)
ALP SERPL-CCNC: 93 U/L (ref 40–150)
ALT SERPL W P-5'-P-CCNC: 56 U/L (ref 0–50)
ANION GAP SERPL CALCULATED.3IONS-SCNC: 9 MMOL/L (ref 3–14)
AST SERPL W P-5'-P-CCNC: 34 U/L (ref 0–45)
BILIRUB SERPL-MCNC: 0.7 MG/DL (ref 0.2–1.3)
BUN SERPL-MCNC: 13 MG/DL (ref 7–30)
CALCIUM SERPL-MCNC: 9.1 MG/DL (ref 8.5–10.1)
CHLORIDE BLD-SCNC: 107 MMOL/L (ref 94–109)
CHOLEST SERPL-MCNC: 155 MG/DL
CO2 SERPL-SCNC: 22 MMOL/L (ref 20–32)
CREAT SERPL-MCNC: 0.83 MG/DL (ref 0.52–1.04)
CREAT UR-MCNC: 23 MG/DL
ERYTHROCYTE [DISTWIDTH] IN BLOOD BY AUTOMATED COUNT: 14 % (ref 10–15)
FASTING STATUS PATIENT QL REPORTED: YES
GFR SERPL CREATININE-BSD FRML MDRD: 79 ML/MIN/1.73M2
GLUCOSE BLD-MCNC: 90 MG/DL (ref 70–99)
HCT VFR BLD AUTO: 41.2 % (ref 35–47)
HDLC SERPL-MCNC: 54 MG/DL
HGB BLD-MCNC: 13.4 G/DL (ref 11.7–15.7)
LDLC SERPL CALC-MCNC: 77 MG/DL
MCH RBC QN AUTO: 27.9 PG (ref 26.5–33)
MCHC RBC AUTO-ENTMCNC: 32.5 G/DL (ref 31.5–36.5)
MCV RBC AUTO: 86 FL (ref 78–100)
MICROALBUMIN UR-MCNC: <5 MG/L
MICROALBUMIN/CREAT UR: NORMAL MG/G{CREAT}
NONHDLC SERPL-MCNC: 101 MG/DL
PLATELET # BLD AUTO: 258 10E3/UL (ref 150–450)
POTASSIUM BLD-SCNC: 3.9 MMOL/L (ref 3.4–5.3)
PROT SERPL-MCNC: 7.4 G/DL (ref 6.8–8.8)
RBC # BLD AUTO: 4.81 10E6/UL (ref 3.8–5.2)
SODIUM SERPL-SCNC: 138 MMOL/L (ref 133–144)
TRIGL SERPL-MCNC: 120 MG/DL
TSH SERPL DL<=0.005 MIU/L-ACNC: 2.04 MU/L (ref 0.4–4)
WBC # BLD AUTO: 6.8 10E3/UL (ref 4–11)

## 2022-07-28 PROCEDURE — 36415 COLL VENOUS BLD VENIPUNCTURE: CPT | Performed by: PHYSICIAN ASSISTANT

## 2022-07-28 PROCEDURE — 80053 COMPREHEN METABOLIC PANEL: CPT | Performed by: PHYSICIAN ASSISTANT

## 2022-07-28 PROCEDURE — 80061 LIPID PANEL: CPT | Performed by: PHYSICIAN ASSISTANT

## 2022-07-28 PROCEDURE — 99396 PREV VISIT EST AGE 40-64: CPT | Performed by: PHYSICIAN ASSISTANT

## 2022-07-28 PROCEDURE — 85027 COMPLETE CBC AUTOMATED: CPT | Performed by: PHYSICIAN ASSISTANT

## 2022-07-28 PROCEDURE — 84443 ASSAY THYROID STIM HORMONE: CPT | Performed by: PHYSICIAN ASSISTANT

## 2022-07-28 PROCEDURE — 82043 UR ALBUMIN QUANTITATIVE: CPT | Performed by: PHYSICIAN ASSISTANT

## 2022-07-28 RX ORDER — ATORVASTATIN CALCIUM 10 MG/1
TABLET, FILM COATED ORAL
Qty: 45 TABLET | Refills: 3 | Status: SHIPPED | OUTPATIENT
Start: 2022-07-28 | End: 2023-02-03

## 2022-07-28 RX ORDER — LEVOTHYROXINE SODIUM 75 UG/1
75 TABLET ORAL DAILY
Qty: 90 TABLET | Refills: 3 | Status: SHIPPED | OUTPATIENT
Start: 2022-07-28 | End: 2023-02-03

## 2022-07-28 ASSESSMENT — ENCOUNTER SYMPTOMS
DYSURIA: 0
NERVOUS/ANXIOUS: 0
EYE PAIN: 0
MYALGIAS: 0
ABDOMINAL PAIN: 0
SHORTNESS OF BREATH: 0
FREQUENCY: 0
NAUSEA: 0
SORE THROAT: 0
CONSTIPATION: 0
HEMATOCHEZIA: 0
HEMATURIA: 0
COUGH: 0
FEVER: 0
PARESTHESIAS: 0
JOINT SWELLING: 0
HEADACHES: 0
WEAKNESS: 0
HEARTBURN: 0
DIZZINESS: 0
ARTHRALGIAS: 0
DIARRHEA: 0
BREAST MASS: 0
CHILLS: 0
PALPITATIONS: 0

## 2022-07-28 NOTE — PROGRESS NOTES
SUBJECTIVE:   CC: Selena Saenz is an 61 year old woman who presents for preventive health visit.       Patient has been advised of split billing requirements and indicates understanding: Yes  Healthy Habits:     Getting at least 3 servings of Calcium per day:  Yes    Bi-annual eye exam:  NO    Dental care twice a year:  NO    Sleep apnea or symptoms of sleep apnea:  None    Diet:  Regular (no restrictions)    Frequency of exercise:  None    Taking medications regularly:  Yes    Medication side effects:  None    PHQ-2 Total Score: 0    Additional concerns today:  No            Today's PHQ-2 Score:   PHQ-2 ( 1999 Pfizer) 7/25/2022   Q1: Little interest or pleasure in doing things 0   Q2: Feeling down, depressed or hopeless 0   PHQ-2 Score 0   PHQ-2 Total Score (12-17 Years)- Positive if 3 or more points; Administer PHQ-A if positive -   Q1: Little interest or pleasure in doing things Not at all   Q2: Feeling down, depressed or hopeless Not at all   PHQ-2 Score 0       Abuse: Current or Past (Physical, Sexual or Emotional) - No  Do you feel safe in your environment? Yes        Social History     Tobacco Use     Smoking status: Never Smoker     Smokeless tobacco: Never Used   Substance Use Topics     Alcohol use: Yes     Comment: 2-3 glasses of wine weekly         Alcohol Use 7/25/2022   Prescreen: >3 drinks/day or >7 drinks/week? No   Prescreen: >3 drinks/day or >7 drinks/week? -       Reviewed orders with patient.  Reviewed health maintenance and updated orders accordingly - Yes  BP Readings from Last 3 Encounters:   07/28/22 (!) 152/82   07/15/21 120/72   09/09/20 118/60    Wt Readings from Last 3 Encounters:   07/28/22 92.5 kg (204 lb)   07/15/21 91.7 kg (202 lb 1.6 oz)   09/09/20 88.5 kg (195 lb)                  Recent Labs   Lab Test 07/15/21  0837 07/12/20  0923 03/14/19  0902   * 96 110*   HDL 65 51 53   TRIG 94 103 93   ALT 76* 50 42   CR 0.95 0.83 0.88   GFRESTIMATED 64 75 71   GFRESTBLACK  --   87 83   POTASSIUM 4.2 4.3 3.9   TSH 1.66 1.11 2.08        Breast Cancer Screening:    FHS-7:   Breast CA Risk Assessment (FHS-7) 8/27/2021 3/14/2022 7/25/2022   Did any of your first-degree relatives have breast or ovarian cancer? No No No   Did any of your relatives have bilateral breast cancer? No No Unknown   Did any man in your family have breast cancer? No No No   Did any woman in your family have breast and ovarian cancer? No No Unknown   Did any woman in your family have breast cancer before age 50 y? No No Unknown   Do you have 2 or more relatives with breast and/or ovarian cancer? No No Unknown   Do you have 2 or more relatives with breast and/or bowel cancer? No No Unknown     click delete button to remove this line now  Mammogram Screening: Recommended mammography every 1-2 years with patient discussion and risk factor consideration  Pertinent mammograms are reviewed under the imaging tab.    History of abnormal Pap smear: NO - age 30- 65 PAP every 3 years recommended  PAP / HPV Latest Ref Rng & Units 7/8/2020 2/8/2016 2/13/2013   PAP (Historical) - NIL NIL NIL   HPV16 NEG:Negative Negative Negative -   HPV18 NEG:Negative Negative Negative -   HRHPV NEG:Negative Negative Negative -     Reviewed and updated as needed this visit by clinical staff   Tobacco  Allergies      Fam Hx  Soc Hx          Reviewed and updated as needed this visit by Provider                       Review of Systems   Constitutional: Negative for chills and fever.   HENT: Negative for congestion, ear pain, hearing loss and sore throat.    Eyes: Negative for pain and visual disturbance.   Respiratory: Negative for cough and shortness of breath.    Cardiovascular: Negative for chest pain, palpitations and peripheral edema.   Gastrointestinal: Negative for abdominal pain, constipation, diarrhea, heartburn, hematochezia and nausea.   Breasts:  Negative for tenderness, breast mass and discharge.   Genitourinary: Negative for dysuria,  "frequency, genital sores, hematuria, pelvic pain, urgency, vaginal bleeding and vaginal discharge.   Musculoskeletal: Negative for arthralgias, joint swelling and myalgias.   Skin: Negative for rash.   Neurological: Negative for dizziness, weakness, headaches and paresthesias.   Psychiatric/Behavioral: Negative for mood changes. The patient is not nervous/anxious.           OBJECTIVE:   BP (!) 152/82 (BP Location: Right arm, Patient Position: Chair, Cuff Size: Adult Large)   Pulse 64   Resp 16   Ht 1.664 m (5' 5.5\")   Wt 92.5 kg (204 lb)   LMP 09/14/2013   SpO2 98%   Breastfeeding No   BMI 33.43 kg/m    Physical Exam  GENERAL APPEARANCE: healthy, alert and no distress  EYES: Eyes grossly normal to inspection, PERRL and conjunctivae and sclerae normal  HENT: ear canals and TM's normal, nose and mouth without ulcers or lesions, oropharynx clear and oral mucous membranes moist  NECK: no adenopathy, no asymmetry, masses, or scars and thyroid normal to palpation  RESP: lungs clear to auscultation - no rales, rhonchi or wheezes  BREAST: normal without masses, tenderness or nipple discharge and no palpable axillary masses or adenopathy  CV: regular rate and rhythm, normal S1 S2, no S3 or S4, no murmur, click or rub, no peripheral edema and peripheral pulses strong  ABDOMEN: soft, nontender, no hepatosplenomegaly, no masses and bowel sounds normal  MS: no musculoskeletal defects are noted and gait is age appropriate without ataxia  SKIN: no suspicious lesions or rashes  NEURO: Normal strength and tone, sensory exam grossly normal, mentation intact and speech normal  PSYCH: mentation appears normal and affect normal/bright    Diagnostic Test Results:  Labs reviewed in Epic    ASSESSMENT/PLAN:   (Z00.00) Routine general medical examination at a health care facility  (primary encounter diagnosis)  Comment:   Plan:     (Z13.220) Screening for hyperlipidemia  Comment:   Plan: Lipid panel reflex to direct LDL Fasting,     " "    atorvastatin (LIPITOR) 10 MG tablet            (E78.5) Hyperlipidemia LDL goal <130  Comment:   Plan: atorvastatin (LIPITOR) 10 MG tablet            (E03.9) Acquired hypothyroidism  Comment:   Plan: TSH WITH FREE T4 REFLEX            (N18.31) Stage 3a chronic kidney disease (H)  Comment:   Plan: Albumin Random Urine Quantitative with Creat         Ratio, COMPREHENSIVE METABOLIC PANEL, TSH WITH         FREE T4 REFLEX, CBC with platelets            (E03.9) Hypothyroidism, unspecified type  Comment:   Plan: levothyroxine (SYNTHROID/LEVOTHROID) 75 MCG         tablet      (R03.0) Elevated BP without diagnosis of hypertension  Comment:  Will have come in for nurse only BP check x1 and then follow-up for office visit in 1 month with BP diary.      Plan: chose to wait before starting new med but recommend lisiniprol due to CKD#3          COUNSELING:  Reviewed preventive health counseling, as reflected in patient instructions       Regular exercise       Healthy diet/nutrition       Vision screening    Estimated body mass index is 33.43 kg/m  as calculated from the following:    Height as of this encounter: 1.664 m (5' 5.5\").    Weight as of this encounter: 92.5 kg (204 lb).        She reports that she has never smoked. She has never used smokeless tobacco.      Counseling Resources:  ATP IV Guidelines  Pooled Cohorts Equation Calculator  Breast Cancer Risk Calculator  BRCA-Related Cancer Risk Assessment: FHS-7 Tool  FRAX Risk Assessment  ICSI Preventive Guidelines  Dietary Guidelines for Americans, 2010  USDA's MyPlate  ASA Prophylaxis  Lung CA Screening    Ramona Ann Aaseby-Aguilera, PA-C  [facility]  "

## 2022-08-19 ENCOUNTER — ALLIED HEALTH/NURSE VISIT (OUTPATIENT)
Dept: FAMILY MEDICINE | Facility: CLINIC | Age: 62
End: 2022-08-19
Payer: COMMERCIAL

## 2022-08-19 VITALS — SYSTOLIC BLOOD PRESSURE: 138 MMHG | DIASTOLIC BLOOD PRESSURE: 78 MMHG

## 2022-08-19 DIAGNOSIS — R03.0 ELEVATED BP WITHOUT DIAGNOSIS OF HYPERTENSION: Primary | ICD-10-CM

## 2022-08-19 PROCEDURE — 99207 PR NO CHARGE NURSE ONLY: CPT

## 2022-08-19 NOTE — PROGRESS NOTES
Selena Saenz is a 61 year old patient who comes in today for a Blood Pressure check.  Initial BP:  /78   LMP 2013      PTS 1ST BLOOD PRESSURE READIN/78.  Data Unavailable  Disposition: follow-up as previously indicated by provider

## 2022-09-01 ENCOUNTER — OFFICE VISIT (OUTPATIENT)
Dept: FAMILY MEDICINE | Facility: CLINIC | Age: 62
End: 2022-09-01
Payer: COMMERCIAL

## 2022-09-01 VITALS
RESPIRATION RATE: 16 BRPM | DIASTOLIC BLOOD PRESSURE: 68 MMHG | BODY MASS INDEX: 32.78 KG/M2 | WEIGHT: 204 LBS | HEART RATE: 74 BPM | OXYGEN SATURATION: 100 % | TEMPERATURE: 97.9 F | SYSTOLIC BLOOD PRESSURE: 134 MMHG | HEIGHT: 66 IN

## 2022-09-01 DIAGNOSIS — I10 BENIGN ESSENTIAL HYPERTENSION: Primary | ICD-10-CM

## 2022-09-01 PROCEDURE — 99213 OFFICE O/P EST LOW 20 MIN: CPT | Performed by: PHYSICIAN ASSISTANT

## 2022-09-01 RX ORDER — ATORVASTATIN CALCIUM 10 MG/1
5 TABLET, FILM COATED ORAL
COMMUNITY
End: 2022-09-01

## 2022-09-01 RX ORDER — LISINOPRIL 10 MG/1
10 TABLET ORAL DAILY
Qty: 30 TABLET | Refills: 1 | Status: SHIPPED | OUTPATIENT
Start: 2022-09-01 | End: 2022-09-29

## 2022-09-01 RX ORDER — LEVOTHYROXINE SODIUM 75 UG/1
75 TABLET ORAL
COMMUNITY
End: 2022-09-01

## 2022-09-01 NOTE — PROGRESS NOTES
Assessment & Plan     Benign essential hypertension  Selena has had high blood pressure lately. Opted to start on lisinopril.  Risks, benefits, side effects and intended purposes discussed.     Will have come in for nurse only BP check x 2 and then follow-up for office visit in 1 month with BP diary.      - lisinopril (ZESTRIL) 10 MG tablet; Take 1 tablet (10 mg) by mouth daily    [id number]}         Return in about 4 weeks (around 9/29/2022) for BP Recheck.    Ramona Ann Aaseby-Aguilera, PA-C  [facility]   Selena is a 61 year old, presenting for the following health issues:  No chief complaint on file.    The 10-year ASCVD risk score (Luzgucci BURNETT Jr., et al., 2013) is: 4.2%    Values used to calculate the score:      Age: 61 years      Sex: Female      Is Non- : No      Diabetic: No      Tobacco smoker: No      Systolic Blood Pressure: 134 mmHg      Is BP treated: No      HDL Cholesterol: 54 mg/dL      Total Cholesterol: 155 mg/dL    History of Present Illness       Hypertension: She presents for follow up of hypertension.  She does not check blood pressure  regularly outside of the clinic. Outside blood pressures have been over 140/90. She does not follow a low salt diet.             Review of Systems   Constitutional, HEENT, cardiovascular, pulmonary, gi and gu systems are negative, except as otherwise noted.      Objective    LMP 09/14/2013   There is no height or weight on file to calculate BMI.  Physical Exam   GENERAL: healthy, alert and no distress  EYES: Eyes grossly normal to inspection, PERRL and conjunctivae and sclerae normal  HENT: ear canals and TM's normal, nose and mouth without ulcers or lesions  RESP: lungs clear to auscultation - no rales, rhonchi or wheezes  CV: regular rate and rhythm, normal S1 S2, no S3 or S4, no murmur, click or rub, no peripheral edema and peripheral pulses strong                .  ..

## 2022-09-01 NOTE — PATIENT INSTRUCTIONS
(I10) Benign essential hypertension  (primary encounter diagnosis)  Comment:   Plan: lisinopril (ZESTRIL) 10 MG tablet        Will add lisinopril 10 mg.  Please take 1/2 tablet .   Will have come in for nurse only BP check x 1 and then follow-up for office visit in 1 month with BP diary.

## 2022-09-16 ENCOUNTER — ALLIED HEALTH/NURSE VISIT (OUTPATIENT)
Dept: FAMILY MEDICINE | Facility: CLINIC | Age: 62
End: 2022-09-16
Payer: COMMERCIAL

## 2022-09-16 VITALS — DIASTOLIC BLOOD PRESSURE: 64 MMHG | SYSTOLIC BLOOD PRESSURE: 122 MMHG

## 2022-09-16 DIAGNOSIS — I10 BENIGN ESSENTIAL HYPERTENSION: Primary | ICD-10-CM

## 2022-09-16 PROCEDURE — 99207 PR NO CHARGE NURSE ONLY: CPT

## 2022-09-16 NOTE — PROGRESS NOTES
BP Readings from Last 6 Encounters:   09/16/22 122/64   09/01/22 134/68   08/19/22 138/78   07/28/22 (!) 152/82   07/15/21 120/72   09/09/20 118/60     BP machine from home was 122/69, Wrist and alternate arm.

## 2022-09-29 ENCOUNTER — OFFICE VISIT (OUTPATIENT)
Dept: FAMILY MEDICINE | Facility: CLINIC | Age: 62
End: 2022-09-29
Payer: COMMERCIAL

## 2022-09-29 VITALS
RESPIRATION RATE: 16 BRPM | BODY MASS INDEX: 32.47 KG/M2 | WEIGHT: 202 LBS | HEIGHT: 66 IN | TEMPERATURE: 98.1 F | SYSTOLIC BLOOD PRESSURE: 118 MMHG | OXYGEN SATURATION: 99 % | HEART RATE: 83 BPM | DIASTOLIC BLOOD PRESSURE: 68 MMHG

## 2022-09-29 DIAGNOSIS — I10 BENIGN ESSENTIAL HYPERTENSION: ICD-10-CM

## 2022-09-29 PROCEDURE — 90471 IMMUNIZATION ADMIN: CPT | Performed by: PHYSICIAN ASSISTANT

## 2022-09-29 PROCEDURE — 99213 OFFICE O/P EST LOW 20 MIN: CPT | Mod: 25 | Performed by: PHYSICIAN ASSISTANT

## 2022-09-29 PROCEDURE — 90682 RIV4 VACC RECOMBINANT DNA IM: CPT | Performed by: PHYSICIAN ASSISTANT

## 2022-09-29 RX ORDER — LISINOPRIL 10 MG/1
10 TABLET ORAL DAILY
Qty: 90 TABLET | Refills: 1 | Status: SHIPPED | OUTPATIENT
Start: 2022-09-29 | End: 2022-10-20

## 2022-09-29 NOTE — PROGRESS NOTES
"  Assessment & Plan     Benign essential hypertension  Stable and doing well on lisinopril.   - lisinopril (ZESTRIL) 10 MG tablet; Take 1 tablet (10 mg) by mouth daily    [id number]}         Return in about 6 months (around 3/29/2023) for BP Recheck.    Ramona Ann Aaseby-Aguilera, PA-C [facility] TAWANNA Walters is a 61 year old, presenting for the following health issues:  Hypertension      History of Present Illness       Hypertension: She presents for follow up of hypertension.  She does check blood pressure  regularly outside of the clinic. Outside blood pressures have been over 140/90. She does not follow a low salt diet.               Review of Systems   Constitutional, HEENT, cardiovascular, pulmonary, gi and gu systems are negative, except as otherwise noted.      Objective    /68   Pulse 83   Temp 98.1  F (36.7  C) (Oral)   Resp 16   Ht 1.664 m (5' 5.5\")   Wt 91.6 kg (202 lb)   LMP 09/14/2013   SpO2 99%   BMI 33.10 kg/m    Body mass index is 33.1 kg/m .  Physical Exam   GENERAL: healthy, alert and no distress  HENT: ear canals and TM's normal, nose and mouth without ulcers or lesions  RESP: lungs clear to auscultation - no rales, rhonchi or wheezes  CV: regular rate and rhythm, normal S1 S2, no S3 or S4, no murmur, click or rub, no peripheral edema and peripheral pulses strong  MS: no gross musculoskeletal defects noted, no edema                    "

## 2022-10-21 ENCOUNTER — OFFICE VISIT (OUTPATIENT)
Dept: URGENT CARE | Facility: URGENT CARE | Age: 62
End: 2022-10-21
Payer: COMMERCIAL

## 2022-10-21 VITALS
TEMPERATURE: 97.6 F | OXYGEN SATURATION: 98 % | DIASTOLIC BLOOD PRESSURE: 70 MMHG | SYSTOLIC BLOOD PRESSURE: 132 MMHG | HEART RATE: 62 BPM | WEIGHT: 202 LBS | BODY MASS INDEX: 33.1 KG/M2

## 2022-10-21 DIAGNOSIS — R05.2 SUBACUTE COUGH: Primary | ICD-10-CM

## 2022-10-21 PROCEDURE — 99213 OFFICE O/P EST LOW 20 MIN: CPT | Performed by: FAMILY MEDICINE

## 2022-10-21 RX ORDER — BENZONATATE 200 MG/1
200 CAPSULE ORAL 3 TIMES DAILY PRN
Qty: 30 CAPSULE | Refills: 0 | Status: SHIPPED | OUTPATIENT
Start: 2022-10-21 | End: 2022-11-04

## 2022-10-21 RX ORDER — OMEPRAZOLE 40 MG/1
40 CAPSULE, DELAYED RELEASE ORAL DAILY
Qty: 14 CAPSULE | Refills: 0 | Status: SHIPPED | OUTPATIENT
Start: 2022-10-21 | End: 2022-11-10

## 2022-10-21 ASSESSMENT — ENCOUNTER SYMPTOMS
FEVER: 0
RHINORRHEA: 0
COUGH: 1
SINUS PAIN: 0
SHORTNESS OF BREATH: 0

## 2022-10-21 NOTE — PROGRESS NOTES
Assessment & Plan     Subacute cough  Possibly related to lisinopril, patient's PCP already switched to [facility], additionally has been seeing her grandkids of viral bronchitis possible.  History also suspicious for some GERD, recommend starting Tessalon for possible bronchitis and Prilosec for suspected GERD.  Reassured patient no evidence of pneumonia.  - benzonatate (TESSALON) 200 MG capsule  Dispense: 30 capsule; Refill: 0  - omeprazole (PRILOSEC) 40 MG DR capsule  Dispense: 14 capsule; Refill: 0        Return in about 2 weeks (around 11/4/2022) for If symptoms do not improve or gets worse..    Akbar Miller MD  [facility] [place] 61 year [place], presenting for the following health issues:  Cough (Patient is a 61 yr old female, who presents with a cough. Patient had a cough before she was on lisinopril, but reports that cough got worse after starting it in Sept. Patient has now been off medication for 10 days and cough has improved but still interfering with daily activities.)      HPI     Patient is a very pleasant 61-year-old female who presents to urgent care with ongoing cough, been improving since discontinue lisinopril and starting losartan.  Cough productive of clear and white sputum.  Admits to heartburn, no difficulty breathing or shortness of breath.  Non-smoker.     Review of Systems   Constitutional: Negative for fever.   HENT: Negative for rhinorrhea and sinus pain.    Respiratory: Positive for cough. Negative for shortness of breath.             Objective    /70 (BP Location: Right arm, Patient Position: Chair, Cuff Size: Adult Regular)   Pulse 62   Temp 97.6  F (36.4  C) (Tympanic)   Wt 91.6 kg (202 lb)   LMP 09/14/2013   SpO2 98%   BMI 33.10 kg/m    Body mass index is 33.1 kg/m .  Physical Exam  Vitals reviewed.   Constitutional:       Appearance: Normal appearance.   HENT:      Nose: Nose normal. No congestion or rhinorrhea.   Cardiovascular:       Rate and Rhythm: Normal rate and regular rhythm.      Pulses: Normal pulses.      Heart sounds: Normal heart sounds.   Pulmonary:      Effort: Pulmonary effort is normal.      Breath sounds: Normal breath sounds.   Musculoskeletal:      Cervical back: Normal range of motion.   Lymphadenopathy:      Cervical: No cervical adenopathy.   Neurological:      Mental Status: She is alert.

## 2022-10-26 ENCOUNTER — ALLIED HEALTH/NURSE VISIT (OUTPATIENT)
Dept: FAMILY MEDICINE | Facility: CLINIC | Age: 62
End: 2022-10-26
Payer: COMMERCIAL

## 2022-10-26 VITALS — DIASTOLIC BLOOD PRESSURE: 66 MMHG | SYSTOLIC BLOOD PRESSURE: 128 MMHG

## 2022-10-26 DIAGNOSIS — I10 BENIGN ESSENTIAL HYPERTENSION: Primary | ICD-10-CM

## 2022-10-26 PROCEDURE — 99207 PR NO CHARGE NURSE ONLY: CPT

## 2022-11-04 ENCOUNTER — OFFICE VISIT (OUTPATIENT)
Dept: URGENT CARE | Facility: URGENT CARE | Age: 62
End: 2022-11-04
Payer: COMMERCIAL

## 2022-11-04 ENCOUNTER — ANCILLARY PROCEDURE (OUTPATIENT)
Dept: GENERAL RADIOLOGY | Facility: CLINIC | Age: 62
End: 2022-11-04
Attending: FAMILY MEDICINE
Payer: COMMERCIAL

## 2022-11-04 VITALS
OXYGEN SATURATION: 96 % | DIASTOLIC BLOOD PRESSURE: 74 MMHG | TEMPERATURE: 98.5 F | RESPIRATION RATE: 14 BRPM | HEART RATE: 72 BPM | SYSTOLIC BLOOD PRESSURE: 122 MMHG | BODY MASS INDEX: 33.1 KG/M2 | WEIGHT: 202 LBS

## 2022-11-04 DIAGNOSIS — J98.01 BRONCHOSPASM: ICD-10-CM

## 2022-11-04 DIAGNOSIS — R05.1 ACUTE COUGH: Primary | ICD-10-CM

## 2022-11-04 DIAGNOSIS — R05.1 ACUTE COUGH: ICD-10-CM

## 2022-11-04 DIAGNOSIS — J22 LOWER RESPIRATORY TRACT INFECTION: ICD-10-CM

## 2022-11-04 DIAGNOSIS — J98.01 BRONCHOSPASM: Primary | ICD-10-CM

## 2022-11-04 PROCEDURE — 71046 X-RAY EXAM CHEST 2 VIEWS: CPT | Mod: TC | Performed by: RADIOLOGY

## 2022-11-04 PROCEDURE — 99214 OFFICE O/P EST MOD 30 MIN: CPT | Performed by: FAMILY MEDICINE

## 2022-11-04 RX ORDER — ALBUTEROL SULFATE 90 UG/1
2 AEROSOL, METERED RESPIRATORY (INHALATION) EVERY 6 HOURS PRN
Qty: 18 G | Refills: 0 | Status: SHIPPED | OUTPATIENT
Start: 2022-11-04 | End: 2022-11-10

## 2022-11-04 RX ORDER — LEVALBUTEROL TARTRATE 45 UG/1
AEROSOL, METERED ORAL
Refills: 0 | OUTPATIENT
Start: 2022-11-04

## 2022-11-04 RX ORDER — LEVALBUTEROL TARTRATE 45 UG/1
2 AEROSOL, METERED ORAL EVERY 4 HOURS PRN
Qty: 15 G | Refills: 0 | Status: SHIPPED | OUTPATIENT
Start: 2022-11-04 | End: 2022-11-17

## 2022-11-04 RX ORDER — AZITHROMYCIN 250 MG/1
TABLET, FILM COATED ORAL
Qty: 6 TABLET | Refills: 0 | Status: SHIPPED | OUTPATIENT
Start: 2022-11-04 | End: 2022-11-10

## 2022-11-04 RX ORDER — CODEINE PHOSPHATE AND GUAIFENESIN 10; 100 MG/5ML; MG/5ML
2 SOLUTION ORAL EVERY 6 HOURS PRN
Qty: 118 ML | Refills: 0 | Status: SHIPPED | OUTPATIENT
Start: 2022-11-04 | End: 2022-11-10

## 2022-11-04 NOTE — TELEPHONE ENCOUNTER
PT calls stating the inhaler prescribed by the [facility] today is not covered by insurance.   She states that the Levalbulterol inhaler is covered.     Pended Rx. Please fax this RX to pharmacy instead.

## 2022-11-04 NOTE — PROGRESS NOTES
SUBJECTIVE:   Selena Saenz is a 61 year old female presenting with a chief complaint of cough.   Endorsed wheezing and congestion.    Onset of symptoms was 8 day(s) ago.  Course of illness is worsening.    Severity moderate  Current and Associated symptoms: cough, fatigue, poor sleep, wheezing  Treatment measures tried include Tylenol/Ibuprofen, OTC Cough med, Fluids and Rest.  Predisposing factors include ill contact: Family member .    Completed COVID vaccinations, boosted  Cough has been since August,  Grandson has been sick with URI symptoms    Home rapid COVID test negative yesterday    Had COVID in April/May    Past Medical History:   Diagnosis Date     Hyperlipidaemia      Hypothyroidism      Current Outpatient Medications   Medication Sig Dispense Refill     aspirin 81 MG EC tablet Take 81 mg by mouth daily       atorvastatin (LIPITOR) 10 MG tablet TAKE 0.5 TABLET BY MOUTH ONCE DAILY 45 tablet 3     Calcium Carbonate-Vitamin D (CALCIUM 500 + D) 500-400 MG-UNIT TABS Take  by mouth daily.       Coenzyme Q10 (COQ-10) 100 MG CAPS        levothyroxine (SYNTHROID/LEVOTHROID) 75 MCG tablet Take 1 tablet (75 mcg) by mouth daily 90 tablet 3     Multiple Vitamin (DAILY MULTIVITAMIN PO) Take  by mouth daily.       omeprazole (PRILOSEC) 40 MG DR capsule Take 1 capsule (40 mg) by mouth daily 14 capsule 0     valsartan (DIOVAN) 40 MG tablet Take 1 tablet (40 mg) by mouth daily 30 tablet 2     benzonatate (TESSALON) 200 MG capsule Take 1 capsule (200 mg) by mouth 3 times daily as needed for cough (Patient not taking: Reported on 11/4/2022) 30 capsule 0     Social History     Tobacco Use     Smoking status: Never     Smokeless tobacco: Never   Substance Use Topics     Alcohol use: Yes     Comment: 2-3 glasses of wine weekly       ROS:  Review of systems negative except as stated above.    OBJECTIVE:  /74 (BP Location: Right arm, Patient Position: Chair, Cuff Size: Adult Regular)   Pulse 72   Temp 98.5  F (36.9   C) (Oral)   Resp 14   Wt 91.6 kg (202 lb)   LMP 09/14/2013   SpO2 96%   BMI 33.10 kg/m    GENERAL APPEARANCE: healthy, alert and no distress  EYES: EOMI,  PERRL, conjunctiva clear  RESP: lungs with basilar rhonchi, few wheezes  CV: regular rates and rhythm, normal S1 S2, no murmur noted  PSYCH: mentation appears normal and affect normal/bright    CXR - no acute infiltrate, no pleural effusion, no pneumothorax personally viewed by me      ASSESSMENT/PLAN:  (R05.1) Acute cough  (primary encounter diagnosis)  Plan: XR Chest 2 Views, guaiFENesin-codeine         (ROBITUSSIN AC) 100-10 MG/5ML solution            (J98.01) Bronchospasm  Plan: albuterol (PROAIR HFA/PROVENTIL HFA/VENTOLIN         HFA) 108 (90 Base) MCG/ACT inhaler            (J22) Lower respiratory tract infection  Plan: azithromycin (ZITHROMAX) 250 MG tablet            Reassurance given, reviewed symptomatic treatment with tylenol, ibuprofen, plenty of fluids and rest.  RX Zpak given for prolong symptoms for empiric coverage for bacterial etiology for lower respiratory tract infection.  RX dillon AC given to help with cough, RX albuterol inhaler given to help with bronchospasm symptoms.  Will follow up on formal Xray report and notify if any abnormalities.    Follow up with primary provider if no improvement of symptoms in 1 week    Gama Russo MD  November 4, 2022 11:02 AM

## 2022-11-08 ENCOUNTER — ALLIED HEALTH/NURSE VISIT (OUTPATIENT)
Dept: FAMILY MEDICINE | Facility: CLINIC | Age: 62
End: 2022-11-08
Payer: COMMERCIAL

## 2022-11-08 VITALS — DIASTOLIC BLOOD PRESSURE: 60 MMHG | SYSTOLIC BLOOD PRESSURE: 130 MMHG | HEART RATE: 72 BPM | RESPIRATION RATE: 16 BRPM

## 2022-11-08 DIAGNOSIS — Z01.30 BLOOD PRESSURE CHECK: Primary | ICD-10-CM

## 2022-11-08 PROCEDURE — 99207 PR NO CHARGE NURSE ONLY: CPT

## 2022-11-08 NOTE — PROGRESS NOTES
Selena Saenz is a 61 year old year old patient who comes in today for a Blood Pressure check because of new medication, Valsartan started 10/20/2022.  Vital Signs as repeated by /60 twice.   Patient is taking medication as prescribed  Patient is tolerating medications well.  Patient is monitoring Blood Pressure at home.  Average readings if yes are 181/92 thinks this was not accurate, but otherwise running. 118/58. 138/72.  Current complaints: cough recovering   Disposition:  Discharged to home, routing encounter to provider. Patient has follow up appointment in 2 days with Ramona Aaseby-Aguilera, PA-C.    Traci Donald R.N.

## 2022-11-10 ENCOUNTER — OFFICE VISIT (OUTPATIENT)
Dept: FAMILY MEDICINE | Facility: CLINIC | Age: 62
End: 2022-11-10
Payer: COMMERCIAL

## 2022-11-10 VITALS
RESPIRATION RATE: 18 BRPM | HEIGHT: 66 IN | HEART RATE: 71 BPM | BODY MASS INDEX: 32.47 KG/M2 | TEMPERATURE: 99.1 F | OXYGEN SATURATION: 97 % | DIASTOLIC BLOOD PRESSURE: 64 MMHG | WEIGHT: 202 LBS | SYSTOLIC BLOOD PRESSURE: 124 MMHG

## 2022-11-10 DIAGNOSIS — I10 BENIGN ESSENTIAL HYPERTENSION: Primary | ICD-10-CM

## 2022-11-10 DIAGNOSIS — J98.01 BRONCHOSPASM: ICD-10-CM

## 2022-11-10 PROCEDURE — 99213 OFFICE O/P EST LOW 20 MIN: CPT | Performed by: PHYSICIAN ASSISTANT

## 2022-11-10 RX ORDER — VALSARTAN 40 MG/1
40 TABLET ORAL DAILY
Qty: 90 TABLET | Refills: 1 | Status: SHIPPED | OUTPATIENT
Start: 2022-11-10 | End: 2023-02-03

## 2022-11-10 RX ORDER — ALBUTEROL SULFATE 90 UG/1
2 AEROSOL, METERED RESPIRATORY (INHALATION) EVERY 6 HOURS PRN
Qty: 18 G | Refills: 0 | Status: SHIPPED | OUTPATIENT
Start: 2022-11-10 | End: 2023-08-03

## 2022-11-10 NOTE — PROGRESS NOTES
Assessment & Plan     Benign essential hypertension  Well controlled.    - valsartan (DIOVAN) 40 MG tablet; Take 1 tablet (40 mg) by mouth daily    Bronchospasm  Pt request   - albuterol (PROAIR HFA/PROVENTIL HFA/VENTOLIN HFA) 108 (90 Base) MCG/ACT inhaler; Inhale 2 puffs into the lungs every 6 hours as needed for shortness of breath / dyspnea or wheezing             No follow-ups on file.    Ramona Ann Aaseby-Aguilera, PA-C  [facility] is a 61 year old, presenting for the following health issues:  Hypertension    Was on lisinopril for HTN  and developed a cough so was switched to valsartan. Tolerating well.     History of Present Illness       Hypertension: She presents for follow up of hypertension.  She does check blood pressure  regularly outside of the clinic. Outside blood pressures have been over 140/90. She does not follow a low salt diet.             Review of Systems   Constitutional, HEENT, cardiovascular, pulmonary, gi and gu systems are negative, except as otherwise noted.      Objective    LMP 09/14/2013   There is no height or weight on file to calculate BMI.  Physical Exam   GENERAL: healthy, alert and no distress  HENT: ear canals and TM's normal, nose and mouth without ulcers or lesions  NECK: no adenopathy, no asymmetry, masses, or scars and thyroid normal to palpation  RESP: lungs clear to auscultation - no rales, rhonchi or wheezes  CV: regular rate and rhythm, normal S1 S2, no S3 or S4, no murmur, click or rub, no peripheral edema and peripheral pulses strong  MS: no gross musculoskeletal defects noted, no edema

## 2022-11-16 ENCOUNTER — MYC MEDICAL ADVICE (OUTPATIENT)
Dept: FAMILY MEDICINE | Facility: CLINIC | Age: 62
End: 2022-11-16

## 2022-11-16 DIAGNOSIS — J98.01 BRONCHOSPASM: ICD-10-CM

## 2022-11-16 NOTE — TELEPHONE ENCOUNTER
See mychart     OK for change in RX? If so please update med list and delete albuterol     Radha Levy, RN

## 2022-11-17 RX ORDER — LEVALBUTEROL TARTRATE 45 UG/1
2 AEROSOL, METERED ORAL EVERY 4 HOURS PRN
Qty: 15 G | Refills: 0 | Status: SHIPPED | OUTPATIENT
Start: 2022-11-17 | End: 2023-08-03

## 2023-02-09 ENCOUNTER — MEDICAL CORRESPONDENCE (OUTPATIENT)
Dept: HEALTH INFORMATION MANAGEMENT | Facility: CLINIC | Age: 63
End: 2023-02-09

## 2023-05-19 ENCOUNTER — HOSPITAL ENCOUNTER (OUTPATIENT)
Dept: MAMMOGRAPHY | Facility: CLINIC | Age: 63
Discharge: HOME | End: 2023-05-19
Attending: PHYSICIAN ASSISTANT | Admitting: PHYSICIAN ASSISTANT
Payer: COMMERCIAL

## 2023-05-19 DIAGNOSIS — Z12.31 VISIT FOR SCREENING MAMMOGRAM: ICD-10-CM

## 2023-05-19 PROCEDURE — 77067 SCR MAMMO BI INCL CAD: CPT

## 2023-07-29 SDOH — ECONOMIC STABILITY: INCOME INSECURITY: HOW HARD IS IT FOR YOU TO PAY FOR THE VERY BASICS LIKE FOOD, HOUSING, MEDICAL CARE, AND HEATING?: NOT HARD AT ALL

## 2023-07-29 SDOH — ECONOMIC STABILITY: INCOME INSECURITY: IN THE LAST 12 MONTHS, WAS THERE A TIME WHEN YOU WERE NOT ABLE TO PAY THE MORTGAGE OR RENT ON TIME?: NO

## 2023-07-29 SDOH — HEALTH STABILITY: PHYSICAL HEALTH: ON AVERAGE, HOW MANY MINUTES DO YOU ENGAGE IN EXERCISE AT THIS LEVEL?: 30 MIN

## 2023-07-29 SDOH — HEALTH STABILITY: PHYSICAL HEALTH: ON AVERAGE, HOW MANY DAYS PER WEEK DO YOU ENGAGE IN MODERATE TO STRENUOUS EXERCISE (LIKE A BRISK WALK)?: 2 DAYS

## 2023-07-29 SDOH — ECONOMIC STABILITY: FOOD INSECURITY: WITHIN THE PAST 12 MONTHS, THE FOOD YOU BOUGHT JUST DIDN'T LAST AND YOU DIDN'T HAVE MONEY TO GET MORE.: NEVER TRUE

## 2023-07-29 SDOH — ECONOMIC STABILITY: FOOD INSECURITY: WITHIN THE PAST 12 MONTHS, YOU WORRIED THAT YOUR FOOD WOULD RUN OUT BEFORE YOU GOT MONEY TO BUY MORE.: NEVER TRUE

## 2023-07-29 ASSESSMENT — ENCOUNTER SYMPTOMS
HEARTBURN: 0
WEAKNESS: 0
PALPITATIONS: 0
HEMATOCHEZIA: 0
SORE THROAT: 0
SHORTNESS OF BREATH: 0
CONSTIPATION: 0
FREQUENCY: 0
BREAST MASS: 0
EYE PAIN: 0
COUGH: 0
PARESTHESIAS: 0
HEMATURIA: 0
JOINT SWELLING: 0
NAUSEA: 0
FEVER: 0
DIZZINESS: 0
DIARRHEA: 0
DYSURIA: 0
MYALGIAS: 0
ABDOMINAL PAIN: 0
NERVOUS/ANXIOUS: 1
ARTHRALGIAS: 0
HEADACHES: 0
CHILLS: 0

## 2023-07-29 ASSESSMENT — LIFESTYLE VARIABLES
SKIP TO QUESTIONS 9-10: 1
HOW OFTEN DO YOU HAVE A DRINK CONTAINING ALCOHOL: 2-4 TIMES A MONTH
HOW OFTEN DO YOU HAVE SIX OR MORE DRINKS ON ONE OCCASION: NEVER
AUDIT-C TOTAL SCORE: 2
HOW MANY STANDARD DRINKS CONTAINING ALCOHOL DO YOU HAVE ON A TYPICAL DAY: 1 OR 2

## 2023-07-29 ASSESSMENT — SOCIAL DETERMINANTS OF HEALTH (SDOH)
DO YOU BELONG TO ANY CLUBS OR ORGANIZATIONS SUCH AS CHURCH GROUPS UNIONS, FRATERNAL OR ATHLETIC GROUPS, OR SCHOOL GROUPS?: NO
HOW OFTEN DO YOU GET TOGETHER WITH FRIENDS OR RELATIVES?: ONCE A WEEK
HOW OFTEN DO YOU ATTEND CHURCH OR RELIGIOUS SERVICES?: 1 TO 4 TIMES PER YEAR
IN A TYPICAL WEEK, HOW MANY TIMES DO YOU TALK ON THE PHONE WITH FAMILY, FRIENDS, OR NEIGHBORS?: MORE THAN THREE TIMES A WEEK

## 2023-08-03 ENCOUNTER — OFFICE VISIT (OUTPATIENT)
Dept: FAMILY MEDICINE | Facility: CLINIC | Age: 63
End: 2023-08-03
Payer: COMMERCIAL

## 2023-08-03 VITALS
OXYGEN SATURATION: 99 % | BODY MASS INDEX: 32.13 KG/M2 | TEMPERATURE: 97.8 F | WEIGHT: 199.9 LBS | SYSTOLIC BLOOD PRESSURE: 118 MMHG | DIASTOLIC BLOOD PRESSURE: 68 MMHG | HEIGHT: 66 IN | RESPIRATION RATE: 18 BRPM | HEART RATE: 61 BPM

## 2023-08-03 DIAGNOSIS — F41.9 ANXIETY: ICD-10-CM

## 2023-08-03 DIAGNOSIS — Z00.00 ROUTINE GENERAL MEDICAL EXAMINATION AT A HEALTH CARE FACILITY: Primary | ICD-10-CM

## 2023-08-03 DIAGNOSIS — E78.5 HYPERLIPIDEMIA LDL GOAL <130: ICD-10-CM

## 2023-08-03 DIAGNOSIS — Z13.220 SCREENING FOR HYPERLIPIDEMIA: ICD-10-CM

## 2023-08-03 DIAGNOSIS — Z13.0 SCREENING FOR BLOOD DISEASE: ICD-10-CM

## 2023-08-03 DIAGNOSIS — N18.30 STAGE 3 CHRONIC KIDNEY DISEASE, UNSPECIFIED WHETHER STAGE 3A OR 3B CKD (H): ICD-10-CM

## 2023-08-03 DIAGNOSIS — E03.9 ACQUIRED HYPOTHYROIDISM: ICD-10-CM

## 2023-08-03 DIAGNOSIS — F51.01 PRIMARY INSOMNIA: ICD-10-CM

## 2023-08-03 DIAGNOSIS — I10 BENIGN ESSENTIAL HYPERTENSION: ICD-10-CM

## 2023-08-03 DIAGNOSIS — Z78.0 POST-MENOPAUSAL: ICD-10-CM

## 2023-08-03 LAB
ALBUMIN SERPL BCG-MCNC: 4.5 G/DL (ref 3.5–5.2)
ALP SERPL-CCNC: 91 U/L (ref 35–104)
ALT SERPL W P-5'-P-CCNC: 46 U/L (ref 0–50)
ANION GAP SERPL CALCULATED.3IONS-SCNC: 9 MMOL/L (ref 7–15)
AST SERPL W P-5'-P-CCNC: 42 U/L (ref 0–45)
BILIRUB SERPL-MCNC: 0.5 MG/DL
BUN SERPL-MCNC: 12.9 MG/DL (ref 8–23)
CALCIUM SERPL-MCNC: 9.6 MG/DL (ref 8.8–10.2)
CHLORIDE SERPL-SCNC: 104 MMOL/L (ref 98–107)
CHOLEST SERPL-MCNC: 177 MG/DL
CREAT SERPL-MCNC: 0.97 MG/DL (ref 0.51–0.95)
CREAT UR-MCNC: 19.8 MG/DL
DEPRECATED HCO3 PLAS-SCNC: 27 MMOL/L (ref 22–29)
ERYTHROCYTE [DISTWIDTH] IN BLOOD BY AUTOMATED COUNT: 13 % (ref 10–15)
GFR SERPL CREATININE-BSD FRML MDRD: 65 ML/MIN/1.73M2
GLUCOSE SERPL-MCNC: 98 MG/DL (ref 70–99)
HCT VFR BLD AUTO: 43.2 % (ref 35–47)
HDLC SERPL-MCNC: 60 MG/DL
HGB BLD-MCNC: 14 G/DL (ref 11.7–15.7)
LDLC SERPL CALC-MCNC: 100 MG/DL
MCH RBC QN AUTO: 28.3 PG (ref 26.5–33)
MCHC RBC AUTO-ENTMCNC: 32.4 G/DL (ref 31.5–36.5)
MCV RBC AUTO: 87 FL (ref 78–100)
MICROALBUMIN UR-MCNC: <12 MG/L
MICROALBUMIN/CREAT UR: NORMAL MG/G{CREAT}
NONHDLC SERPL-MCNC: 117 MG/DL
PLATELET # BLD AUTO: 260 10E3/UL (ref 150–450)
POTASSIUM SERPL-SCNC: 4.5 MMOL/L (ref 3.4–5.3)
PROT SERPL-MCNC: 7.7 G/DL (ref 6.4–8.3)
RBC # BLD AUTO: 4.94 10E6/UL (ref 3.8–5.2)
SODIUM SERPL-SCNC: 140 MMOL/L (ref 136–145)
TRIGL SERPL-MCNC: 84 MG/DL
TSH SERPL DL<=0.005 MIU/L-ACNC: 1.45 UIU/ML (ref 0.3–4.2)
WBC # BLD AUTO: 6.8 10E3/UL (ref 4–11)

## 2023-08-03 PROCEDURE — 99396 PREV VISIT EST AGE 40-64: CPT | Performed by: PHYSICIAN ASSISTANT

## 2023-08-03 PROCEDURE — 82043 UR ALBUMIN QUANTITATIVE: CPT | Performed by: PHYSICIAN ASSISTANT

## 2023-08-03 PROCEDURE — 80061 LIPID PANEL: CPT | Performed by: PHYSICIAN ASSISTANT

## 2023-08-03 PROCEDURE — 36415 COLL VENOUS BLD VENIPUNCTURE: CPT | Performed by: PHYSICIAN ASSISTANT

## 2023-08-03 PROCEDURE — 99214 OFFICE O/P EST MOD 30 MIN: CPT | Mod: 25 | Performed by: PHYSICIAN ASSISTANT

## 2023-08-03 PROCEDURE — 85027 COMPLETE CBC AUTOMATED: CPT | Performed by: PHYSICIAN ASSISTANT

## 2023-08-03 PROCEDURE — 82570 ASSAY OF URINE CREATININE: CPT | Performed by: PHYSICIAN ASSISTANT

## 2023-08-03 PROCEDURE — 84443 ASSAY THYROID STIM HORMONE: CPT | Performed by: PHYSICIAN ASSISTANT

## 2023-08-03 PROCEDURE — 80053 COMPREHEN METABOLIC PANEL: CPT | Performed by: PHYSICIAN ASSISTANT

## 2023-08-03 RX ORDER — ZOLPIDEM TARTRATE 5 MG/1
5 TABLET ORAL
Qty: 30 TABLET | Refills: 1 | Status: SHIPPED | OUTPATIENT
Start: 2023-08-03 | End: 2023-12-04

## 2023-08-03 RX ORDER — VALSARTAN 40 MG/1
40 TABLET ORAL DAILY
Qty: 90 TABLET | Refills: 1 | Status: SHIPPED | OUTPATIENT
Start: 2023-08-03 | End: 2024-02-14

## 2023-08-03 RX ORDER — ESCITALOPRAM OXALATE 5 MG/1
5 TABLET ORAL DAILY
Qty: 30 TABLET | Refills: 1 | Status: SHIPPED | OUTPATIENT
Start: 2023-08-03 | End: 2023-09-08

## 2023-08-03 RX ORDER — ATORVASTATIN CALCIUM 10 MG/1
TABLET, FILM COATED ORAL
Qty: 45 TABLET | Refills: 1 | Status: SHIPPED | OUTPATIENT
Start: 2023-08-03 | End: 2024-03-18

## 2023-08-03 RX ORDER — LEVOTHYROXINE SODIUM 75 UG/1
75 TABLET ORAL DAILY
Qty: 90 TABLET | Refills: 1 | Status: SHIPPED | OUTPATIENT
Start: 2023-08-03

## 2023-08-03 ASSESSMENT — ENCOUNTER SYMPTOMS
JOINT SWELLING: 0
HEARTBURN: 0
PARESTHESIAS: 0
ABDOMINAL PAIN: 0
NAUSEA: 0
FREQUENCY: 0
CHILLS: 0
ARTHRALGIAS: 0
HEMATOCHEZIA: 0
PALPITATIONS: 0
FEVER: 0
DYSURIA: 0
MYALGIAS: 0
SORE THROAT: 0
NERVOUS/ANXIOUS: 1
DIZZINESS: 0
BREAST MASS: 0
EYE PAIN: 0
CONSTIPATION: 0
SHORTNESS OF BREATH: 0
WEAKNESS: 0
HEADACHES: 0
HEMATURIA: 0
COUGH: 0
DIARRHEA: 0

## 2023-08-03 NOTE — PROGRESS NOTES
SUBJECTIVE:   CC: Selena is an 62 year old who presents for preventive health visit.       8/3/2023     6:55 AM   Additional Questions   Roomed by Selena       Healthy Habits:     Getting at least 3 servings of Calcium per day:  Yes    Bi-annual eye exam:  Yes    Dental care twice a year:  NO    Sleep apnea or symptoms of sleep apnea:  None    Diet:  Regular (no restrictions)    Frequency of exercise:  2-3 days/week    Duration of exercise:  15-30 minutes    Taking medications regularly:  Yes    Medication side effects:  Not applicable    Additional concerns today:  Yes    Having issues sleeping  Needs help managing stress levels    Today's PHQ-2 Score:       8/3/2023     6:48 AM   PHQ-2 ( 1999 [employer])   Q1: Little interest or pleasure in doing things 1   Q2: Feeling down, depressed or hopeless 1   PHQ-2 Score 2   Q1: Little interest or pleasure in doing things Several days   Q2: Feeling down, depressed or hopeless Several days   PHQ-2 Score 2               Social History     Tobacco Use    Smoking status: Never    Smokeless tobacco: Never   Substance Use Topics    Alcohol use: Yes     Comment: 2-3 glasses of wine weekly             7/29/2023     2:49 PM   Alcohol Use   Prescreen: >3 drinks/day or >7 drinks/week? No          No data to display              Reviewed orders with patient.  Reviewed health maintenance and updated orders accordingly - Yes  BP Readings from Last 3 Encounters:   08/03/23 118/68   11/10/22 124/64   11/08/22 130/60    Wt Readings from Last 3 Encounters:   08/03/23 90.7 kg (199 lb 14.4 oz)   11/10/22 91.6 kg (202 lb)   11/04/22 91.6 kg (202 lb)                  Patient Active Problem List   Diagnosis    Family history of early CAD    Hyperlipidemia LDL goal <130    History of colonic polyps    Tubular adenoma    Ovarian cyst    Acquired hypothyroidism    Non morbid obesity, unspecified obesity type    CKD (chronic kidney disease) stage 3, GFR 30-59 ml/min (H)     Past Surgical History:    Procedure Laterality Date    BIOPSY  2009    Screen for skin cancer. Benign.    COLONOSCOPY  2014    COLONOSCOPY Left 8/28/2020    Procedure: COLONOSCOPY;  Surgeon: Wilmer Porter MD;  Location:  GI    EYE SURGERY  1999    LAS    ORTHOPEDIC SURGERY      broken leg- 2/2001    ORTHOPEDIC SURGERY  2001    Tibia and fibula fracture. Karolyn, plate and screws in left leg    SURGICAL HISTORY OF -   2001    left lower leg surgery-has metal karolyn and scres there       Social History     Tobacco Use    Smoking status: Never    Smokeless tobacco: Never   Substance Use Topics    Alcohol use: Yes     Comment: 2-3 glasses of wine weekly     Family History   Problem Relation Age of Onset    Heart Disease Mother         pacemaker    Coronary Artery Disease Mother     Hypertension Mother     Hyperlipidemia Mother     C.A.D. Father         MI-2 times, had cardiac arrest at age of 70, first heart attack at age of 51    Cerebrovascular Disease Father     Coronary Artery Disease Father     Hyperlipidemia Father     Other Cancer Father         Melanoma    Diabetes Father         type 2    Thyroid Disease Sister         one sis with thyroid issues, not cancer    Coronary Artery Disease Sister     Osteoporosis Sister     Obesity Sister     C.A.D. Brother         MI-one, at age of 48-defibrillator in place now , smoker in past     Respiratory Brother     Coronary Artery Disease Brother     C.A.D. Sister         2-morbid obesity , smoker, MI at age of late 50's    Diabetes Sister     Coronary Artery Disease Sister     Thyroid Disease Sister     Obesity Sister     Thyroid Disease Sister     Osteoporosis Sister     Coronary Artery Disease Sister     Osteoporosis Sister     Thyroid Disease Sister     Hypertension Sister     Osteoporosis Sister     Thyroid Disease Sister     Breast Cancer Other         maternal aunt at age of 70's    Asthma Daughter     Obesity Sister     Breast Cancer Other     Depression Daughter     Anxiety Disorder  Daughter     Asthma Daughter     Cancer - colorectal No family hx of     Colon Cancer No family hx of     Ovarian Cancer No family hx of          Current Outpatient Medications   Medication Sig Dispense Refill    aspirin 81 MG EC tablet Take 81 mg by mouth daily      atorvastatin (LIPITOR) 10 MG tablet TAKE 0.5 TABLET BY MOUTH ONCE DAILY 45 tablet 1    Calcium Carbonate-Vitamin D (CALCIUM 500 + D) 500-400 MG-UNIT TABS Take  by mouth daily.      Coenzyme Q10 (COQ-10) 100 MG CAPS       escitalopram (LEXAPRO) 5 MG tablet Take 1 tablet (5 mg) by mouth daily 30 tablet 1    levothyroxine (SYNTHROID/LEVOTHROID) 75 MCG tablet Take 1 tablet (75 mcg) by mouth daily 90 tablet 1    Multiple Vitamin (DAILY MULTIVITAMIN PO) Take  by mouth daily.      valsartan (DIOVAN) 40 MG tablet Take 1 tablet (40 mg) by mouth daily 90 tablet 1    zolpidem (AMBIEN) 5 MG tablet Take 1 tablet (5 mg) by mouth nightly as needed for sleep 30 tablet 1     Allergies   Allergen Reactions    Cats     Pollen Extract/Tree Extract      Recent Labs   Lab Test 07/28/22  0744 07/15/21  0837 07/12/20  0923 03/14/19  0902   LDL 77 108* 96 110*   HDL 54 65 51 53   TRIG 120 94 103 93   ALT 56* 76* 50 42   CR 0.83 0.95 0.83 0.88   GFRESTIMATED 79 64 75 71   GFRESTBLACK  --   --  87 83   POTASSIUM 3.9 4.2 4.3 3.9   TSH 2.04 1.66 1.11 2.08        Breast Cancer Screening:    FHS-7:       8/27/2021     8:20 AM 3/14/2022     8:11 AM 7/25/2022     8:16 AM 5/19/2023     9:11 AM 7/29/2023     2:53 PM   Breast CA Risk Assessment (FHS-7)   Did any of your first-degree relatives have breast or ovarian cancer? No No No No No   Did any of your relatives have bilateral breast cancer? No No Unknown No Yes   Did any man in your family have breast cancer? No No No No No   Did any woman in your family have breast and ovarian cancer? No No Unknown No No   Did any woman in your family have breast cancer before age 50 y? No No Unknown No No   Do you have 2 or more relatives with  breast and/or ovarian cancer? No No Unknown No No   Do you have 2 or more relatives with breast and/or bowel cancer? No No Unknown No No     click delete button to remove this line now  Mammogram Screening: Recommended mammography every 1-2 years with patient discussion and risk factor consideration  Pertinent mammograms are reviewed under the imaging tab.    History of abnormal Pap smear: NO - age 30-65 PAP every 5 years with negative HPV co-testing recommended      Latest Ref Rng & Units 7/8/2020     3:30 PM 7/8/2020     2:40 PM 2/8/2016     8:00 AM   PAP / HPV   PAP (Historical)   NIL     HPV 16 DNA NEG^Negative Negative   Negative    HPV 18 DNA NEG^Negative Negative   Negative    Other HR HPV NEG^Negative Negative   Negative      Reviewed and updated as needed this visit by clinical staff   Tobacco  Allergies  Meds              Reviewed and updated as needed this visit by Provider                     Review of Systems   Constitutional:  Negative for chills and fever.   HENT:  Negative for congestion, ear pain, hearing loss and sore throat.    Eyes:  Negative for pain and visual disturbance.   Respiratory:  Negative for cough and shortness of breath.    Cardiovascular:  Negative for chest pain, palpitations and peripheral edema.   Gastrointestinal:  Negative for abdominal pain, constipation, diarrhea, heartburn, hematochezia and nausea.   Breasts:  Negative for tenderness, breast mass and discharge.   Genitourinary:  Negative for dysuria, frequency, genital sores, hematuria, pelvic pain, urgency, vaginal bleeding and vaginal discharge.   Musculoskeletal:  Negative for arthralgias, joint swelling and myalgias.   Skin:  Negative for rash.   Neurological:  Negative for dizziness, weakness, headaches and paresthesias.   Psychiatric/Behavioral:  Negative for mood changes. The patient is nervous/anxious.           OBJECTIVE:   /68   Pulse 61   Temp 97.8  F (36.6  C) (Tympanic)   Resp 18   Ht 1.664 m (5'  "5.5\")   Wt 90.7 kg (199 lb 14.4 oz)   LMP 09/14/2013   SpO2 99%   BMI 32.76 kg/m    Physical Exam  GENERAL APPEARANCE: healthy, alert and no distress  EYES: Eyes grossly normal to inspection, PERRL and conjunctivae and sclerae normal  HENT: ear canals and TM's normal, nose and mouth without ulcers or lesions, oropharynx clear and oral mucous membranes moist  NECK: no adenopathy, no asymmetry, masses, or scars and thyroid normal to palpation  RESP: lungs clear to auscultation - no rales, rhonchi or wheezes  BREAST: normal without masses, tenderness or nipple discharge and no palpable axillary masses or adenopathy  CV: regular rate and rhythm, normal S1 S2, no S3 or S4, no murmur, click or rub, no peripheral edema and peripheral pulses strong  ABDOMEN: soft, nontender, no hepatosplenomegaly, no masses and bowel sounds normal  MS: no musculoskeletal defects are noted and gait is age appropriate without ataxia  SKIN: no suspicious lesions or rashes  NEURO: Normal strength and tone, sensory exam grossly normal, mentation intact and speech normal  PSYCH: mentation appears normal and affect normal/bright    Diagnostic Test Results:  Labs reviewed in Epic    ASSESSMENT/PLAN:   (N18.30) Stage 3 chronic kidney disease, unspecified whether stage 3a or 3b CKD (H)  (primary encounter diagnosis)  Comment:   Plan: COMPREHENSIVE METABOLIC PANEL, Albumin Random         Urine Quantitative with Creat Ratio            (E78.5) Hyperlipidemia LDL goal <130  Comment:   Plan: Lipid panel reflex to direct LDL Non-fasting,         atorvastatin (LIPITOR) 10 MG tablet            (E03.9) Acquired hypothyroidism  Comment:   Plan: TSH WITH FREE T4 REFLEX            (Z78.0) Post-menopausal  Comment:   Plan: DEXA HIP/PELVIS/SPINE - Future            (F51.01) Primary insomnia  Comment: advised to take 1/2 tab when needed before sleep. Risks, benefits, side effects and intended purposes discussed.      Plan: zolpidem (AMBIEN) 5 MG tablet        " "    (F41.9) Anxiety  Comment:  recently diagnosed with advanced gall bladder cancer.   Advised low dose lexapro.  Risks, benefits, side effects and intended purposes discussed.  Follow-up in 4 weeks.     Plan: escitalopram (LEXAPRO) 5 MG tablet            (Z13.0) Screening for blood disease  Comment:   Plan: CBC with platelets            (I10) Benign essential hypertension  Comment:   Plan: valsartan (DIOVAN) 40 MG tablet            (Z13.220) Screening for hyperlipidemia  Comment:   Plan: atorvastatin (LIPITOR) 10 MG tablet            (E03.9) Hypothyroidism, unspecified type  Comment: stable symptosm   Plan: levothyroxine (SYNTHROID/LEVOTHROID) 75 MCG         tablet            Patient has been advised of split billing requirements and indicates understanding: Yes      COUNSELING:  Reviewed preventive health counseling, as reflected in patient instructions      BMI:   Estimated body mass index is 32.76 kg/m  as calculated from the following:    Height as of this encounter: 1.664 m (5' 5.5\").    Weight as of this encounter: 90.7 kg (199 lb 14.4 oz).   Weight management plan: Discussed healthy diet and exercise guidelines      She reports that she has never smoked. She has never used smokeless tobacco.          Ramona Ann Aaseby-Aguilera, PA-C  [facility]  "

## 2023-08-25 ENCOUNTER — ANCILLARY PROCEDURE (OUTPATIENT)
Dept: BONE DENSITY | Facility: CLINIC | Age: 63
End: 2023-08-25
Attending: PHYSICIAN ASSISTANT
Payer: COMMERCIAL

## 2023-08-25 DIAGNOSIS — Z78.0 POST-MENOPAUSAL: ICD-10-CM

## 2023-08-25 PROCEDURE — 77080 DXA BONE DENSITY AXIAL: CPT | Performed by: INTERNAL MEDICINE

## 2023-09-01 ASSESSMENT — ANXIETY QUESTIONNAIRES
3. WORRYING TOO MUCH ABOUT DIFFERENT THINGS: SEVERAL DAYS
4. TROUBLE RELAXING: SEVERAL DAYS
2. NOT BEING ABLE TO STOP OR CONTROL WORRYING: SEVERAL DAYS
GAD7 TOTAL SCORE: 4
7. FEELING AFRAID AS IF SOMETHING AWFUL MIGHT HAPPEN: NOT AT ALL
1. FEELING NERVOUS, ANXIOUS, OR ON EDGE: SEVERAL DAYS
5. BEING SO RESTLESS THAT IT IS HARD TO SIT STILL: NOT AT ALL
6. BECOMING EASILY ANNOYED OR IRRITABLE: NOT AT ALL
IF YOU CHECKED OFF ANY PROBLEMS ON THIS QUESTIONNAIRE, HOW DIFFICULT HAVE THESE PROBLEMS MADE IT FOR YOU TO DO YOUR WORK, TAKE CARE OF THINGS AT HOME, OR GET ALONG WITH OTHER PEOPLE: NOT DIFFICULT AT ALL
GAD7 TOTAL SCORE: 4

## 2023-09-08 ENCOUNTER — VIRTUAL VISIT (OUTPATIENT)
Dept: FAMILY MEDICINE | Facility: CLINIC | Age: 63
End: 2023-09-08
Payer: COMMERCIAL

## 2023-09-08 DIAGNOSIS — F41.9 ANXIETY: ICD-10-CM

## 2023-09-08 PROCEDURE — 99213 OFFICE O/P EST LOW 20 MIN: CPT | Mod: VID | Performed by: PHYSICIAN ASSISTANT

## 2023-09-08 RX ORDER — ESCITALOPRAM OXALATE 5 MG/1
5 TABLET ORAL DAILY
Qty: 90 TABLET | Refills: 3 | Status: SHIPPED | OUTPATIENT
Start: 2023-09-08 | End: 2023-12-02

## 2023-09-08 NOTE — PROGRESS NOTES
Selena is a 62 year old who is being evaluated via a billable video visit.      How would you like to obtain your AVS? MyChart  If the video visit is dropped, the invitation should be resent by: Text to cell phone: [phone]  Will anyone else be joining your video visit? No          Assessment & Plan     Anxiety  Stable symptosm agreed to stay on low dose lexapro and she will reach out if not working as well.  end-stage gall bladder cancer.   - escitalopram (LEXAPRO) 5 MG tablet; Take 1 tablet (5 mg) by mouth daily               Ramona Ann Aaseby-Aguilera, PA-C  [facility]   Selena is a 62 year old, presenting for the following health issues:  Recheck Medication (Lexapro - Patient states it seems to be helping)        9/8/2023     8:33 AM   Additional Questions   Roomed by PACO Calvillo   Accompanied by Self      diagnised with advanced gallbladder  cancer and has been very stressed.  Started on a low dose lexapro 8/3/23 and states she has noticed she can sleep better without dwelling on issues and feels more calm.   History of Present Illness       Mental Health Follow-up:  Patient presents to follow-up on Anxiety.    Patient's anxiety since last visit has been:  Better  The patient is not having other symptoms associated with anxiety.  Any significant life events: health concerns  Patient is not feeling anxious or having panic attacks.  Patient has no concerns about alcohol or drug use.    She eats 2-3 servings of fruits and vegetables daily.She consumes 0 sweetened beverage(s) daily.She exercises with enough effort to increase her heart rate 10 to 19 minutes per day.  She exercises with enough effort to increase her heart rate 3 or less days per week.   She is taking medications regularly.             Review of Systems   Constitutional, HEENT, cardiovascular, pulmonary, gi and gu systems are negative, except as otherwise noted.      Objective    Vitals -  "Patient Reported  Weight (Patient Reported): 89.8 kg (198 lb)  Height (Patient Reported): 165.1 cm (5' 5\")  BMI (Based on Pt Reported Ht/Wt): 32.95  Pain Score: No Pain (0)        Physical Exam   GENERAL: Healthy, alert and no distress  EYES: Eyes grossly normal to inspection.  No discharge or erythema, or obvious scleral/conjunctival abnormalities.  RESP: No audible wheeze, cough, or visible cyanosis.  No visible retractions or increased work of breathing.    SKIN: Visible skin clear. No significant rash, abnormal pigmentation or lesions.  NEURO: Cranial nerves grossly intact.  Mentation and speech appropriate for age.  PSYCH: Mentation appears normal, affect normal/bright, judgement and insight intact, normal speech and appearance well-groomed.                Video-Visit Details    Type of service:  Video Visit     Originating Location (pt. Location): Home    Distant Location (provider location):  Off-site  Platform used for Video Visit: Shamika"

## 2023-09-13 ENCOUNTER — HOSPITAL ENCOUNTER (EMERGENCY)
Facility: CLINIC | Age: 63
Discharge: HOME | End: 2023-09-13
Attending: EMERGENCY MEDICINE | Admitting: EMERGENCY MEDICINE
Payer: COMMERCIAL

## 2023-09-13 ENCOUNTER — MYC MEDICAL ADVICE (OUTPATIENT)
Dept: FAMILY MEDICINE | Facility: CLINIC | Age: 63
End: 2023-09-13

## 2023-09-13 VITALS
OXYGEN SATURATION: 98 % | SYSTOLIC BLOOD PRESSURE: 145 MMHG | RESPIRATION RATE: 18 BRPM | TEMPERATURE: 96.7 F | DIASTOLIC BLOOD PRESSURE: 61 MMHG | HEART RATE: 58 BPM

## 2023-09-13 DIAGNOSIS — R10.11 RUQ PAIN: ICD-10-CM

## 2023-09-13 DIAGNOSIS — I10 HYPERTENSION, UNSPECIFIED TYPE: ICD-10-CM

## 2023-09-13 LAB
ALBUMIN SERPL BCG-MCNC: 4.3 G/DL (ref 3.5–5.2)
ALP SERPL-CCNC: 118 U/L (ref 35–104)
ALT SERPL W P-5'-P-CCNC: 43 U/L (ref 0–50)
ANION GAP SERPL CALCULATED.3IONS-SCNC: 9 MMOL/L (ref 7–15)
AST SERPL W P-5'-P-CCNC: 32 U/L (ref 0–45)
ATRIAL RATE - MUSE: 54 BPM
BASOPHILS # BLD AUTO: 0 10E3/UL (ref 0–0.2)
BASOPHILS NFR BLD AUTO: 1 %
BILIRUB SERPL-MCNC: 0.4 MG/DL
BUN SERPL-MCNC: 16.7 MG/DL (ref 8–23)
CALCIUM SERPL-MCNC: 9.8 MG/DL (ref 8.8–10.2)
CHLORIDE SERPL-SCNC: 103 MMOL/L (ref 98–107)
CREAT SERPL-MCNC: 0.77 MG/DL (ref 0.51–0.95)
DEPRECATED HCO3 PLAS-SCNC: 24 MMOL/L (ref 22–29)
DIASTOLIC BLOOD PRESSURE - MUSE: NORMAL MMHG
EGFRCR SERPLBLD CKD-EPI 2021: 86 ML/MIN/1.73M2
EOSINOPHIL # BLD AUTO: 0.3 10E3/UL (ref 0–0.7)
EOSINOPHIL NFR BLD AUTO: 4 %
ERYTHROCYTE [DISTWIDTH] IN BLOOD BY AUTOMATED COUNT: 13.2 % (ref 10–15)
GLUCOSE SERPL-MCNC: 114 MG/DL (ref 70–99)
HCT VFR BLD AUTO: 41.6 % (ref 35–47)
HGB BLD-MCNC: 13.8 G/DL (ref 11.7–15.7)
IMM GRANULOCYTES # BLD: 0 10E3/UL
IMM GRANULOCYTES NFR BLD: 0 %
INTERPRETATION ECG - MUSE: NORMAL
LIPASE SERPL-CCNC: 49 U/L (ref 13–60)
LYMPHOCYTES # BLD AUTO: 1.8 10E3/UL (ref 0.8–5.3)
LYMPHOCYTES NFR BLD AUTO: 22 %
MCH RBC QN AUTO: 28.5 PG (ref 26.5–33)
MCHC RBC AUTO-ENTMCNC: 33.2 G/DL (ref 31.5–36.5)
MCV RBC AUTO: 86 FL (ref 78–100)
MONOCYTES # BLD AUTO: 0.7 10E3/UL (ref 0–1.3)
MONOCYTES NFR BLD AUTO: 8 %
NEUTROPHILS # BLD AUTO: 5.5 10E3/UL (ref 1.6–8.3)
NEUTROPHILS NFR BLD AUTO: 65 %
NRBC # BLD AUTO: 0 10E3/UL
NRBC BLD AUTO-RTO: 0 /100
P AXIS - MUSE: 63 DEGREES
PLATELET # BLD AUTO: 257 10E3/UL (ref 150–450)
POTASSIUM SERPL-SCNC: 4 MMOL/L (ref 3.4–5.3)
PR INTERVAL - MUSE: 116 MS
PROT SERPL-MCNC: 7.5 G/DL (ref 6.4–8.3)
QRS DURATION - MUSE: 84 MS
QT - MUSE: 450 MS
QTC - MUSE: 426 MS
R AXIS - MUSE: -19 DEGREES
RBC # BLD AUTO: 4.84 10E6/UL (ref 3.8–5.2)
SODIUM SERPL-SCNC: 136 MMOL/L (ref 136–145)
SYSTOLIC BLOOD PRESSURE - MUSE: NORMAL MMHG
T AXIS - MUSE: 29 DEGREES
TROPONIN T SERPL HS-MCNC: 10 NG/L
VENTRICULAR RATE- MUSE: 54 BPM
WBC # BLD AUTO: 8.3 10E3/UL (ref 4–11)

## 2023-09-13 PROCEDURE — 83690 ASSAY OF LIPASE: CPT | Performed by: EMERGENCY MEDICINE

## 2023-09-13 PROCEDURE — 99284 EMERGENCY DEPT VISIT MOD MDM: CPT

## 2023-09-13 PROCEDURE — 36415 COLL VENOUS BLD VENIPUNCTURE: CPT | Performed by: EMERGENCY MEDICINE

## 2023-09-13 PROCEDURE — 80053 COMPREHEN METABOLIC PANEL: CPT | Performed by: EMERGENCY MEDICINE

## 2023-09-13 PROCEDURE — 85025 COMPLETE CBC W/AUTO DIFF WBC: CPT | Performed by: EMERGENCY MEDICINE

## 2023-09-13 PROCEDURE — 93005 ELECTROCARDIOGRAM TRACING: CPT

## 2023-09-13 PROCEDURE — 84484 ASSAY OF TROPONIN QUANT: CPT | Performed by: EMERGENCY MEDICINE

## 2023-09-13 ASSESSMENT — ACTIVITIES OF DAILY LIVING (ADL)
ADLS_ACUITY_SCORE: 33
ADLS_ACUITY_SCORE: 33

## 2023-09-13 NOTE — ED PROVIDER NOTES
History     Chief Complaint:  Hypertension       HPI   Selena Saenz is a 62 year old female with past medical history of hypertension, hyperlipidemia and hypothyroidism who presents to the emergency department with right upper quadrant pain and elevated blood pressure.  Patient reports she had a sudden right upper quadrant pain that is since resolved.  She checked her blood pressure while she was having the pain and blood pressure was over 200 systolic.  This prompted her visit to the emergency department.  Currently she is symptom-free including pain in her right upper quadrant.  She denies any associated nausea, vomiting, diarrhea, fevers or chills.  In regards to her hypertension this is typically well controlled.  She been compliant with her valsartan.  She denies any headache, chest pain, shortness of breath, pain or swelling in her lower extremities.  Further denies any weakness or numbness in her arms or legs or any visual difficulties.    Independent Historian:   None - Patient Only    Medications:    aspirin 81 MG EC tablet  atorvastatin (LIPITOR) 10 MG tablet  Calcium Carbonate-Vitamin D (CALCIUM 500 + D) 500-400 MG-UNIT TABS  Coenzyme Q10 (COQ-10) 100 MG CAPS  escitalopram (LEXAPRO) 5 MG tablet  levothyroxine (SYNTHROID/LEVOTHROID) 75 MCG tablet  Multiple Vitamin (DAILY MULTIVITAMIN PO)  valsartan (DIOVAN) 40 MG tablet  zolpidem (AMBIEN) 5 MG tablet        Past Medical History:    Past Medical History:   Diagnosis Date    Hyperlipidaemia     Hypothyroidism        Past Surgical History:    Past Surgical History:   Procedure Laterality Date    BIOPSY  2009    Screen for skin cancer. Benign.    COLONOSCOPY  2014    COLONOSCOPY Left 08/28/2020    Procedure: COLONOSCOPY;  Surgeon: Wilmer Porter MD;  Location:  GI    EYE SURGERY  1999    [facility]    ORTHOPEDIC SURGERY  2001    Tibia and fibula fracture. Mohsen, plate and screws in left leg        Physical Exam   Patient Vitals for the past 24 hrs:   BP  Temp Temp src Pulse Resp SpO2   09/13/23 0343 (!) 145/61 -- -- -- -- --   09/13/23 0333 (!) 159/68 -- -- 58 -- --   09/13/23 0323 (!) 168/63 -- -- -- -- --   09/13/23 0315 (!) 164/59 -- -- 61 -- --   09/13/23 0303 (!) 166/66 -- -- 58 -- --   09/13/23 0243 (!) 156/67 -- -- 56 -- --   09/13/23 0233 (!) 179/67 -- -- -- -- --   09/13/23 0022 (!) 190/78 (!) 96.7  F (35.9  C) Temporal 66 18 98 %      Physical Exam  General: Patient is awake, alert  Head: The scalp, face, and head appear normal  Eyes: The pupils are equal, round, and reactive to light. Conjunctivae and sclerae are normal  ENT: External acoustic canals are normal. The oropharynx is normal without erythema. Uvula is in the midline  Neck: Normal range of motion.   CV: Regular rate and rhythm.   Resp: Lungs are clear without wheezes or rales. No respiratory distress.   GI: Abdomen is soft, no rigidity, guarding, or rebound. No distension. No tenderness to palpation in any quadrant.  MS: Normal tone. Joints grossly normal without effusions. No asymmetric leg swelling, calf or thigh tenderness.    Skin: No rash or lesions noted. Normal capillary refill noted  Neuro: Speech is normal and fluent. Face is symmetric. Moving all extremities.   Psych:  Normal affect.  Appropriate interactions.       Emergency Department Course   ECG  ECG results from 09/13/23   EKG 12-lead, tracing only     Value    Systolic Blood Pressure     Diastolic Blood Pressure     Ventricular Rate 54    Atrial Rate 54    VA Interval 116    QRS Duration 84        QTc 426    P Axis 63    R AXIS -19    T Axis 29    Interpretation ECG      Sinus bradycardia  Low voltage QRS  Cannot rule out Anterior infarct , age undetermined  Abnormal ECG  When compared with ECG of 07-FEB-2001 20:31,  Minimal criteria for Anterior infarct are now Present  Confirmed by - EMERGENCY ROOM, PHYSICIAN ([id number]),  KAY MEJIA (Cynthia) on 9/13/2023 6:52:08 AM          Laboratory:  Labs Ordered and Resulted  from Time of ED Arrival to Time of ED Departure   COMPREHENSIVE METABOLIC PANEL - Abnormal       Result Value    Sodium 136      Potassium 4.0      Chloride 103      Carbon Dioxide (CO2) 24      Anion Gap 9      Urea Nitrogen 16.7      Creatinine 0.77      Calcium 9.8      Glucose 114 (*)     Alkaline Phosphatase 118 (*)     AST 32      ALT 43      Protein Total 7.5      Albumin 4.3      Bilirubin Total 0.4      GFR Estimate 86     LIPASE - Normal    Lipase 49     TROPONIN T, HIGH SENSITIVITY - Normal    Troponin T, High Sensitivity 10     CBC WITH PLATELETS AND DIFFERENTIAL    WBC Count 8.3      RBC Count 4.84      Hemoglobin 13.8      Hematocrit 41.6      MCV 86      MCH 28.5      MCHC 33.2      RDW 13.2      Platelet Count 257      % Neutrophils 65      % Lymphocytes 22      % Monocytes 8      % Eosinophils 4      % Basophils 1      % Immature Granulocytes 0      NRBCs per 100 WBC 0      Absolute Neutrophils 5.5      Absolute Lymphocytes 1.8      Absolute Monocytes 0.7      Absolute Eosinophils 0.3      Absolute Basophils 0.0      Absolute Immature Granulocytes 0.0      Absolute NRBCs 0.0          Emergency Department Course & Assessments:      Disposition:  The patient was discharged to home.     Impression & Plan      Medical Decision Making:  This is a very kind 62-year-old female with past medical history of hypertension, hyperlipidemia and hypothyroidism who presents to the emergency department with a brief episode of right upper quadrant pain and elevated blood pressures.  Patient had an episode of right upper quadrant pain that resolved prior to her presentation to the emergency department.  She checked her blood pressure during this episode and she has quite elevated pressures prompting her visit to the ER.  The time she arrived here she is symptom-free.  EKG shows no significant signs of ischemia nor dysrhythmia.  Blood work shows no signs of endorgan damage.  I discussed performing a right upper  quadrant ultrasound to rule out evidence of cholelithiasis.  Patient politely declined and will follow up with her primary care doctor.  She has no significant abdominal tenderness, guarding or rebound.  Very low suspicion for significant cholecystitis, cholangitis or biliary obstruction.  Patient's blood work is otherwise reassuring.  Patient's blood pressure came down in the emergency department without intervention.  No evidence of hypertensive emergency or urgency requiring admission.  We will have her follow-up with her primary care doctor in regards to her right upper quadrant pain and hypertension.  Reasons to return to the emergency department for discussed.      Diagnosis:    ICD-10-CM    1. Hypertension, unspecified type  I10       2. RUQ pain, resolved  R10.11            Discharge Medications:  New Prescriptions    No medications on file          MD Lin Allen, Saji Jesus MD  09/14/23 0839

## 2023-09-13 NOTE — ED TRIAGE NOTES
Arrives from home. States had upper abdominal pain which has resolved. States checked her blood pressure while in pain and found BP to be 200 sbp.

## 2023-09-19 ENCOUNTER — OFFICE VISIT (OUTPATIENT)
Dept: FAMILY MEDICINE | Facility: CLINIC | Age: 63
End: 2023-09-19
Payer: COMMERCIAL

## 2023-09-19 VITALS
RESPIRATION RATE: 20 BRPM | DIASTOLIC BLOOD PRESSURE: 74 MMHG | HEIGHT: 66 IN | WEIGHT: 200 LBS | BODY MASS INDEX: 32.14 KG/M2 | SYSTOLIC BLOOD PRESSURE: 121 MMHG | HEART RATE: 66 BPM | OXYGEN SATURATION: 98 % | TEMPERATURE: 99.2 F

## 2023-09-19 DIAGNOSIS — K80.20 CALCULUS OF GALLBLADDER WITHOUT CHOLECYSTITIS WITHOUT OBSTRUCTION: Primary | ICD-10-CM

## 2023-09-19 PROCEDURE — 99213 OFFICE O/P EST LOW 20 MIN: CPT | Performed by: PHYSICIAN ASSISTANT

## 2023-09-19 NOTE — PROGRESS NOTES
"  Assessment & Plan       Calculus of gallbladder without cholecystitis without obstruction  Has known gallstones diagnosed in 2014.  Now another episode of RUQ pain that resolves.  Due to husbands terminal illness and care taker role discussed getting another US and follow-up with surgeon to discuss removing gallbladder.       - US Abdomen Limited; Future  - Adult General Surg Referral; Future       MED REC REQUIRED  Post Medication Reconciliation Status:         Ramona Ann Aaseby-Aguilera, PA-C [facility]    Itzel Walters is a 62 year old, presenting for the following health issues:  ER F/U      9/19/2023    11:17 AM   Additional Questions   Roomed by JODI Terry LPN   Accompanied by self         9/19/2023    11:17 AM   Patient Reported Additional Medications   Patient reports taking the following new medications none     .taylor  \[facility]\""     ED/UC Followup:    Facility:  [facility]  Date of visit: 9-  Reason for visit: high BP and RUQ pain    Current Status: stable           Selena has known gall stones and had RUQ us in 2014.  Has not had issues with this until the 13th when she had a bad Right UQ pain.  This resolved on its own but her bp was exteremly high so went oED.  It cam e down to normal levels at ED  Review of Systems   Constitutional, HEENT, cardiovascular, pulmonary, gi and gu systems are negative, except as otherwise noted.      Objective    /74   Pulse 66   Temp 99.2  F (37.3  C) (Oral)   Resp 20   Ht 1.664 m (5' 5.5\")   Wt 90.7 kg (200 lb)   LMP 09/14/2013   SpO2 98%   Breastfeeding No   BMI 32.78 kg/m    Body mass index is 32.78 kg/m .  Physical Exam   GENERAL: healthy, alert and no distress  RESP: lungs clear to auscultation - no rales, rhonchi or wheezes  CV: regular rate and rhythm, normal S1 S2, no S3 or S4, no murmur, click or rub, no peripheral edema and peripheral pulses strong  ABDOMEN: soft, nontender, no hepatosplenomegaly, no masses and bowel " sounds normal  MS: no gross musculoskeletal defects noted, no edema

## 2023-09-25 ENCOUNTER — ANCILLARY PROCEDURE (OUTPATIENT)
Dept: ULTRASOUND IMAGING | Facility: CLINIC | Age: 63
End: 2023-09-25
Attending: PHYSICIAN ASSISTANT
Payer: COMMERCIAL

## 2023-09-25 DIAGNOSIS — K80.20 CALCULUS OF GALLBLADDER WITHOUT CHOLECYSTITIS WITHOUT OBSTRUCTION: ICD-10-CM

## 2023-09-25 PROCEDURE — 76705 ECHO EXAM OF ABDOMEN: CPT

## 2023-10-03 ENCOUNTER — OFFICE VISIT (OUTPATIENT)
Dept: SURGERY | Facility: CLINIC | Age: 63
End: 2023-10-03
Payer: COMMERCIAL

## 2023-10-03 ENCOUNTER — TELEPHONE (OUTPATIENT)
Dept: SURGERY | Facility: CLINIC | Age: 63
End: 2023-10-03

## 2023-10-03 VITALS
BODY MASS INDEX: 32.14 KG/M2 | WEIGHT: 200 LBS | HEIGHT: 66 IN | SYSTOLIC BLOOD PRESSURE: 138 MMHG | HEART RATE: 67 BPM | DIASTOLIC BLOOD PRESSURE: 64 MMHG | OXYGEN SATURATION: 95 %

## 2023-10-03 DIAGNOSIS — K80.20 GALLSTONES: Primary | ICD-10-CM

## 2023-10-03 PROCEDURE — 99204 OFFICE O/P NEW MOD 45 MIN: CPT | Performed by: SURGERY

## 2023-10-03 RX ORDER — INDOCYANINE GREEN AND WATER 25 MG
2.5 KIT INJECTION ONCE
Status: CANCELLED | OUTPATIENT
Start: 2023-10-03 | End: 2023-10-03

## 2023-10-03 NOTE — PROGRESS NOTES
Chief complaint:  Abdominal pain, right upper quadrant    HPI:  This patient is a 62 year old female who presents with a recent episode of right upper quadrant abdominal pain.  The patient has known she had gallstones since 2014.  She has not previously had any symptoms which seemed related to that.  She recently had an episode of severe right upper quadrant pain, however.  This lasted about 20 minutes.  It was associated with significant hypertension.  She therefore went into the emergency room, even though her pain had resolved.    Past Medical History:   has a past medical history of Hyperlipidaemia and Hypothyroidism.    Past Surgical History:  Past Surgical History:   Procedure Laterality Date    BIOPSY  2009    Screen for skin cancer. Benign.    COLONOSCOPY  2014    COLONOSCOPY Left 08/28/2020    Procedure: COLONOSCOPY;  Surgeon: Wilmer Porter MD;  Location:  GI    EYE SURGERY  1999    [facility]    ORTHOPEDIC SURGERY  2001    Tibia and fibula fracture. 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 Yes   Social History Narrative     since 1982 , going well, working FT-in [employer],2 kids-29 yo and 28 yo olds, no grandkids, no pets     Social Determinants of Health     Financial  Resource Strain: Low Risk  (7/29/2023)    Overall Financial Resource Strain (CARDIA)     Difficulty of Paying Living Expenses: Not hard at all   Food Insecurity: No Food Insecurity (7/29/2023)    Hunger Vital Sign     Worried About Running Out of Food in the Last Year: Never true     Ran Out of Food in the Last Year: Never true   Transportation Needs: No Transportation Needs (7/29/2023)    PRAPARE - Transportation     Lack of Transportation (Medical): No     Lack of Transportation (Non-Medical): No   Physical Activity: Insufficiently Active (7/29/2023)    Exercise Vital Sign     Days of Exercise per Week: 2 days     Minutes of Exercise per Session: 30 min   Stress: Stress Concern Present (7/29/2023)    [employer] - Occupational Stress Questionnaire     Feeling of Stress : Rather much   Social Connections: Moderately Integrated (7/29/2023)    Social Connection and Isolation Panel [NHANES]     Frequency of Communication with Friends and Family: More than three times a week     Frequency of Social Gatherings with Friends and Family: Once a week     Attends Synagogue Services: 1 to 4 times per year     Active Member of Clubs or Organizations: No     Attends Club or Organization Meetings: Not on file     Marital Status:    Interpersonal Safety: Not on file   Housing Stability: Low Risk  (7/29/2023)    Housing Stability Vital Sign     Unable to Pay for Housing in the Last Year: No     Number of Places Lived in the Last Year: 2     Unstable Housing in the Last Year: No        Family History:  Family History   Problem Relation Age of Onset    Heart Disease Mother         pacemaker    Coronary Artery Disease Mother     Hypertension Mother     Hyperlipidemia Mother     C.A.D. Father         MI-2 times, had cardiac arrest at age of 70, first heart attack at age of 51    Cerebrovascular Disease Father     Coronary Artery Disease Father     Hyperlipidemia Father     Other Cancer Father          Melanoma    Diabetes Father         type 2    Thyroid Disease Sister         one sis with thyroid issues, not cancer    Coronary Artery Disease Sister     Osteoporosis Sister     Obesity Sister     C.A.D. Brother         MI-one, at age of 48-defibrillator in place now , smoker in past     Respiratory Brother     Coronary Artery Disease Brother     C.A.D. Sister         2-morbid obesity , smoker, MI at age of late 50's    Diabetes Sister     Coronary Artery Disease Sister     Thyroid Disease Sister     Obesity Sister     Thyroid Disease Sister     Osteoporosis Sister     Coronary Artery Disease Sister     Osteoporosis Sister     Thyroid Disease Sister     Hypertension Sister     Osteoporosis Sister     Thyroid Disease Sister     Breast Cancer Other         maternal aunt at age of 70's    Asthma Daughter     Obesity Sister     Breast Cancer Other     Depression Daughter     Anxiety Disorder Daughter     Asthma Daughter     Cancer - colorectal No family hx of     Colon Cancer No family hx of     Ovarian Cancer No family hx of        Review of Systems:  The 10 point Review of Systems is negative other than noted in the HPI and above.    Physical Exam:  General - This is a well developed, well nourished female in no apparent distress.  HEENT - Normocephalic, atraumatic.  No scleral icterus.  Neck - supple without masses  Lungs - clear to ascultation.    Heart - Regular rate & rhythm without murmur  Abdomen:   soft, non-distended and nontender.    Extremities - warm without edema  Neurologic - nonfocal    Relevant labs:  Normal liver function tests with the exception of a very minimally elevated alkaline phosphatase.    Imaging:  Gallbladder reveals gallstones measuring up to 2.5 cm.  There is dense shadowing behind the stones.    Assessment and Plan:  This is a patient with longstanding gallstones who has now developed an episode of fairly severe right upper quadrant pain.  I have recommended Laparoscopic  cholecystectomy.  We discussed the procedure, along with its risks and complications, in detail.  The patient has agreed to proceed.  We will work on getting her scheduled for surgery in the near future.    A total of 45 minutes was spent today in chart review, patient examination and discussion, and documentation.    Ovi Griffith MD  [facility]    Please route or send letter to:  Referring Provider

## 2023-10-03 NOTE — TELEPHONE ENCOUNTER
Type of surgery: LAPAROSCOPIC CHOLECYSTECTOMY   Location of surgery: [facility]  Date and time of surgery: 10-26-23, 12 PM   Surgeon: DR CAGLE   Pre-Op Appt Date: PATIENT TO SCHEDULE   Post-Op Appt Date: NA    Packet sent out: Yes  Pre-cert/Authorization completed:  Not Applicable  Date: 10-3-23      LAPAROSCOPIC CHOLECYSTECTOMY GENERAL PT INST TO HAVE H&P 60 MINS REQ PA ASSIST DFB ALW

## 2023-10-03 NOTE — LETTER
October 4, 2023          Ramona Ann Aaseby-Aguilera, PA-C  [address] [place] [address]      RE:   Selena Saenz 1960      Dear Colleague,    Thank you for referring your patient, Selena Saenz, to [facility] [facility]. Please see a copy of my visit note below.    Chief complaint:  Abdominal pain, right upper quadrant    HPI:  This patient is a 62 year old female who presents with a recent episode of right upper quadrant abdominal pain.  The patient has known she had gallstones since 2014.  She has not previously had any symptoms which seemed related to that.  She recently had an episode of severe right upper quadrant pain, however.  This lasted about 20 minutes.  It was associated with significant hypertension.  She therefore went into the emergency room, even though her pain had resolved.    Past Medical History:   has a past medical history of Hyperlipidaemia and Hypothyroidism.    Review of Systems:  The 10 point Review of Systems is negative other than noted in the HPI and above.    Physical Exam:  General - This is a well developed, well nourished female in no apparent distress.  HEENT - Normocephalic, atraumatic.  No scleral icterus.  Neck - supple without masses  Lungs - clear to ascultation.    Heart - Regular rate & rhythm without murmur  Abdomen:   soft, non-distended and nontender.    Extremities - warm without edema  Neurologic - nonfocal    Relevant labs:  Normal liver function tests with the exception of a very minimally elevated alkaline phosphatase.    Imaging:  Gallbladder reveals gallstones measuring up to 2.5 cm.  There is dense shadowing behind the stones.    Assessment and Plan:  This is a patient with longstanding gallstones who has now developed an episode of fairly severe right upper quadrant pain.  I have recommended Laparoscopic cholecystectomy.  We discussed the procedure, along with its risks and complications, in detail.  The patient has  agreed to proceed.  We will work on getting her scheduled for surgery in the near future.    A total of 45 minutes was spent today in chart review, patient examination and discussion, and documentation.    Again, thank you for allowing me to participate in the care of your patient.      Sincerely,      MD OZ Benoit/evaristo      D: 10/4/2023  T: 10/4/23

## 2023-10-17 ENCOUNTER — PATIENT OUTREACH (OUTPATIENT)
Dept: GASTROENTEROLOGY | Facility: CLINIC | Age: 63
End: 2023-10-17
Payer: COMMERCIAL

## 2023-10-20 SDOH — HEALTH STABILITY: PHYSICAL HEALTH: ON AVERAGE, HOW MANY MINUTES DO YOU ENGAGE IN EXERCISE AT THIS LEVEL?: 30 MIN

## 2023-10-20 SDOH — HEALTH STABILITY: PHYSICAL HEALTH: ON AVERAGE, HOW MANY DAYS PER WEEK DO YOU ENGAGE IN MODERATE TO STRENUOUS EXERCISE (LIKE A BRISK WALK)?: 2 DAYS

## 2023-10-20 ASSESSMENT — LIFESTYLE VARIABLES
AUDIT-C TOTAL SCORE: 3
HOW OFTEN DO YOU HAVE SIX OR MORE DRINKS ON ONE OCCASION: NEVER
HOW OFTEN DO YOU HAVE A DRINK CONTAINING ALCOHOL: 2-3 TIMES A WEEK
SKIP TO QUESTIONS 9-10: 1
HOW MANY STANDARD DRINKS CONTAINING ALCOHOL DO YOU HAVE ON A TYPICAL DAY: 1 OR 2

## 2023-10-20 ASSESSMENT — SOCIAL DETERMINANTS OF HEALTH (SDOH)
HOW OFTEN DO YOU ATTENT MEETINGS OF THE CLUB OR ORGANIZATION YOU BELONG TO?: NEVER
HOW OFTEN DO YOU ATTEND CHURCH OR RELIGIOUS SERVICES?: 1 TO 4 TIMES PER YEAR
IN A TYPICAL WEEK, HOW MANY TIMES DO YOU TALK ON THE PHONE WITH FAMILY, FRIENDS, OR NEIGHBORS?: MORE THAN THREE TIMES A WEEK
HOW OFTEN DO YOU GET TOGETHER WITH FRIENDS OR RELATIVES?: TWICE A WEEK
DO YOU BELONG TO ANY CLUBS OR ORGANIZATIONS SUCH AS CHURCH GROUPS UNIONS, FRATERNAL OR ATHLETIC GROUPS, OR SCHOOL GROUPS?: NO

## 2023-10-20 NOTE — COMMUNITY RESOURCES LIST (ENGLISH)
10/20/2023   [facility] [employer]  N/A  For questions about this resource list or additional care needs, please contact your primary care clinic or care manager.  Phone: [phone]   Email: N/A   Address: [address] [place] [address]   Hours: N/A        Hotlines and Helplines       Hotline - Housing crisis  1  [facility] (Main Office) - Emergency Services Distance: 11.66 miles      Phone/Virtual   [address] [place] [address]  Language: English  Hours: Mon - Sun Open 24 Hours   Phone: (530) 834-6084 Email: info@[facility].org Website: http://[facility].org     2  [facility] Distance: 18.6 miles      Phone/Virtual   [address] [place] [address]  Language: English  Hours: Mon - Sun Open 24 Hours   Phone: (657) 140-3881 Email: communications@[facility]-[place].org Website: https://[facility]-mn.org/oursaviourshousing/          Housing       Coordinated Entry access point  3  [facility] - [facility] Distance: 18.28 miles      In-Person, Phone/Virtual   [address] [place] [address]  Language: English, Australian  Hours: Mon - Fri 8:30 AM - 4:30 PM  Fees: Free   Phone: (756) 177-1291 Email: info@[facility].org Website: https://www.[facility].org/locations/[facility]First [facility]-clinic/     4  [facility] ([facility] - Housing Services Distance: 18.52 miles      In-Person   [address] [place] [address]  Language: English  Hours: Mon - Fri 9:00 AM - 5:00 PM  Fees: Free   Phone: (273) 680-7885 Email: housing@[facility].org Website: http://www.[facility].org/housing     Drop-in center or day shelter  5  [facility] Distance: 18.94 miles      In-Person   [address] [place] [address]  Language: English  Hours: Mon - Fri 12:00 PM - 3:00 PM  Fees: Free   Phone: (951) 363-3729 Email: brooklynSPS Commercecomamilcarity@100e.com.com Website: http://iHeart.org/     6  [facility] [place] [facility] [place] [facility]  [facility] Distance: 19.03 miles      In-Person   [address] [place] [address]  Language: English, Sammarinese, Central African  Hours: Mon - Sat 7:00 AM - 3:00 PM  Fees: Free, Self Pay   Phone: (929) 772-5420 Email: info@[employer] Website: https://www.[employer].Weft/locations/opportunity-center/     Housing search assistance  7  [place] [facility] - Rental Homes for Future Homebuyers Program Distance: 10.19 miles      Phone/Virtual   [address] [place] [address]  Language: English  Hours: Mon - Fri 8:00 AM - 4:30 PM  Fees: Free   Phone: (314) 713-3448 Email: hra@[facility] Website: https://www.[facility]/hra/[place]-housing-and-bgpwikovdldhd-pgnmvdjhe-mio     8  [facility] Aging and Disability Services Distance: 14.48 miles      In-Person   [address] [place] [address]  Language: English  Hours: Mon - Fri 8:00 AM - 4:00 PM  Fees: Free, Insurance, Sliding Fee   Phone: (113) 324-9018 Email: ligia@[facility]. Website: https://www.co.[facility]./HealthFamily/Disabilities     Shelter for families  9  [facility] Family Shelter Distance: 10.21 miles      In-Person   [address] [place] [address]  Language: English  Hours: Mon - Sun Open 24 Hours  Fees: Free, Sliding Fee   Phone: (970) 471-3566 Ext.1 Email: info@[facility].Weft Website: http://www.[facility].org     Shelter for individuals  10  [facility] ([facility] Distance: 5.55 miles      In-Person   [address] [place] [address]  Language: English, Central African  Hours: Mon - Fri 8:00 AM - 4:30 PM  Fees: Free   Phone: (257) 963-1767 Email: info@capagency.org Website: http://www.capagency.org     11  [facility] [place] - [facility] [place] [facility] - [facility] [place] [facility] Distance: 18.28 miles      In-Person   [address] [facility] [facility]  [place] [address]  Language: English  Hours: Mon - Sun 5:00 PM - 10:00 AM  Fees: Free, Self Pay   Phone: (204) 678-9013 Email: info@[employer] Website: https://www.AppsFlyer.Exercise.com/locations/[facility]-saint-paul/          Important Numbers & Websites       Emergency Services   911  [facility] Services   311  Poison Control   (826) 649-5388  Suicide Prevention Lifeline   (268) 895-2936 (TALK)  Child Abuse Hotline   (104) 901-9274 (4-A-Child)  Sexual Assault Hotline   (869) 461-2080 (HOPE)  National Runaway Safeline   (646) 927-9331 (RUNAWAY)  All-Options Talkline   (101) 815-1811  Substance Abuse Referral   (204) 938-3517 (HELP)

## 2023-10-25 ENCOUNTER — OFFICE VISIT (OUTPATIENT)
Dept: FAMILY MEDICINE | Facility: CLINIC | Age: 63
End: 2023-10-25
Payer: COMMERCIAL

## 2023-10-25 VITALS
SYSTOLIC BLOOD PRESSURE: 112 MMHG | HEART RATE: 76 BPM | BODY MASS INDEX: 31.72 KG/M2 | OXYGEN SATURATION: 98 % | RESPIRATION RATE: 18 BRPM | HEIGHT: 66 IN | TEMPERATURE: 98.6 F | DIASTOLIC BLOOD PRESSURE: 56 MMHG | WEIGHT: 197.4 LBS

## 2023-10-25 DIAGNOSIS — Z01.818 PREOP GENERAL PHYSICAL EXAM: ICD-10-CM

## 2023-10-25 DIAGNOSIS — K80.20 CALCULUS OF GALLBLADDER WITHOUT CHOLECYSTITIS WITHOUT OBSTRUCTION: ICD-10-CM

## 2023-10-25 DIAGNOSIS — Z13.9 ENCOUNTER FOR SCREENING INVOLVING SOCIAL DETERMINANTS OF HEALTH (SDOH): Primary | ICD-10-CM

## 2023-10-25 PROCEDURE — 99214 OFFICE O/P EST MOD 30 MIN: CPT | Performed by: NURSE PRACTITIONER

## 2023-10-25 ASSESSMENT — PAIN SCALES - GENERAL: PAINLEVEL: NO PAIN (0)

## 2023-10-25 NOTE — COMMUNITY RESOURCES LIST (ENGLISH)
10/25/2023   [facility] - Outpatient Clinics  N/A  For additional resource needs, please contact your health insurance member services or your primary care team.  Phone: [phone]   Email: N/A   Address: [address] [place] [address]   Hours: N/A        Hotlines and Helplines       Hotline - Housing crisis  1  [facility] (Main Office) - Emergency Services Distance: 11.66 miles      Phone/Virtual   [address] [place] [address]  Language: English  Hours: Mon - Sun Open 24 Hours   Phone: (374) 789-6793 Email: info@[facility] Website: http://[facility]App [facility]Ev[facility].org     2  [facility] Distance: 18.6 miles      Phone/Virtual   [address] [place] [address]  Language: English  Hours: Mon - Sun Open 24 Hours   Phone: (118) 867-7807 Email: communications@[facility].org Website: https://[facility]-mn.org/oursaviourshousing/          Housing       Coordinated Entry access point  3  [facility] Distance: 18.28 miles      In-Person, Phone/Virtual   [address] [place] [address]  Language: English, Serbian  Hours: Mon - Fri 8:30 AM - 4:30 PM  Fees: Free   Phone: (615) 673-7307 Email: info@[facility].org Website: https://www.[facility].org/locations/downto-clinic/     4  [facility] ([facility] - Housing Services Distance: 18.52 miles      In-Person   [address] [place] [address]  Language: English  Hours: Mon - Fri 9:00 AM - 5:00 PM  Fees: Free   Phone: (334) 401-5111 Email: housing@[facility].org Website: http://www.[facility].org/housing     Drop-in center or day shelter  5  [facility] Distance: 18.94 miles      In-Person   [address] [place] [address]  Language: English  Hours: Mon - Fri 12:00 PM - 3:00 PM  Fees: Free   Phone: (486) 786-4246 Email: Global Industry@Allmyapps Website: http://peacehousecommunity.org/     6  [facility] [place] [facility]  [facility] Distance: 19.03 miles      In-Person   [address] [place] [address]  Language: English, Dominican, Austrian  Hours: Mon - Sat 7:00 AM - 3:00 PM  Fees: Free, Self Pay   Phone: (356) 387-6631 Email: info@[employer] Website: https://www.[employer]ri[employer]lex/locations/opportunity-center/     Housing search assistance  7  [employer] - https://SeeClickFix/ Distance: 19.79 miles      Phone/Virtual   [address] [place] [address]  Language: English  Hours: Mon - Sun Open 24 Hours   Email: info@[employer] Website: https://SeeClickFix     8  Housing[employer] - Online housing search assistance Distance: 20.17 miles      Phone/Virtual   [address] [address]  Language: English, Hmong, Dominican, Austrian  Hours: Mon - Sun Open 24 Hours   Phone: (488) 534-7547 Email: info@[employer]ylink.org Website: http://www.housinglink.org/     Shelter for families  9  [facility] Family Shelter Distance: 10.21 miles      In-Person   [address] [place] [address]  Language: English  Hours: Mon - Sun Open 24 Hours  Fees: Free, Sliding Fee   Phone: (215) 198-9769 Ext.1 Email: info@[facility].goBalto Website: http://www.[facility].goBalto     Shelter for individuals  10  [facility] ([facility]) [facility] Distance: 5.55 miles      In-Person   [address], [place] [address]  Language: English, Austrian  Hours: Mon - Fri 8:00 AM - 4:30 PM  Fees: Free   Phone: (328) 439-3719 Email: info@[facility].org Website: http://www.[facility] Centeragency.org     11  [facility] - [facility] [place] [facility] - [facility] [place] [facility] Distance: 18.28 miles      In-Person   [address] [place] [address]  Language: English  Hours: Mon - Sun 5:00 PM - 10:00 AM  Fees: Free, Self Pay   Phone: (902) 451-9666 Email: info@cctwincities.org Website:  https://www.epicuriowincities.org/locations/higher-ground-saintjyothi/          Important Numbers & Websites       [place] [address] 211itedway.org  Poison Control   (888) 658-9500 [place]po[place].org  Suicide and Crisis Lifeline   982 988[facility] Hospit[facility].org  Childhelp [place] Child Abuse Hotline   992.927.2733 Childhelphotline.org  [place] Sexual Assault Hotline   (376) 387-6426 (HOPE) [facility].org  [place] Runaway Safeline   (303) 386-2959 (RUNAWAY) [facility] Countyrunaway.org  Pregnancy & Postpartum Support [place]   Call/text 771-683-5428 Ppsupportmn.org  Substance Abuse National Helpline ([facility]   310-376-HELP (1792) Findtreatment.gov  Emergency Services   917

## 2023-10-25 NOTE — PATIENT INSTRUCTIONS
Preparing for Your Surgery  Getting started  A nurse will call you to review your health history and instructions. They will give you an arrival time based on your scheduled surgery time. Please be ready to share:  Your doctor's clinic name and phone number  Your medical, surgical, and anesthesia history  A list of allergies and sensitivities  A list of medicines, including herbal treatments and over-the-counter drugs  Whether the patient has a legal guardian (ask how to send us the papers in advance)  Please tell us if you're pregnant--or if there's any chance you might be pregnant. Some surgeries may injure a fetus (unborn baby), so they require a pregnancy test. Surgeries that are safe for a fetus don't always need a test, and you can choose whether to have one.   If you have a child who's having surgery, please ask for a copy of Preparing for Your Child's Surgery.    Preparing for surgery  Within 10 to 30 days of surgery: Have a pre-op exam (sometimes called an H&P, or History and Physical). This can be done at a clinic or pre-operative center.  If you're having a , you may not need this exam. Talk to your care team.  At your pre-op exam, talk to your care team about all medicines you take. If you need to stop any medicines before surgery, ask when to start taking them again.  We do this for your safety. Many medicines can make you bleed too much during surgery. Some change how well surgery (anesthesia) drugs work.  Call your insurance company to let them know you're having surgery. (If you don't have insurance, call [phone].)  Call your clinic if there's any change in your health. This includes signs of a cold or flu (sore throat, runny nose, cough, rash, fever). It also includes a scrape or scratch near the surgery site.  If you have questions on the day of surgery, call your hospital or surgery center.  Eating and drinking guidelines  For your safety: Unless your surgeon tells you otherwise,  follow the guidelines below.  Eat and drink as usual until 8 hours before you arrive for surgery. After that, no food or milk.  Drink clear liquids until 2 hours before you arrive. These are liquids you can see through, like water, Gatorade, and Propel Water. They also include plain black coffee and tea (no cream or milk), candy, and breath mints. You can spit out gum when you arrive.  If you drink alcohol: Stop drinking it the night before surgery.  If your care team tells you to take medicine on the morning of surgery, it's okay to take it with a sip of water.  Preventing infection  Shower or bathe the night before and morning of your surgery. Follow the instructions your clinic gave you. (If no instructions, use regular soap.)  Don't shave or clip hair near your surgery site. We'll remove the hair if needed.  Don't smoke or vape the morning of surgery. You may chew nicotine gum up to 2 hours before surgery. A nicotine patch is okay.  Note: Some surgeries require you to completely quit smoking and nicotine. Check with your surgeon.  Your care team will make every effort to keep you safe from infection. We will:  Clean our hands often with soap and water (or an alcohol-based hand rub).  Clean the skin at your surgery site with a special soap that kills germs.  Give you a special gown to keep you warm. (Cold raises the risk of infection.)  Wear special hair covers, masks, gowns and gloves during surgery.  Give antibiotic medicine, if prescribed. Not all surgeries need antibiotics.  What to bring on the day of surgery  Photo ID and insurance card  Copy of your health care directive, if you have one  Glasses and hearing aids (bring cases)  You can't wear contacts during surgery  Inhaler and eye drops, if you use them (tell us about these when you arrive)  CPAP machine or breathing device, if you use them  A few personal items, if spending the night  If you have . . .  A pacemaker, ICD (cardiac defibrillator) or other  implant: Bring the ID card.  An implanted stimulator: Bring the remote control.  A legal guardian: Bring a copy of the certified (court-stamped) guardianship papers.  Please remove any jewelry, including body piercings. Leave jewelry and other valuables at home.  If you're going home the day of surgery  You must have a responsible adult drive you home. They should stay with you overnight as well.  If you don't have someone to stay with you, and you aren't safe to go home alone, we may keep you overnight. Insurance often won't pay for this.  After surgery  If it's hard to control your pain or you need more pain medicine, please call your surgeon's office.  Questions?   If you have any questions for your care team, list them here: _________________________________________________________________________________________________________________________________________________________________________ ____________________________________ ____________________________________ ____________________________________  For informational purposes only. Not to replace the advice of your health care provider. Copyright   2003, 2019 [employer] [facility]. All rights reserved. Clinically reviewed by Betsy Joseph MD. [employer] 965831 - REV 12/22.    How to Take Your Medication Before Surgery  - Take all of your medications before surgery as usual

## 2023-10-25 NOTE — PROGRESS NOTES
[facility]  [address] [address] [address]  Phone: [phone]  Primary Provider: Aaseby-Aguilera, Ramona Ann  Pre-op Performing Provider: NO LUCAS      PREOPERATIVE EVALUATION:  Today's date: 10/25/2023    Selena is a 62 year old female who presents for a preoperative evaluation.      10/25/2023     1:44 PM   Additional Questions   Roomed by Katherine POON       Surgical Information:  Surgery/Procedure: CHOLECYSTECTOMY, LAPAROSCOPIC   Surgery Location: [facility]  Surgeon:   Ovi Griffith MD     Surgery Date: 10/26/2023  Time of Surgery: 12:00pm  Where patient plans to recover: At home with family  Fax number for surgical facility: Note does not need to be faxed, will be available electronically in Epic.    Assessment & Plan     The proposed surgical procedure is considered INTERMEDIATE risk.    Encounter for screening involving social determinants of health (SDoH)  stable    Preop general physical exam  Cleared for procedure    Calculus of gallbladder without cholecystitis without obstruction  Cleared for procedure.         Possible Sleep Apnea: n/a         - No identified additional risk factors other than previously addressed    Antiplatelet or Anticoagulation Medication Instructions:   - aspirin: Bleeding risk is low for this procedure and daily aspirin may be continued without modification.     Additional Medication Instructions:   - Calcium Channel Blockers: May be continued on the day of surgery.    RECOMMENDATION:  APPROVAL GIVEN to proceed with proposed procedure, without further diagnostic evaluation.    Subjective       HPI related to upcoming procedure: She is here today for pre op for gallbladder removal, she has gallstones and she is feeling better but was in the ED for RUQ pain and HTN.         10/20/2023    11:08 AM   Preop Questions   1. Have you ever had a heart attack or stroke? No   2. Have you ever had surgery on your  heart or blood vessels, such as a stent placement, a coronary artery bypass, or surgery on an artery in your head, neck, heart, or legs? No   3. Do you have chest pain with activity? No   4. Do you have a history of  heart failure? No   5. Do you currently have a cold, bronchitis or symptoms of other infection? No   6. Do you have a cough, shortness of breath, or wheezing? No   7. Do you or anyone in your family have previous history of blood clots? YES - father  of stroke 80's   8. Do you or does anyone in your family have a serious bleeding problem such as prolonged bleeding following surgeries or cuts? No   9. Have you ever had problems with anemia or been told to take iron pills? No   10. Have you had any abnormal blood loss such as black, tarry or bloody stools, or abnormal vaginal bleeding? No   11. Have you ever had a blood transfusion? No   12. Are you willing to have a blood transfusion if it is medically needed before, during, or after your surgery? Yes   13. Have you or any of your relatives ever had problems with anesthesia? No   14. Do you have sleep apnea, excessive snoring or daytime drowsiness? UNKNOWN   15. Do you have any artifical heart valves or other implanted medical devices like a pacemaker, defibrillator, or continuous glucose monitor? No   16. Do you have artificial joints? No   17. Are you allergic to latex? No       Health Care Directive:  Patient does not have a Health Care Directive or Living Will: Discussed advance care planning with patient; however, patient declined at this time.    Preoperative Review of :   reviewed - no record of controlled substances prescribed.      Review of Systems  CONSTITUTIONAL: NEGATIVE for fever, chills, change in weight  INTEGUMENTARY/SKIN: NEGATIVE for worrisome rashes, moles or lesions  EYES: NEGATIVE for vision changes or irritation  ENT/MOUTH: NEGATIVE for ear, mouth and throat problems  RESP: NEGATIVE for significant cough or SOB  CV:  NEGATIVE for chest pain, palpitations or peripheral edema  GI: NEGATIVE for nausea, abdominal pain, heartburn, or change in bowel habits  : NEGATIVE for frequency, dysuria, or hematuria  MUSCULOSKELETAL: NEGATIVE for significant arthralgias or myalgia  NEURO: NEGATIVE for weakness, dizziness or paresthesias  ENDOCRINE: NEGATIVE for temperature intolerance, skin/hair changes  HEME: NEGATIVE for bleeding problems  PSYCHIATRIC: NEGATIVE for changes in mood or affect    Patient Active Problem List    Diagnosis Date Noted    CKD (chronic kidney disease) stage 3, GFR 30-59 ml/min (H) 01/20/2017     Priority: Medium    Acquired hypothyroidism 01/18/2017     Priority: Medium    Non morbid obesity, unspecified obesity type 01/18/2017     Priority: Medium    Ovarian cyst 03/26/2014     Priority: Medium    Tubular adenoma 03/17/2014     Priority: Medium    History of colonic polyps 03/14/2014     Priority: Medium    Hyperlipidemia LDL goal <130 02/01/2012     Priority: Medium    Family history of early CAD 12/10/2010     Priority: Medium      Past Medical History:   Diagnosis Date    Hyperlipidaemia     Hypertension     Hypothyroidism      Past Surgical History:   Procedure Laterality Date    BIOPSY  2009    Screen for skin cancer. Benign.    COLONOSCOPY  2014    COLONOSCOPY Left 08/28/2020    Procedure: COLONOSCOPY;  Surgeon: Wilmer Porter MD;  Location:  GI    EYE SURGERY  1999    [facility]    ORTHOPEDIC SURGERY  2001    Tibia and fibula fracture. Mohsen, plate and screws in left leg     Current Outpatient Medications   Medication Sig Dispense Refill    aspirin 81 MG EC tablet Take 81 mg by mouth daily      atorvastatin (LIPITOR) 10 MG tablet TAKE 0.5 TABLET BY MOUTH ONCE DAILY 45 tablet 1    Calcium Carbonate-Vitamin D (CALCIUM 500 + D) 500-400 MG-UNIT TABS Take  by mouth daily.      Coenzyme Q10 (COQ-10) 100 MG CAPS       escitalopram (LEXAPRO) 5 MG tablet Take 1 tablet (5 mg) by mouth daily 90 tablet 3    levothyroxine  (SYNTHROID/LEVOTHROID) 75 MCG tablet Take 1 tablet (75 mcg) by mouth daily 90 tablet 1    Multiple Vitamin (DAILY MULTIVITAMIN PO) Take  by mouth daily.      valsartan (DIOVAN) 40 MG tablet Take 1 tablet (40 mg) by mouth daily 90 tablet 1    zolpidem (AMBIEN) 5 MG tablet Take 1 tablet (5 mg) by mouth nightly as needed for sleep 30 tablet 1       Allergies   Allergen Reactions    Cats     Pollen Extract/Tree Extract         Social History     Tobacco Use    Smoking status: Never     Passive exposure: Never    Smokeless tobacco: Never   Substance Use Topics    Alcohol use: Yes     Comment: 2-3 glasses of wine weekly     Family History   Problem Relation Age of Onset    Heart Disease Mother         pacemaker    Coronary Artery Disease Mother     Hypertension Mother     Hyperlipidemia Mother     C.A.D. Father         MI-2 times, had cardiac arrest at age of 70, first heart attack at age of 51    Cerebrovascular Disease Father     Coronary Artery Disease Father     Hyperlipidemia Father     Other Cancer Father         Melanoma    Diabetes Father         type 2    Thyroid Disease Sister         one sis with thyroid issues, not cancer    Coronary Artery Disease Sister     Osteoporosis Sister     Obesity Sister     C.A.D. Brother         MI-one, at age of 48-defibrillator in place now , smoker in past     Respiratory Brother     Coronary Artery Disease Brother     C.A.D. Sister         2-morbid obesity , smoker, MI at age of late 50's    Diabetes Sister     Coronary Artery Disease Sister     Thyroid Disease Sister     Obesity Sister     Thyroid Disease Sister     Osteoporosis Sister     Coronary Artery Disease Sister     Osteoporosis Sister     Thyroid Disease Sister     Hypertension Sister     Osteoporosis Sister     Thyroid Disease Sister     Breast Cancer Other         maternal aunt at age of 70's    Asthma Daughter     Obesity Sister     Breast Cancer Other     Depression Daughter     Anxiety Disorder Daughter      "Asthma Daughter     Cancer - colorectal No family hx of     Colon Cancer No family hx of     Ovarian Cancer No family hx of      History   Drug Use No         Objective     /56 (BP Location: Right arm, Patient Position: Sitting, Cuff Size: Adult Regular)   Pulse 76   Temp 98.6  F (37  C) (Oral)   Resp 18   Ht 1.664 m (5' 5.5\")   Wt 89.5 kg (197 lb 6.4 oz)   LMP 09/14/2013   SpO2 98%   BMI 32.35 kg/m      Physical Exam    GENERAL APPEARANCE: healthy, alert and no distress     EYES: EOMI, PERRL     HENT: ear canals and TM's normal and nose and mouth without ulcers or lesions     NECK: no adenopathy, no asymmetry, masses, or scars and thyroid normal to palpation     RESP: lungs clear to auscultation - no rales, rhonchi or wheezes     CV: regular rates and rhythm, normal S1 S2, no S3 or S4 and no murmur, click or rub     ABDOMEN:  soft, nontender, no HSM or masses and bowel sounds normal     MS: extremities normal- no gross deformities noted, no evidence of inflammation in joints, FROM in all extremities.     SKIN: no suspicious lesions or rashes     NEURO: Normal strength and tone, sensory exam grossly normal, mentation intact and speech normal     PSYCH: mentation appears normal. and affect normal/bright     LYMPHATICS: No cervical adenopathy    Recent Labs   Lab Test 09/13/23  0325 08/03/23  0742   HGB 13.8 14.0    260    140   POTASSIUM 4.0 4.5   CR 0.77 0.97*        Diagnostics:  No labs were ordered during this visit.   No EKG this visit, completed in the last 90 days.    Revised Cardiac Risk Index (RCRI):  The patient has the following serious cardiovascular risks for perioperative complications:   - No serious cardiac risks = 0 points     RCRI Interpretation: 0 points: Class I (very low risk - 0.4% complication rate)         Signed Electronically by: Amalia Muhammad NP  Copy of this evaluation report is provided to requesting physician.      "

## 2023-10-26 ENCOUNTER — ANESTHESIA (OUTPATIENT)
Dept: SURGERY | Facility: CLINIC | Age: 63
End: 2023-10-26
Payer: COMMERCIAL

## 2023-10-26 ENCOUNTER — HOSPITAL ENCOUNTER (OUTPATIENT)
Facility: CLINIC | Age: 63
Discharge: HOME | End: 2023-10-26
Attending: SURGERY | Admitting: SURGERY
Payer: COMMERCIAL

## 2023-10-26 ENCOUNTER — ANESTHESIA EVENT (OUTPATIENT)
Dept: SURGERY | Facility: CLINIC | Age: 63
End: 2023-10-26
Payer: COMMERCIAL

## 2023-10-26 ENCOUNTER — APPOINTMENT (OUTPATIENT)
Dept: SURGERY | Facility: PHYSICIAN GROUP | Age: 63
End: 2023-10-26
Payer: COMMERCIAL

## 2023-10-26 VITALS
HEIGHT: 66 IN | DIASTOLIC BLOOD PRESSURE: 67 MMHG | TEMPERATURE: 97.9 F | RESPIRATION RATE: 12 BRPM | BODY MASS INDEX: 31.63 KG/M2 | HEART RATE: 65 BPM | SYSTOLIC BLOOD PRESSURE: 147 MMHG | OXYGEN SATURATION: 96 % | WEIGHT: 196.8 LBS

## 2023-10-26 DIAGNOSIS — K80.20 GALLSTONES: ICD-10-CM

## 2023-10-26 PROCEDURE — 250N000013 HC RX MED GY IP 250 OP 250 PS 637: Performed by: ANESTHESIOLOGY

## 2023-10-26 PROCEDURE — 47562 LAPAROSCOPIC CHOLECYSTECTOMY: CPT | Mod: AS | Performed by: PHYSICIAN ASSISTANT

## 2023-10-26 PROCEDURE — 47562 LAPAROSCOPIC CHOLECYSTECTOMY: CPT | Performed by: SURGERY

## 2023-10-26 PROCEDURE — 258N000003 HC RX IP 258 OP 636: Performed by: ANESTHESIOLOGY

## 2023-10-26 PROCEDURE — 250N000011 HC RX IP 250 OP 636: Performed by: NURSE ANESTHETIST, CERTIFIED REGISTERED

## 2023-10-26 PROCEDURE — 272N000001 HC OR GENERAL SUPPLY STERILE: Performed by: SURGERY

## 2023-10-26 PROCEDURE — 258N000001 HC RX 258: Performed by: SURGERY

## 2023-10-26 PROCEDURE — 710N000009 HC RECOVERY PHASE 1, LEVEL 1, PER MIN: Performed by: SURGERY

## 2023-10-26 PROCEDURE — 250N000011 HC RX IP 250 OP 636: Performed by: SURGERY

## 2023-10-26 PROCEDURE — 370N000017 HC ANESTHESIA TECHNICAL FEE, PER MIN: Performed by: SURGERY

## 2023-10-26 PROCEDURE — 250N000011 HC RX IP 250 OP 636: Mod: JZ | Performed by: NURSE ANESTHETIST, CERTIFIED REGISTERED

## 2023-10-26 PROCEDURE — 710N000012 HC RECOVERY PHASE 2, PER MINUTE: Performed by: SURGERY

## 2023-10-26 PROCEDURE — 250N000025 HC SEVOFLURANE, PER MIN: Performed by: SURGERY

## 2023-10-26 PROCEDURE — 88304 TISSUE EXAM BY PATHOLOGIST: CPT | Mod: 26 | Performed by: PATHOLOGY

## 2023-10-26 PROCEDURE — 360N000076 HC SURGERY LEVEL 3, PER MIN: Performed by: SURGERY

## 2023-10-26 PROCEDURE — 88304 TISSUE EXAM BY PATHOLOGIST: CPT | Mod: TC | Performed by: SURGERY

## 2023-10-26 PROCEDURE — 258N000003 HC RX IP 258 OP 636: Performed by: NURSE ANESTHETIST, CERTIFIED REGISTERED

## 2023-10-26 PROCEDURE — 250N000009 HC RX 250: Performed by: SURGERY

## 2023-10-26 PROCEDURE — 250N000009 HC RX 250: Performed by: NURSE ANESTHETIST, CERTIFIED REGISTERED

## 2023-10-26 PROCEDURE — 999N000141 HC STATISTIC PRE-PROCEDURE NURSING ASSESSMENT: Performed by: SURGERY

## 2023-10-26 RX ORDER — DIMENHYDRINATE 50 MG/ML
25 INJECTION, SOLUTION INTRAMUSCULAR; INTRAVENOUS
Status: DISCONTINUED | OUTPATIENT
Start: 2023-10-26 | End: 2023-10-26 | Stop reason: HOSPADM

## 2023-10-26 RX ORDER — SODIUM CHLORIDE, SODIUM LACTATE, POTASSIUM CHLORIDE, CALCIUM CHLORIDE 600; 310; 30; 20 MG/100ML; MG/100ML; MG/100ML; MG/100ML
INJECTION, SOLUTION INTRAVENOUS CONTINUOUS PRN
Status: DISCONTINUED | OUTPATIENT
Start: 2023-10-26 | End: 2023-10-26

## 2023-10-26 RX ORDER — SODIUM CHLORIDE, SODIUM LACTATE, POTASSIUM CHLORIDE, CALCIUM CHLORIDE 600; 310; 30; 20 MG/100ML; MG/100ML; MG/100ML; MG/100ML
INJECTION, SOLUTION INTRAVENOUS CONTINUOUS
Status: DISCONTINUED | OUTPATIENT
Start: 2023-10-26 | End: 2023-10-26 | Stop reason: HOSPADM

## 2023-10-26 RX ORDER — DIPHENHYDRAMINE HCL 25 MG
25 CAPSULE ORAL EVERY 6 HOURS PRN
Status: DISCONTINUED | OUTPATIENT
Start: 2023-10-26 | End: 2023-10-26 | Stop reason: HOSPADM

## 2023-10-26 RX ORDER — ONDANSETRON 4 MG/1
4 TABLET, ORALLY DISINTEGRATING ORAL EVERY 30 MIN PRN
Status: DISCONTINUED | OUTPATIENT
Start: 2023-10-26 | End: 2023-10-26 | Stop reason: HOSPADM

## 2023-10-26 RX ORDER — HYDROMORPHONE HCL IN WATER/PF 6 MG/30 ML
0.2 PATIENT CONTROLLED ANALGESIA SYRINGE INTRAVENOUS EVERY 5 MIN PRN
Status: DISCONTINUED | OUTPATIENT
Start: 2023-10-26 | End: 2023-10-26 | Stop reason: HOSPADM

## 2023-10-26 RX ORDER — DEXAMETHASONE SODIUM PHOSPHATE 4 MG/ML
INJECTION, SOLUTION INTRA-ARTICULAR; INTRALESIONAL; INTRAMUSCULAR; INTRAVENOUS; SOFT TISSUE PRN
Status: DISCONTINUED | OUTPATIENT
Start: 2023-10-26 | End: 2023-10-26

## 2023-10-26 RX ORDER — PROPOFOL 10 MG/ML
INJECTION, EMULSION INTRAVENOUS PRN
Status: DISCONTINUED | OUTPATIENT
Start: 2023-10-26 | End: 2023-10-26

## 2023-10-26 RX ORDER — DIAZEPAM 10 MG/2ML
2.5 INJECTION, SOLUTION INTRAMUSCULAR; INTRAVENOUS
Status: DISCONTINUED | OUTPATIENT
Start: 2023-10-26 | End: 2023-10-26 | Stop reason: HOSPADM

## 2023-10-26 RX ORDER — LIDOCAINE 40 MG/G
CREAM TOPICAL
Status: DISCONTINUED | OUTPATIENT
Start: 2023-10-26 | End: 2023-10-26 | Stop reason: HOSPADM

## 2023-10-26 RX ORDER — OXYCODONE HYDROCHLORIDE 5 MG/1
5 TABLET ORAL
Status: DISCONTINUED | OUTPATIENT
Start: 2023-10-26 | End: 2023-10-26 | Stop reason: HOSPADM

## 2023-10-26 RX ORDER — INDOCYANINE GREEN AND WATER 25 MG
2.5 KIT INJECTION ONCE
Status: COMPLETED | OUTPATIENT
Start: 2023-10-26 | End: 2023-10-26

## 2023-10-26 RX ORDER — LIDOCAINE HYDROCHLORIDE 20 MG/ML
INJECTION, SOLUTION INFILTRATION; PERINEURAL PRN
Status: DISCONTINUED | OUTPATIENT
Start: 2023-10-26 | End: 2023-10-26

## 2023-10-26 RX ORDER — HALOPERIDOL 5 MG/ML
1 INJECTION INTRAMUSCULAR
Status: DISCONTINUED | OUTPATIENT
Start: 2023-10-26 | End: 2023-10-26 | Stop reason: HOSPADM

## 2023-10-26 RX ORDER — PROPOFOL 10 MG/ML
INJECTION, EMULSION INTRAVENOUS CONTINUOUS PRN
Status: DISCONTINUED | OUTPATIENT
Start: 2023-10-26 | End: 2023-10-26

## 2023-10-26 RX ORDER — BUPIVACAINE HYDROCHLORIDE 5 MG/ML
INJECTION, SOLUTION PERINEURAL PRN
Status: DISCONTINUED | OUTPATIENT
Start: 2023-10-26 | End: 2023-10-26 | Stop reason: HOSPADM

## 2023-10-26 RX ORDER — ONDANSETRON 2 MG/ML
4 INJECTION INTRAMUSCULAR; INTRAVENOUS EVERY 30 MIN PRN
Status: DISCONTINUED | OUTPATIENT
Start: 2023-10-26 | End: 2023-10-26 | Stop reason: HOSPADM

## 2023-10-26 RX ORDER — HYDRALAZINE HYDROCHLORIDE 20 MG/ML
2.5-5 INJECTION INTRAMUSCULAR; INTRAVENOUS EVERY 10 MIN PRN
Status: DISCONTINUED | OUTPATIENT
Start: 2023-10-26 | End: 2023-10-26 | Stop reason: HOSPADM

## 2023-10-26 RX ORDER — HYDROMORPHONE HCL IN WATER/PF 6 MG/30 ML
0.4 PATIENT CONTROLLED ANALGESIA SYRINGE INTRAVENOUS EVERY 5 MIN PRN
Status: DISCONTINUED | OUTPATIENT
Start: 2023-10-26 | End: 2023-10-26 | Stop reason: HOSPADM

## 2023-10-26 RX ORDER — ALBUTEROL SULFATE 0.83 MG/ML
2.5 SOLUTION RESPIRATORY (INHALATION) EVERY 4 HOURS PRN
Status: DISCONTINUED | OUTPATIENT
Start: 2023-10-26 | End: 2023-10-26 | Stop reason: HOSPADM

## 2023-10-26 RX ORDER — FENTANYL CITRATE 50 UG/ML
50 INJECTION, SOLUTION INTRAMUSCULAR; INTRAVENOUS EVERY 5 MIN PRN
Status: DISCONTINUED | OUTPATIENT
Start: 2023-10-26 | End: 2023-10-26 | Stop reason: HOSPADM

## 2023-10-26 RX ORDER — DIPHENHYDRAMINE HYDROCHLORIDE 50 MG/ML
25 INJECTION INTRAMUSCULAR; INTRAVENOUS EVERY 6 HOURS PRN
Status: DISCONTINUED | OUTPATIENT
Start: 2023-10-26 | End: 2023-10-26 | Stop reason: HOSPADM

## 2023-10-26 RX ORDER — KETOROLAC TROMETHAMINE 30 MG/ML
INJECTION, SOLUTION INTRAMUSCULAR; INTRAVENOUS PRN
Status: DISCONTINUED | OUTPATIENT
Start: 2023-10-26 | End: 2023-10-26

## 2023-10-26 RX ORDER — OXYCODONE HYDROCHLORIDE 5 MG/1
5 TABLET ORAL
Status: DISCONTINUED | OUTPATIENT
Start: 2023-10-26 | End: 2023-10-26

## 2023-10-26 RX ORDER — CEFAZOLIN SODIUM/WATER 2 G/20 ML
2 SYRINGE (ML) INTRAVENOUS
Status: DISCONTINUED | OUTPATIENT
Start: 2023-10-26 | End: 2023-10-26 | Stop reason: HOSPADM

## 2023-10-26 RX ORDER — ONDANSETRON 2 MG/ML
INJECTION INTRAMUSCULAR; INTRAVENOUS PRN
Status: DISCONTINUED | OUTPATIENT
Start: 2023-10-26 | End: 2023-10-26

## 2023-10-26 RX ORDER — OXYCODONE HYDROCHLORIDE 5 MG/1
5-10 TABLET ORAL EVERY 4 HOURS PRN
Qty: 20 TABLET | Refills: 0 | Status: SHIPPED | OUTPATIENT
Start: 2023-10-26

## 2023-10-26 RX ORDER — LABETALOL HYDROCHLORIDE 5 MG/ML
10 INJECTION, SOLUTION INTRAVENOUS
Status: DISCONTINUED | OUTPATIENT
Start: 2023-10-26 | End: 2023-10-26 | Stop reason: HOSPADM

## 2023-10-26 RX ORDER — ACETAMINOPHEN 325 MG/1
975 TABLET ORAL ONCE
Status: COMPLETED | OUTPATIENT
Start: 2023-10-26 | End: 2023-10-26

## 2023-10-26 RX ORDER — OXYCODONE HYDROCHLORIDE 5 MG/1
10 TABLET ORAL
Status: DISCONTINUED | OUTPATIENT
Start: 2023-10-26 | End: 2023-10-26 | Stop reason: HOSPADM

## 2023-10-26 RX ORDER — ONDANSETRON 4 MG/1
4 TABLET, ORALLY DISINTEGRATING ORAL EVERY 8 HOURS PRN
Qty: 10 TABLET | Refills: 0 | Status: SHIPPED | OUTPATIENT
Start: 2023-10-26

## 2023-10-26 RX ORDER — CEFAZOLIN SODIUM/WATER 2 G/20 ML
2 SYRINGE (ML) INTRAVENOUS SEE ADMIN INSTRUCTIONS
Status: DISCONTINUED | OUTPATIENT
Start: 2023-10-26 | End: 2023-10-26 | Stop reason: HOSPADM

## 2023-10-26 RX ORDER — GLYCOPYRROLATE 0.2 MG/ML
INJECTION, SOLUTION INTRAMUSCULAR; INTRAVENOUS PRN
Status: DISCONTINUED | OUTPATIENT
Start: 2023-10-26 | End: 2023-10-26

## 2023-10-26 RX ORDER — FENTANYL CITRATE 50 UG/ML
INJECTION, SOLUTION INTRAMUSCULAR; INTRAVENOUS PRN
Status: DISCONTINUED | OUTPATIENT
Start: 2023-10-26 | End: 2023-10-26

## 2023-10-26 RX ORDER — FENTANYL CITRATE 50 UG/ML
25 INJECTION, SOLUTION INTRAMUSCULAR; INTRAVENOUS EVERY 5 MIN PRN
Status: DISCONTINUED | OUTPATIENT
Start: 2023-10-26 | End: 2023-10-26 | Stop reason: HOSPADM

## 2023-10-26 RX ADMIN — PROPOFOL 200 MG: 10 INJECTION, EMULSION INTRAVENOUS at 12:09

## 2023-10-26 RX ADMIN — SODIUM CHLORIDE, POTASSIUM CHLORIDE, SODIUM LACTATE AND CALCIUM CHLORIDE: 600; 310; 30; 20 INJECTION, SOLUTION INTRAVENOUS at 12:03

## 2023-10-26 RX ADMIN — ONDANSETRON 4 MG: 2 INJECTION INTRAMUSCULAR; INTRAVENOUS at 12:09

## 2023-10-26 RX ADMIN — FENTANYL CITRATE 100 MCG: 50 INJECTION INTRAMUSCULAR; INTRAVENOUS at 12:09

## 2023-10-26 RX ADMIN — MIDAZOLAM 2 MG: 1 INJECTION INTRAMUSCULAR; INTRAVENOUS at 12:02

## 2023-10-26 RX ADMIN — GLYCOPYRROLATE 0.2 MG: 0.2 INJECTION, SOLUTION INTRAMUSCULAR; INTRAVENOUS at 12:09

## 2023-10-26 RX ADMIN — LIDOCAINE HYDROCHLORIDE 50 MG: 20 INJECTION, SOLUTION INFILTRATION; PERINEURAL at 12:09

## 2023-10-26 RX ADMIN — SODIUM CHLORIDE, POTASSIUM CHLORIDE, SODIUM LACTATE AND CALCIUM CHLORIDE: 600; 310; 30; 20 INJECTION, SOLUTION INTRAVENOUS at 11:04

## 2023-10-26 RX ADMIN — Medication 2 G: at 12:02

## 2023-10-26 RX ADMIN — PROPOFOL 75 MCG/KG/MIN: 10 INJECTION, EMULSION INTRAVENOUS at 12:09

## 2023-10-26 RX ADMIN — SUGAMMADEX 200 MG: 100 INJECTION, SOLUTION INTRAVENOUS at 13:29

## 2023-10-26 RX ADMIN — ACETAMINOPHEN 975 MG: 325 TABLET, FILM COATED ORAL at 15:32

## 2023-10-26 RX ADMIN — KETOROLAC TROMETHAMINE 30 MG: 30 INJECTION, SOLUTION INTRAMUSCULAR at 12:09

## 2023-10-26 RX ADMIN — ROCURONIUM BROMIDE 20 MG: 50 INJECTION, SOLUTION INTRAVENOUS at 12:54

## 2023-10-26 RX ADMIN — HYDROMORPHONE HYDROCHLORIDE 1 MG: 1 INJECTION, SOLUTION INTRAMUSCULAR; INTRAVENOUS; SUBCUTANEOUS at 12:09

## 2023-10-26 RX ADMIN — ROCURONIUM BROMIDE 50 MG: 50 INJECTION, SOLUTION INTRAVENOUS at 12:09

## 2023-10-26 RX ADMIN — PHENYLEPHRINE HYDROCHLORIDE 200 MCG: 10 INJECTION INTRAVENOUS at 12:22

## 2023-10-26 RX ADMIN — PHENYLEPHRINE HYDROCHLORIDE 100 MCG: 10 INJECTION INTRAVENOUS at 13:10

## 2023-10-26 RX ADMIN — INDOCYANINE GREEN AND WATER 2.5 MG: KIT at 11:06

## 2023-10-26 RX ADMIN — DEXAMETHASONE SODIUM PHOSPHATE 8 MG: 4 INJECTION, SOLUTION INTRA-ARTICULAR; INTRALESIONAL; INTRAMUSCULAR; INTRAVENOUS; SOFT TISSUE at 12:09

## 2023-10-26 RX ADMIN — SUGAMMADEX 200 MG: 100 INJECTION, SOLUTION INTRAVENOUS at 13:22

## 2023-10-26 ASSESSMENT — ACTIVITIES OF DAILY LIVING (ADL)
ADLS_ACUITY_SCORE: 36

## 2023-10-26 NOTE — ANESTHESIA CARE TRANSFER NOTE
Patient: Selena Saenz    Procedure: Procedure(s):  CHOLECYSTECTOMY, LAPAROSCOPIC       Diagnosis: Gallstones [K80.20]  Diagnosis Additional Information: No value filed.    Anesthesia Type:   General     Note:    Oropharynx: oral airway in place  Level of Consciousness: drowsy  Oxygen Supplementation: face mask    Independent Airway: airway patency satisfactory and stable  Dentition: dentition unchanged  Vital Signs Stable: post-procedure vital signs reviewed and stable  Report to RN Given: handoff report given  Patient transferred to: PACU    Handoff Report: Identifed the Patient, Identified the Reponsible Provider, Reviewed the pertinent medical history, Discussed the surgical course, Reviewed Intra-OP anesthesia mangement and issues during anesthesia, Set expectations for post-procedure period and Allowed opportunity for questions and acknowledgement of understanding      Vitals:  Vitals Value Taken Time   /69 10/26/23 1335   Temp     Pulse 70 10/26/23 1337   Resp 12 10/26/23 1337   SpO2 99 % 10/26/23 1337   Vitals shown include unfiled device data.    Electronically Signed By: JA Oneil CRNA  October 26, 2023  1:38 PM

## 2023-10-26 NOTE — DISCHARGE INSTRUCTIONS
Dr. Griffith   [facility] [phone]     You had an IV form of Ibuprofen at 12N. Do not take any oray Ibuprofen/Motrin until 6 PM.   Maximum oral Ibuprofen/Motrin in 24 hours = 3,000 mg.    Maximum Acetaminophen/Tylenol in 24 hours = 4,000 mg.    HOME CARE FOLLOWING LAPAROSCOPIC CHOLECYSTECTOMY  Kaylan Griffith, TWIN Waller, MEENA Giordano, CLEVE Davey, ADAN Kat    INCISIONAL CARE:  Replace the bandage over your incisions DAILY until all drainage stops, or if more comfortable to have in place.  If present, leave the steri-strips (white paper tapes) in place for 14 days after surgery.  If Dermabond (a type of skin glue) is present, leave in place until it wears/flakes off (2-3 weeks).     BATHING:  OK to shower 48 hours after surgery.  Avoid baths for 1 week after surgery.  You may wash your hair at any time.  Gently pat your incision dry after bathing.  Do not apply lotions, creams, or ointments to incisions.    ACTIVITY:  Light Activity -- you may immediately be up and about as tolerated.  Walking is encouraged, increase as tolerated.  Driving/Light Work-- when comfortable and off narcotic pain medications.  Strenuous Work/Activity -- limit lifting to 20 pounds for 2 weeks.  Progressively increase with time.  Active Sports (running, biking, etc.) -- cautiously resume after 2 weeks.    DISCOMFORT:  Local anesthetic placed at surgery should provide relief for 4-8 hours.  Begin taking pain pills before discomfort is severe.  Take the pain medication with some food, when possible, to minimize side effects.  Intermittent use of ice packs may help during the first 1-3 weeks after surgery.  Expect gradual improvement.    Over-the-counter anti-inflammatory medications (i.e. Ibuprofen/Advil/Motrin or Naprosyn/Aleve) may be used per package instructions in addition to or while tapering off the narcotic pain medications to decrease swelling and sensitivity.  DO NOT TAKE these Anti-inflammatory medications if  your primary physician has advised against doing so, or if you have acid reflux, ulcer, or bleeding disorder, or take blood-thinner medications.  Call your primary physician or the surgery office if you have medication questions.    After laparoscopic cholecystectomy, you may have shoulder or upper back discomfort due to the gas used during surgery.  This is temporary and should resolve within 2-3 days.  Frequent short walks may help with this.  You may have decreased energy level for 1-2 weeks after surgery related to your recovery.    DIET:  Start with liquids and gradually increase diet as tolerated.  Drink plenty of fluids.  While taking pain medications, consider use of a stool softener, increase your fiber in your diet, or add a fiber supplement (like Metamucil, Citrucel) to help prevent constipation - a possible side effect of pain medications.  It is not uncommon to experience some bowel changes (loose stools or constipation) after surgery.  Your body has to adapt to you no longer having a gall bladder.  To help minimize this side effect, avoid fatty foods for 1-2 weeks after surgery.  You may then slowly increase the amount of fatty foods in your diet.      NAUSEA:  If nauseated from the anesthetic/pain meds; rest in bed, get up cautiously with assistance, and drink clear liquids (juice, tea, broth).    FOLLOW-UP AFTER SURGERY:  -Our office will contact you approximately 2-3 weeks after surgery to check on your progress and answer any questions you may have.  If you are doing well, you will not need to return for an office appointment.  If any concerns are identified over the phone, we will help you make an appointment to see a provider.    -If you have not received a phone call, have any questions or concerns, or would like to be seen, please call us at [phone].  We are located at: [address] [facility], [address]; [place] [address]    -CONTACT US IF THE FOLLOWING DEVELOPS:   1. A fever that is above  101     2. Increased redness, warmth, drainage, bleeding, or swelling.   3. Pain that is not relieved by rest/ice and your prescription.   4.  Increasing pain after 48 hours.   5. Drainage that is thick, cloudy, yellow, green or white.   6. Any other questions or concerns.      FREQUENTLY ASKED QUESTIONS:    Q:  How should my incision look?    A:  Normally your incision will appear slightly swollen with light redness directly along the incision itself as it heals.  It may feel like a bump or ridge as the healing/scarring happens, and over time (3-4 months) this bump or ridge feeling should slowly go away.  In general, clear or pink watery drainage can be normal at first as your incision heals, but should decrease over time.    Q:  How do I know if my incision is infected?  A:  Look at your incision for signs of infection, like redness around the incision spreading to surrounding skin, or drainage of cloudy or foul-smelling drainage.  If you feel warm, check your temperature to see if you are running a fever.    **If any of these things occur, please notify the nurse at our office.  We may need you to come into the office for an incision check.      Q:  How do I take care of my incision?  A:  If you have a dressing in place - Starting the day after surgery, replace the dressing 1-2 times a day until there is no further drainage from the incision.  At that time, a dressing is no longer needed.  Try to minimize tape on the skin if irritation is occurring at the tape sites.  If you have significant irritation from tape on the skin, please call the office to discuss other method of dressing your incision.    Small pieces of tape called  steri-strips  may be present directly overlying your incision; these may be removed 10 days after surgery unless otherwise specified by your surgeon.  If these tapes start to loosen at the ends, you may trim them back until they fall off or are removed.    A:  If you had  Dermabond   tissue glue used as a dressing (this causes your incision to look shiny with a clear covering over it) - This type of dressing wears off with time and does not require more dressings over the top unless it is draining around the glue as it wears off.  Do not apply ointments or lotions over the incisions until the glue has completely worn off.    Q:  There is a piece of tape or a sticky  lead  still on my skin.  Can I remove this?  A:  Sometimes the sticky  leads  used for monitoring during surgery or for evaluation in the emergency department are not all removed while you are in the hospital.  These sometimes have a tab or metal dot on them.  You can easily remove these on your own, like taking off a band-aid.  If there is a gel substance under the  lead , simply wipe/clean it off with a washcloth or paper towel.      Q:  What can I do to minimize constipation (very hard stools, or lack of stools)?  A:  Stay well hydrated.  Increase your dietary fiber intake or take a fiber supplement -with plenty of water.  Walk around frequently.  You may consider an over-the-counter stool-softener.  Your Pharmacist can assist you with choosing one that is stocked at your pharmacy.  Constipation is also one of the most common side effects of pain medication.  If you are using pain medication, be pro-active and try to PREVENT problems with constipation by taking the steps above BEFORE constipation becomes a problem.    Q:  What do I do if I need more pain medications?  A:  Call the office to receive refills.  Be aware that certain pain meds cannot be called into a pharmacy and actually require a paper prescription.  A change may be made in your pain med as you progress thru your recovery period or if you have side effects to certain meds.    --Pain meds are NOT refilled after 5pm on weekdays, and NOT AT ALL on the weekends, so please look ahead to prevent problems.      Q:  Why am I having a hard time sleeping now that I am at  home?  A:  Many medications you receive while you are in the hospital can impact your sleep for a number of days after your surgery/hospitalization.  Decreased level of activity and naps during the day may also make sleeping at night difficult.  Try to minimize day-time naps, and get up frequently during the day to walk around your home during your recovery time.  Sleep aides may be of some help, but are not recommended for long-term use.      Q:  I am having some back discomfort.  What should I do?  A:  This may be related to certain positioning that was required for your surgery, extended periods of time in bed, or other changes in your overall activity level.  You may try ice, heat, acetaminophen, or ibuprofen to treat this temporarily.  Note that many pain medications have acetaminophen in them and would state this on the prescription bottle.  Be sure not to exceed the maximum of 4000mg per day of acetaminophen.     **If the pain you are having does not resolve, is severe, or is a flare of back pain you have had on other occasions prior to surgery, please contact your primary physician for further recommendations or for an appointment to be examined at their office.    Q:  Why am I having headaches?  A:  Headaches can be caused by many things:  caffeine withdrawal, use of pain meds, dehydration, high blood pressure, lack of sleep, over-activity/exhaustion, flare-up of usual migraine headaches.  If you feel this is related to muscle tension (a band-like feeling around the head, or a pressure at the low-back of the head) you may try ice or heat to this area.  You may need to drink more fluids (try electrolyte drink like Gatorade), rest, or take your usual migraine medications.   **If your headaches do not resolve, worsen, are accompanied by other symptoms, or if your blood pressure is high, please call your primary physician for recommendation and/or examination.    Q:  I am unable to urinate.  What do I do?  A:   A small percentage of people can have difficulty urinating initially after surgery.  This includes being able to urinate only a very small amount at a time and feeling discomfort or pressure in the very low abdomen.  This is called  urinary retention , and is actually an urgent situation.  Proceed to your nearest Emergency department for evaluation (not an Urgent Care Center).  Sometimes the bladder does not work correctly after certain medications you receive during surgery, or related to certain procedures.  You may need to have a catheter placed until your bladder recovers.  When planning to go to an Emergency department, it may help to call the ER to let them know you are coming in for this problem after a surgery.  This may help you get in quicker to be evaluated.  **If you have symptoms of a urinary tract infection, please contact your primary physician for the proper evaluation and treatment.          If you have other questions, please call the office Monday thru Friday between 8am and 4:30pm to discuss with the nurse or physician assistant.  [phone]    There is a surgeon ON CALL on weekday evenings and over the weekend in case of urgent need only, and may be contacted at the same number.    If you are having an emergency, call 911 or proceed to your nearest emergency department.  GENERAL ANESTHESIA OR SEDATION ADULT DISCHARGE INSTRUCTIONS   SPECIAL PRECAUTIONS FOR 24 HOURS AFTER SURGERY    IT IS NOT UNUSUAL TO FEEL LIGHT-HEADED OR FAINT, UP TO 24 HOURS AFTER SURGERY OR WHILE TAKING PAIN MEDICATION.  IF YOU HAVE THESE SYMPTOMS; SIT FOR A FEW MINUTES BEFORE STANDING AND HAVE SOMEONE ASSIST YOU WHEN YOU GET UP TO WALK OR USE THE BATHROOM.    YOU SHOULD REST AND RELAX FOR THE NEXT 24 HOURS AND YOU MUST MAKE ARRANGEMENTS TO HAVE SOMEONE STAY WITH YOU FOR AT LEAST 24 HOURS AFTER YOUR DISCHARGE.  AVOID HAZARDOUS AND STRENUOUS ACTIVITIES.  DO NOT MAKE IMPORTANT DECISIONS FOR 24 HOURS.    DO NOT DRIVE ANY  VEHICLE OR OPERATE MECHANICAL EQUIPMENT FOR 24 HOURS FOLLOWING THE END OF YOUR SURGERY.  EVEN THOUGH YOU MAY FEEL NORMAL, YOUR REACTIONS MAY BE AFFECTED BY THE MEDICATION YOU HAVE RECEIVED.    DO NOT DRINK ALCOHOLIC BEVERAGES FOR 24 HOURS FOLLOWING YOUR SURGERY.    DRINK CLEAR LIQUIDS (APPLE JUICE, GINGER ALE, 7-UP, BROTH, ETC.).  PROGRESS TO YOUR REGULAR DIET AS YOU FEEL ABLE.    YOU MAY HAVE A DRY MOUTH, A SORE THROAT, MUSCLES ACHES OR TROUBLE SLEEPING.  THESE SHOULD GO AWAY AFTER 24 HOURS.    CALL YOUR DOCTOR FOR ANY OF THE FOLLOWING:  SIGNS OF INFECTION (FEVER, GROWING TENDERNESS AT THE SURGERY SITE, A LARGE AMOUNT OF DRAINAGE OR BLEEDING, SEVERE PAIN, FOUL-SMELLING DRAINAGE, REDNESS OR SWELLING.    IT HAS BEEN OVER 8 TO 10 HOURS SINCE SURGERY AND YOU ARE STILL NOT ABLE TO URINATE (PASS WATER).

## 2023-10-26 NOTE — OP NOTE
General Surgery Operative Note    Pre-operative diagnosis:  Gallstones [K80.20]   Post-operative diagnosis: Same   Procedure: Laparoscopic Cholecystectomy -increased difficulty   Surgeon: Ovi Griffith MD   Assistant(s): Radha Joseph PA-C - the physician assistant was medically necessary to assist in prepping, positioning, camera operation, retraction/exposure and closure of the port site.    Anesthesia: General    Estimated blood loss: 5 cc's   Drains placed: None   Complications:  None   Findings:  Gallbladder with significantly aberrant anatomy.  The gallbladder was predominantly intrahepatic on the left side of the abdomen, though the neck of the gallbladder was at midline.  There was an accessory duct across the dome of the gallbladder and the right hepatic duct was visible within the gallbladder fossa.     INDICATIONS FOR OPERATION: This is a patient with upper abdominal pain and gallstones.  Laparoscopic cholecystectomy was recommended.  The procedure along with its risks and complications was discussed in detail and the patient agreed to proceed.    DETAILS OF THE OPERATION: After informed consent the patient was taken to the operating room where she underwent satisfactory induction of general anesthesia.  The patient was then sterilely prepped and draped.  A supraumbilical skin incision was made using a skin knife.  The dissection was carried bluntly down to the fascia.  The fascia was opened using electrocautery and the Navas trocar was then inserted.  Pneumoperitoneum was achieved using CO2 insufflation, and under direct visualization, a 5 mm subxiphoid port was placed.  The gallbladder was seen and was obviously aberrant, with the intrahepatic dome of the gallbladder on the left side of the abdomen.  We therefore placed an additional 5 mm port in the left upper quadrant, and one in the right abdomen.  We then gradually began dissecting around the neck of the gallbladder.  ICG fluorescence  imaging was used to help visualize structures.  The cystic duct was identified, and just behind it, the right hepatic could be seen coursing up into the liver.  After very careful dissection, given the aberrant anatomy, the cystic duct was fully dissected free and was then triple clipped and divided.  We then began dissecting the remainder of the gallbladder neck free.  There was a large artery seen and this was carefully dissected free.  This was very close to the superficial right hepatic duct.  For this reason, we then shifted to the dome of the gallbladder to begin a top-down dissection.  At the dome of the gallbladder, there was an accessory bile duct running across the gallbladder.  We carefully dissected under this, leaving the accessory duct intact.  We then began to dissect down along the course of the gallbladder, eventually coming to 2 ducts going clearly into the gallbladder.  These were each triple clipped and divided, thus freeing the gallbladder.  This was then placed in an Endo Catch bag and set aside.  The gallbladder fossa revealed an easily visible right hepatic duct.  This was intact.  Hemostasis was excellent.  We now injected local anesthetic into the trocar sites.  The gallbladder fossa was irrigated out.  There was excellent hemostasis and the clips were in good position.  The trochars were removed under direct visualization.  The gallbladder was removed through the supraumbilical incision.  There was a very large stone within the gallbladder, and we had to break that stone to get it out through the incision.  The supraumbilical fascia was then closed using 0 Vicryl suture.  Skin incisions were closed using 4-0 subcuticular Vicryl followed by Steri-Strips.    The patient was transferred to the recovery room in satisfactory condition.  Sponge and needle counts were correct at the close of the case.      Specimens:   ID Type Source Tests Collected by Time Destination   1 : gallbladder Tissue  Gallbladder SURGICAL PATHOLOGY EXAM Ovi Griffith MD 10/26/2023  1:07 PM            Ovi Griffith MD

## 2023-10-26 NOTE — ANESTHESIA PROCEDURE NOTES
Airway       Patient location during procedure: OR       Procedure Start/Stop Times: 10/26/2023 12:10 PM  Staff -        Performed By: CRNA  Consent for Airway        Urgency: elective  Indications and Patient Condition       Indications for airway management: mari-procedural       Induction type:intravenous      Final Airway Details       Final airway type: endotracheal airway       Successful airway: ETT - single  Endotracheal Airway Details        ETT size (mm): 7.0       Cuffed: yes       Successful intubation technique: video laryngoscopy       VL Blade Size: Glidescope 3       Grade View of Cords: 1       Adjucts: stylet       Position: Right       Measured from: gums/teeth       Bite block used: Oral Airway    Post intubation assessment        Placement verified by: capnometry and equal breath sounds        Number of attempts at approach: 2       Secured with: plastic tape       Ease of procedure: easy       Dentition: Intact    Medication(s) Administered   Medication Administration Time: 10/26/2023 12:10 PM

## 2023-10-26 NOTE — ANESTHESIA PREPROCEDURE EVALUATION
Anesthesia Pre-Procedure Evaluation    Patient: Selena Saenz   MRN: [id number] : 1960        Procedure : Procedure(s):  CHOLECYSTECTOMY, LAPAROSCOPIC          Past Medical History:   Diagnosis Date    Hyperlipidaemia     Hypertension     Hypothyroidism       Past Surgical History:   Procedure Laterality Date    BIOPSY  2009    Screen for skin cancer. Benign.    COLONOSCOPY  2014    COLONOSCOPY Left 2020    Procedure: COLONOSCOPY;  Surgeon: Wilmer Porter MD;  Location:  GI    EYE SURGERY      [facility]    ORTHOPEDIC SURGERY      Tibia and fibula fracture. Mohsen, plate and screws in left leg      Allergies   Allergen Reactions    Cats     Pollen Extract/Tree Extract       Social History     Tobacco Use    Smoking status: Never     Passive exposure: Never    Smokeless tobacco: Never   Substance Use Topics    Alcohol use: Yes     Comment: 2-3 glasses of wine weekly      Wt Readings from Last 1 Encounters:   10/25/23 89.5 kg (197 lb 6.4 oz)        Anesthesia Evaluation   Pt has had prior anesthetic. Type: General.    No history of anesthetic complications       ROS/MED HX  ENT/Pulmonary:  - neg pulmonary ROS     Neurologic:  - neg neurologic ROS     Cardiovascular:     (+) Dyslipidemia hypertension- -   -  - -                                      METS/Exercise Tolerance:     Hematologic:  - neg hematologic  ROS     Musculoskeletal:   (+)  arthritis,             GI/Hepatic:     (+)          cholecystitis/cholelithiasis,          Renal/Genitourinary:     (+) renal disease, type: CRI, Pt does not require dialysis,           Endo: Comment: Class 1 obesity    (+)          thyroid problem, hypothyroidism,    Obesity,       Psychiatric/Substance Use:  - neg psychiatric ROS     Infectious Disease:  - neg infectious disease ROS     Malignancy:  - neg malignancy ROS     Other:  - neg other ROS          Physical Exam    Airway        Mallampati: II   TM distance: > 3 FB   Neck ROM: full   Mouth opening:  > 3 cm    Respiratory Devices and Support         Dental           Cardiovascular   cardiovascular exam normal       Rhythm and rate: regular and normal     Pulmonary   pulmonary exam normal        breath sounds clear to auscultation       Other findings: Lab Test        09/13/23 08/03/23 07/28/22                       0325          0742          0744          WBC          8.3          6.8          6.8           HGB          13.8         14.0         13.4          MCV          86           87           86            PLT          257          260          258            Lab Test        09/13/23 08/03/23 07/28/22                       0325          0742          0744          NA           136          140          138           POTASSIUM    4.0          4.5          3.9           CHLORIDE     103          104          107           CO2          24           27           22            BUN          16.7         12.9         13            CR           0.77         0.97*        0.83          ANIONGAP     9            9            9             ALIREZA          9.8          9.6          9.1           GLC          114*         98           90                   EKG Interpretation: Sinus bradycardia   Low voltage QRS   Cannot rule out Anterior infarct , age undetermined   Abnormal ECG   When compared with ECG of 07-FEB-2001 20:31,   Minimal criteria for Anterior infarct are now Present   Clinically doubt infarct     OUTSIDE LABS:  CBC:   Lab Results   Component Value Date    WBC 8.3 09/13/2023    WBC 6.8 08/03/2023    HGB 13.8 09/13/2023    HGB 14.0 08/03/2023    HCT 41.6 09/13/2023    HCT 43.2 08/03/2023     09/13/2023     08/03/2023     BMP:   Lab Results   Component Value Date     09/13/2023     08/03/2023    POTASSIUM 4.0 09/13/2023    POTASSIUM 4.5 08/03/2023    CHLORIDE 103 09/13/2023    CHLORIDE 104 08/03/2023    CO2 24 09/13/2023    CO2 27 08/03/2023    BUN 16.7 09/13/2023    BUN  "12.9 08/03/2023    CR 0.77 09/13/2023    CR 0.97 (H) 08/03/2023     (H) 09/13/2023    GLC 98 08/03/2023     COAGS: No results found for: \"PTT\", \"INR\", \"FIBR\"  POC: No results found for: \"BGM\", \"HCG\", \"HCGS\"  HEPATIC:   Lab Results   Component Value Date    ALBUMIN 4.3 09/13/2023    PROTTOTAL 7.5 09/13/2023    ALT 43 09/13/2023    AST 32 09/13/2023    ALKPHOS 118 (H) 09/13/2023    BILITOTAL 0.4 09/13/2023     OTHER:   Lab Results   Component Value Date    ALIREZA 9.8 09/13/2023    LIPASE 49 09/13/2023    TSH 1.45 08/03/2023    CRP 5.5 07/15/2021       Anesthesia Plan    ASA Status:  2    NPO Status:  NPO Appropriate    Anesthesia Type: General.     - Airway: ETT   Induction: Intravenous.   Maintenance: Balanced.        Consents    Anesthesia Plan(s) and associated risks, benefits, and realistic alternatives discussed. Questions answered and patient/representative(s) expressed understanding.     - Discussed: Risks, Benefits and Alternatives for BOTH SEDATION and the PROCEDURE were discussed     - Discussed with:  Patient      - Extended Intubation/Ventilatory Support Discussed: No.      - Patient is DNR/DNI Status: No     Use of blood products discussed: No .     Postoperative Care    Pain management: IV analgesics, Oral pain medications, Multi-modal analgesia.   PONV prophylaxis: Ondansetron (or other 5HT-3), Dexamethasone or Solumedrol, Background Propofol Infusion     Comments:                Jefferson Lockett MD  " WDL

## 2023-10-26 NOTE — ANESTHESIA POSTPROCEDURE EVALUATION
Patient: Selena Saenz    Procedure: Procedure(s):  CHOLECYSTECTOMY, LAPAROSCOPIC       Anesthesia Type:  General    Note:  Disposition: Outpatient   Postop Pain Control: Uneventful            Sign Out: Well controlled pain   PONV: No   Neuro/Psych: Uneventful            Sign Out: Acceptable/Baseline neuro status   Airway/Respiratory: Uneventful            Sign Out: Acceptable/Baseline resp. status   CV/Hemodynamics: Uneventful            Sign Out: Acceptable CV status; No obvious hypovolemia; No obvious fluid overload   Other NRE: NONE   DID A NON-ROUTINE EVENT OCCUR? No           Last vitals:  Vitals Value Taken Time   /69 10/26/23 1335   Temp     Pulse 87 10/26/23 1358   Resp 16 10/26/23 1358   SpO2 100 % 10/26/23 1358   Vitals shown include unfiled device data.    Electronically Signed By: Jefferson Lockett MD  October 26, 2023  1:59 PM

## 2023-10-27 LAB
PATH REPORT.COMMENTS IMP SPEC: NORMAL
PATH REPORT.COMMENTS IMP SPEC: NORMAL
PATH REPORT.FINAL DX SPEC: NORMAL
PATH REPORT.GROSS SPEC: NORMAL
PATH REPORT.MICROSCOPIC SPEC OTHER STN: NORMAL
PATH REPORT.RELEVANT HX SPEC: NORMAL
PHOTO IMAGE: NORMAL

## 2023-11-01 ENCOUNTER — DOCUMENTATION ONLY (OUTPATIENT)
Dept: OTHER | Facility: CLINIC | Age: 63
End: 2023-11-01
Payer: COMMERCIAL

## 2023-11-24 ENCOUNTER — TELEPHONE (OUTPATIENT)
Dept: SURGERY | Facility: CLINIC | Age: 63
End: 2023-11-24
Payer: COMMERCIAL

## 2023-11-24 DIAGNOSIS — Z98.890 POSTOPERATIVE STATE: Primary | ICD-10-CM

## 2023-11-24 PROCEDURE — 99024 POSTOP FOLLOW-UP VISIT: CPT | Performed by: PHYSICIAN ASSISTANT

## 2023-11-24 NOTE — TELEPHONE ENCOUNTER
[facility]   Postoperative Follow-up Phone Call  -Call to patient to review recent procedure and recovery    Attempted to call patient for post op check.  No answer.  Message was left for patient to call back if they had any questions or concerns.  [employer]ist[employer] message sent.    Radha Joseph PA-C       Verist[employer] message:  Jessie Walters,     I left a message on your ph# to call if you have questions/concerns after your gall bladder surgery.    I also wanted to pass on to you that your pathology report confirmed:  mild inflammation of your gall bladder and that there were gallstones present.      You may now remove skin glue or tapes from incision sites.    You may slowly increase activity as tolerated.  Resume workout/strenuous activity at 1 month after surgery.    Please contact me if other questions.    Thank you!  Radha Joseph PA-C

## 2023-12-02 ENCOUNTER — MYC REFILL (OUTPATIENT)
Dept: FAMILY MEDICINE | Facility: CLINIC | Age: 63
End: 2023-12-02
Payer: COMMERCIAL

## 2023-12-02 DIAGNOSIS — F51.01 PRIMARY INSOMNIA: ICD-10-CM

## 2023-12-02 DIAGNOSIS — F41.9 ANXIETY: ICD-10-CM

## 2023-12-04 RX ORDER — ESCITALOPRAM OXALATE 5 MG/1
5 TABLET ORAL DAILY
Qty: 90 TABLET | Refills: 1 | Status: SHIPPED | OUTPATIENT
Start: 2023-12-04

## 2023-12-04 RX ORDER — ZOLPIDEM TARTRATE 5 MG/1
5 TABLET ORAL
Qty: 30 TABLET | Refills: 1 | Status: SHIPPED | OUTPATIENT
Start: 2023-12-04

## 2024-02-14 DIAGNOSIS — I10 BENIGN ESSENTIAL HYPERTENSION: ICD-10-CM

## 2024-02-14 RX ORDER — VALSARTAN 40 MG/1
40 TABLET ORAL DAILY
Qty: 30 TABLET | Refills: 5 | Status: SHIPPED | OUTPATIENT
Start: 2024-02-14

## 2024-03-15 DIAGNOSIS — E78.5 HYPERLIPIDEMIA LDL GOAL <130: ICD-10-CM

## 2024-03-15 DIAGNOSIS — Z13.220 SCREENING FOR HYPERLIPIDEMIA: ICD-10-CM

## 2024-03-18 RX ORDER — ATORVASTATIN CALCIUM 10 MG/1
TABLET, FILM COATED ORAL
Qty: 15 TABLET | Refills: 5 | Status: SHIPPED | OUTPATIENT
Start: 2024-03-18

## 2024-08-25 ENCOUNTER — HEALTH MAINTENANCE LETTER (OUTPATIENT)
Age: 64
End: 2024-08-25

## 2024-11-03 ENCOUNTER — HEALTH MAINTENANCE LETTER (OUTPATIENT)
Age: 64
End: 2024-11-03

## 2025-07-29 ENCOUNTER — PATIENT OUTREACH (OUTPATIENT)
Dept: CARE COORDINATION | Facility: CLINIC | Age: 65
End: 2025-07-29
Payer: COMMERCIAL

## (undated) DEVICE — ESU PENCIL W/HOLSTER E2350H

## (undated) DEVICE — ESU ELEC BLADE 2.75" COATED/INSULATED E1455

## (undated) DEVICE — BAG CLEAR TRASH 1.3M 39X33" P4040C

## (undated) DEVICE — ESU GROUND PAD ADULT W/CORD E7507

## (undated) DEVICE — SU VICRYL 0 CT-2 CR 8X18" J727D

## (undated) DEVICE — LINEN FULL SHEET 5511

## (undated) DEVICE — SUCTION IRR STRYKERFLOW II W/TIP 250-070-520

## (undated) DEVICE — GLOVE BIOGEL PI MICRO SZ 8.0 48580

## (undated) DEVICE — SU VICRYL 4-0 P-3 18" UND J494G

## (undated) DEVICE — LINEN POUCH DBL 5427

## (undated) DEVICE — ENDO POUCH UNIV RETRIEVAL SYSTEM INZII 10MM CD001

## (undated) DEVICE — Device

## (undated) DEVICE — SUCTION CANISTER MEDIVAC LINER 3000ML W/LID 65651-530

## (undated) DEVICE — ENDO TROCAR OPTICAL ACCESS KII Z-THRD 12X100MM C0R85

## (undated) DEVICE — LINEN TOWEL PACK X5 5464

## (undated) DEVICE — SOL NACL 0.9% IRRIG 3000ML BAG 2B7477

## (undated) DEVICE — ESU CORD MONOPOLAR 10'  E0510

## (undated) DEVICE — GLOVE BIOGEL PI MICRO SZ 7.5 48575

## (undated) DEVICE — KIT ENDO TURNOVER/PROCEDURE W/CLEAN A SCOPE LINERS 103888

## (undated) DEVICE — ENDO TROCAR FIRST ENTRY KII FIOS Z-THRD 05X100MM CTF03

## (undated) DEVICE — CLIP APPLIER ENDO 5MM M/L LIGAMAX EL5ML

## (undated) DEVICE — ENDO TROCAR SLEEVE KII Z-THREADED 05X100MM CTS02

## (undated) DEVICE — LINEN HALF SHEET 5512

## (undated) DEVICE — SOL NACL 0.9% IRRIG 1000ML BOTTLE 2F7124

## (undated) DEVICE — PREP CHLORAPREP 26ML TINTED HI-LITE ORANGE 930815

## (undated) RX ORDER — ACETAMINOPHEN 325 MG/1
TABLET ORAL
Status: DISPENSED
Start: 2023-10-26

## (undated) RX ORDER — ONDANSETRON 2 MG/ML
INJECTION INTRAMUSCULAR; INTRAVENOUS
Status: DISPENSED
Start: 2023-10-26

## (undated) RX ORDER — PROPOFOL 10 MG/ML
INJECTION, EMULSION INTRAVENOUS
Status: DISPENSED
Start: 2023-10-26

## (undated) RX ORDER — INDOCYANINE GREEN AND WATER 25 MG
KIT INJECTION
Status: DISPENSED
Start: 2023-10-26

## (undated) RX ORDER — GLYCOPYRROLATE 0.2 MG/ML
INJECTION INTRAMUSCULAR; INTRAVENOUS
Status: DISPENSED
Start: 2023-10-26

## (undated) RX ORDER — LIDOCAINE HYDROCHLORIDE 10 MG/ML
INJECTION, SOLUTION EPIDURAL; INFILTRATION; INTRACAUDAL; PERINEURAL
Status: DISPENSED
Start: 2023-10-26

## (undated) RX ORDER — FENTANYL CITRATE-0.9 % NACL/PF 10 MCG/ML
PLASTIC BAG, INJECTION (ML) INTRAVENOUS
Status: DISPENSED
Start: 2023-10-26

## (undated) RX ORDER — DEXAMETHASONE SODIUM PHOSPHATE 4 MG/ML
INJECTION, SOLUTION INTRA-ARTICULAR; INTRALESIONAL; INTRAMUSCULAR; INTRAVENOUS; SOFT TISSUE
Status: DISPENSED
Start: 2023-10-26

## (undated) RX ORDER — CEFAZOLIN SODIUM/WATER 2 G/20 ML
SYRINGE (ML) INTRAVENOUS
Status: DISPENSED
Start: 2023-10-26

## (undated) RX ORDER — BUPIVACAINE HYDROCHLORIDE 5 MG/ML
INJECTION, SOLUTION EPIDURAL; INTRACAUDAL
Status: DISPENSED
Start: 2023-10-26

## (undated) RX ORDER — KETOROLAC TROMETHAMINE 30 MG/ML
INJECTION, SOLUTION INTRAMUSCULAR; INTRAVENOUS
Status: DISPENSED
Start: 2023-10-26

## (undated) RX ORDER — FENTANYL CITRATE 50 UG/ML
INJECTION, SOLUTION INTRAMUSCULAR; INTRAVENOUS
Status: DISPENSED
Start: 2020-08-28

## (undated) RX ORDER — FENTANYL CITRATE 50 UG/ML
INJECTION, SOLUTION INTRAMUSCULAR; INTRAVENOUS
Status: DISPENSED
Start: 2023-10-26